# Patient Record
Sex: FEMALE | Race: WHITE | Employment: OTHER | URBAN - METROPOLITAN AREA
[De-identification: names, ages, dates, MRNs, and addresses within clinical notes are randomized per-mention and may not be internally consistent; named-entity substitution may affect disease eponyms.]

---

## 2017-01-06 ENCOUNTER — OFFICE VISIT (OUTPATIENT)
Dept: FAMILY MEDICINE CLINIC | Age: 82
End: 2017-01-06

## 2017-01-06 ENCOUNTER — TELEPHONE (OUTPATIENT)
Dept: FAMILY MEDICINE CLINIC | Age: 82
End: 2017-01-06

## 2017-01-06 VITALS
BODY MASS INDEX: 31.65 KG/M2 | WEIGHT: 190 LBS | HEART RATE: 67 BPM | DIASTOLIC BLOOD PRESSURE: 64 MMHG | OXYGEN SATURATION: 97 % | SYSTOLIC BLOOD PRESSURE: 100 MMHG | HEIGHT: 65 IN

## 2017-01-06 DIAGNOSIS — J30.89 PERENNIAL ALLERGIC RHINITIS, UNSPECIFIED ALLERGIC RHINITIS TRIGGER: ICD-10-CM

## 2017-01-06 DIAGNOSIS — G95.9 CERVICAL MYELOPATHY (HCC): ICD-10-CM

## 2017-01-06 DIAGNOSIS — D64.9 NORMOCYTIC ANEMIA: ICD-10-CM

## 2017-01-06 DIAGNOSIS — M48.02 CERVICAL STENOSIS OF SPINAL CANAL: ICD-10-CM

## 2017-01-06 DIAGNOSIS — E78.5 HYPERLIPIDEMIA, UNSPECIFIED HYPERLIPIDEMIA TYPE: Chronic | ICD-10-CM

## 2017-01-06 DIAGNOSIS — M48.062 LUMBAR STENOSIS WITH NEUROGENIC CLAUDICATION: Primary | ICD-10-CM

## 2017-01-06 DIAGNOSIS — M54.9 CHRONIC BACK PAIN GREATER THAN 3 MONTHS DURATION: ICD-10-CM

## 2017-01-06 DIAGNOSIS — I10 ESSENTIAL HYPERTENSION: Chronic | ICD-10-CM

## 2017-01-06 DIAGNOSIS — I51.7 MILD CONCENTRIC LEFT VENTRICULAR HYPERTROPHY (LVH): ICD-10-CM

## 2017-01-06 DIAGNOSIS — Z79.891 CHRONIC PRESCRIPTION OPIATE USE: ICD-10-CM

## 2017-01-06 DIAGNOSIS — J20.9 ACUTE BRONCHITIS WITH BRONCHOSPASM: ICD-10-CM

## 2017-01-06 DIAGNOSIS — R06.2 WHEEZING: ICD-10-CM

## 2017-01-06 DIAGNOSIS — G89.29 CHRONIC BACK PAIN GREATER THAN 3 MONTHS DURATION: ICD-10-CM

## 2017-01-06 PROCEDURE — 36415 COLL VENOUS BLD VENIPUNCTURE: CPT | Performed by: FAMILY MEDICINE

## 2017-01-06 PROCEDURE — 99203 OFFICE O/P NEW LOW 30 MIN: CPT | Performed by: FAMILY MEDICINE

## 2017-01-06 RX ORDER — OXYCODONE HYDROCHLORIDE AND ACETAMINOPHEN 5; 325 MG/1; MG/1
1 TABLET ORAL EVERY 4 HOURS PRN
COMMUNITY
End: 2018-02-19 | Stop reason: SDUPTHER

## 2017-01-06 RX ORDER — OXYCODONE AND ACETAMINOPHEN 10; 325 MG/1; MG/1
1 TABLET ORAL EVERY 6 HOURS PRN
Qty: 30 TABLET | Refills: 0 | Status: CANCELLED | OUTPATIENT
Start: 2017-01-06

## 2017-01-06 RX ORDER — DEXTROMETHORPHAN HYDROBROMIDE AND PROMETHAZINE HYDROCHLORIDE 15; 6.25 MG/5ML; MG/5ML
5 SYRUP ORAL 4 TIMES DAILY PRN
Qty: 180 ML | Refills: 0 | Status: SHIPPED | OUTPATIENT
Start: 2017-01-06 | End: 2017-01-13

## 2017-01-06 RX ORDER — METHYLPREDNISOLONE 4 MG/1
TABLET ORAL
Qty: 1 KIT | Refills: 0 | Status: SHIPPED | OUTPATIENT
Start: 2017-01-06 | End: 2017-10-05 | Stop reason: ALTCHOICE

## 2017-01-07 LAB
A/G RATIO: 1.6 (ref 1.1–2.2)
ALBUMIN SERPL-MCNC: 4.3 G/DL (ref 3.4–5)
ALP BLD-CCNC: 71 U/L (ref 40–129)
ALT SERPL-CCNC: 26 U/L (ref 10–40)
ANION GAP SERPL CALCULATED.3IONS-SCNC: 11 MMOL/L (ref 3–16)
AST SERPL-CCNC: 24 U/L (ref 15–37)
BASOPHILS ABSOLUTE: 0.1 K/UL (ref 0–0.2)
BASOPHILS RELATIVE PERCENT: 0.8 %
BILIRUB SERPL-MCNC: 0.3 MG/DL (ref 0–1)
BUN BLDV-MCNC: 19 MG/DL (ref 7–20)
CALCIUM SERPL-MCNC: 10.5 MG/DL (ref 8.3–10.6)
CHLORIDE BLD-SCNC: 104 MMOL/L (ref 99–110)
CHOLESTEROL, TOTAL: 148 MG/DL (ref 0–199)
CO2: 32 MMOL/L (ref 21–32)
CREAT SERPL-MCNC: 0.8 MG/DL (ref 0.6–1.2)
EOSINOPHILS ABSOLUTE: 0.2 K/UL (ref 0–0.6)
EOSINOPHILS RELATIVE PERCENT: 2.2 %
FERRITIN: 251.4 NG/ML (ref 15–150)
FOLATE: >20 NG/ML (ref 4.78–24.2)
GFR AFRICAN AMERICAN: >60
GFR NON-AFRICAN AMERICAN: >60
GLOBULIN: 2.7 G/DL
GLUCOSE BLD-MCNC: 125 MG/DL (ref 70–99)
HCT VFR BLD CALC: 35.6 % (ref 36–48)
HDLC SERPL-MCNC: 33 MG/DL (ref 40–60)
HEMOGLOBIN: 12 G/DL (ref 12–16)
IRON SATURATION: 25 % (ref 15–50)
IRON: 75 UG/DL (ref 37–145)
LDL CHOLESTEROL CALCULATED: 66 MG/DL
LYMPHOCYTES ABSOLUTE: 1.8 K/UL (ref 1–5.1)
LYMPHOCYTES RELATIVE PERCENT: 19.4 %
MCH RBC QN AUTO: 30.9 PG (ref 26–34)
MCHC RBC AUTO-ENTMCNC: 33.9 G/DL (ref 31–36)
MCV RBC AUTO: 91.3 FL (ref 80–100)
MONOCYTES ABSOLUTE: 0.4 K/UL (ref 0–1.3)
MONOCYTES RELATIVE PERCENT: 4.7 %
NEUTROPHILS ABSOLUTE: 6.6 K/UL (ref 1.7–7.7)
NEUTROPHILS RELATIVE PERCENT: 72.9 %
PDW BLD-RTO: 13.6 % (ref 12.4–15.4)
PLATELET # BLD: 223 K/UL (ref 135–450)
PMV BLD AUTO: 8.5 FL (ref 5–10.5)
POTASSIUM SERPL-SCNC: 4 MMOL/L (ref 3.5–5.1)
RBC # BLD: 3.9 M/UL (ref 4–5.2)
SODIUM BLD-SCNC: 147 MMOL/L (ref 136–145)
TOTAL IRON BINDING CAPACITY: 296 UG/DL (ref 260–445)
TOTAL PROTEIN: 7 G/DL (ref 6.4–8.2)
TRIGL SERPL-MCNC: 244 MG/DL (ref 0–150)
TSH REFLEX: 1.36 UIU/ML (ref 0.27–4.2)
VITAMIN B-12: 1059 PG/ML (ref 211–911)
VLDLC SERPL CALC-MCNC: 49 MG/DL
WBC # BLD: 9.1 K/UL (ref 4–11)

## 2017-01-08 ASSESSMENT — ENCOUNTER SYMPTOMS
WHEEZING: 1
BACK PAIN: 1
BOWEL INCONTINENCE: 0
COUGH: 1
ABDOMINAL PAIN: 0
SHORTNESS OF BREATH: 0

## 2017-01-13 ENCOUNTER — TELEPHONE (OUTPATIENT)
Dept: FAMILY MEDICINE CLINIC | Age: 82
End: 2017-01-13

## 2017-01-20 PROBLEM — M51.26 DISPLACEMENT OF LUMBAR INTERVERTEBRAL DISC WITHOUT MYELOPATHY: Chronic | Status: ACTIVE | Noted: 2017-01-20

## 2017-01-20 PROBLEM — M47.817 LUMBOSACRAL SPONDYLOSIS WITHOUT MYELOPATHY: Status: ACTIVE | Noted: 2017-01-20

## 2017-01-20 PROBLEM — M48.061 SPINAL STENOSIS, LUMBAR REGION, WITHOUT NEUROGENIC CLAUDICATION: Status: ACTIVE | Noted: 2017-01-20

## 2017-01-20 PROBLEM — M51.379 DEGENERATION OF LUMBAR OR LUMBOSACRAL INTERVERTEBRAL DISC: Chronic | Status: ACTIVE | Noted: 2017-01-20

## 2017-01-20 PROBLEM — M51.37 DEGENERATION OF LUMBAR OR LUMBOSACRAL INTERVERTEBRAL DISC: Chronic | Status: ACTIVE | Noted: 2017-01-20

## 2017-01-20 PROBLEM — M48.061 SPINAL STENOSIS, LUMBAR REGION, WITHOUT NEUROGENIC CLAUDICATION: Chronic | Status: ACTIVE | Noted: 2017-01-20

## 2017-01-20 PROBLEM — M51.37 DEGENERATION OF LUMBAR OR LUMBOSACRAL INTERVERTEBRAL DISC: Status: ACTIVE | Noted: 2017-01-20

## 2017-01-20 PROBLEM — M51.379 DEGENERATION OF LUMBAR OR LUMBOSACRAL INTERVERTEBRAL DISC: Status: ACTIVE | Noted: 2017-01-20

## 2017-01-20 PROBLEM — M47.817 LUMBOSACRAL SPONDYLOSIS WITHOUT MYELOPATHY: Chronic | Status: ACTIVE | Noted: 2017-01-20

## 2017-01-20 PROBLEM — M51.26 DISPLACEMENT OF LUMBAR INTERVERTEBRAL DISC WITHOUT MYELOPATHY: Status: ACTIVE | Noted: 2017-01-20

## 2017-02-27 DIAGNOSIS — G95.9 CERVICAL MYELOPATHY (HCC): ICD-10-CM

## 2017-02-27 DIAGNOSIS — M48.02 CERVICAL STENOSIS OF SPINAL CANAL: ICD-10-CM

## 2017-02-28 RX ORDER — SIMVASTATIN 40 MG
40 TABLET ORAL NIGHTLY
Qty: 30 TABLET | Refills: 5 | Status: SHIPPED | OUTPATIENT
Start: 2017-02-28 | End: 2017-07-19 | Stop reason: SDUPTHER

## 2017-03-17 PROBLEM — M51.26 DISC DISPLACEMENT, LUMBAR: Status: ACTIVE | Noted: 2017-03-17

## 2017-03-17 PROBLEM — Z98.1 S/P LUMBAR FUSION: Status: ACTIVE | Noted: 2017-03-17

## 2017-03-17 PROBLEM — M48.061 LUMBAR STENOSIS: Status: ACTIVE | Noted: 2017-03-17

## 2017-03-17 PROBLEM — M47.816 LUMBAR FACET ARTHROPATHY: Status: ACTIVE | Noted: 2017-03-17

## 2017-03-24 RX ORDER — FLUTICASONE PROPIONATE 50 MCG
2 SPRAY, SUSPENSION (ML) NASAL DAILY
Qty: 1 BOTTLE | Refills: 5 | Status: SHIPPED | OUTPATIENT
Start: 2017-03-24 | End: 2017-04-17 | Stop reason: SDUPTHER

## 2017-04-07 ENCOUNTER — OFFICE VISIT (OUTPATIENT)
Dept: FAMILY MEDICINE CLINIC | Age: 82
End: 2017-04-07

## 2017-04-07 VITALS
BODY MASS INDEX: 32.78 KG/M2 | HEART RATE: 71 BPM | WEIGHT: 197 LBS | DIASTOLIC BLOOD PRESSURE: 68 MMHG | OXYGEN SATURATION: 98 % | SYSTOLIC BLOOD PRESSURE: 124 MMHG

## 2017-04-07 DIAGNOSIS — N39.492 POSTURAL URINARY INCONTINENCE: ICD-10-CM

## 2017-04-07 DIAGNOSIS — N32.81 OAB (OVERACTIVE BLADDER): Primary | ICD-10-CM

## 2017-04-07 PROCEDURE — 99213 OFFICE O/P EST LOW 20 MIN: CPT | Performed by: FAMILY MEDICINE

## 2017-04-07 RX ORDER — TOLTERODINE 4 MG/1
4 CAPSULE, EXTENDED RELEASE ORAL DAILY
Qty: 90 CAPSULE | Refills: 3 | Status: SHIPPED | OUTPATIENT
Start: 2017-04-07 | End: 2018-03-26 | Stop reason: ALTCHOICE

## 2017-04-07 ASSESSMENT — PATIENT HEALTH QUESTIONNAIRE - PHQ9
2. FEELING DOWN, DEPRESSED OR HOPELESS: 0
SUM OF ALL RESPONSES TO PHQ QUESTIONS 1-9: 0
SUM OF ALL RESPONSES TO PHQ9 QUESTIONS 1 & 2: 0
1. LITTLE INTEREST OR PLEASURE IN DOING THINGS: 0

## 2017-04-07 ASSESSMENT — ENCOUNTER SYMPTOMS: BACK PAIN: 1

## 2017-04-18 RX ORDER — FLUTICASONE PROPIONATE 50 MCG
2 SPRAY, SUSPENSION (ML) NASAL DAILY
Qty: 1 BOTTLE | Refills: 5 | Status: SHIPPED | OUTPATIENT
Start: 2017-04-18 | End: 2017-04-19 | Stop reason: SDUPTHER

## 2017-04-19 RX ORDER — FLUTICASONE PROPIONATE 50 MCG
2 SPRAY, SUSPENSION (ML) NASAL DAILY
Qty: 3 BOTTLE | Refills: 3 | Status: SHIPPED | OUTPATIENT
Start: 2017-04-19 | End: 2018-04-30 | Stop reason: SDUPTHER

## 2017-07-19 DIAGNOSIS — M48.02 CERVICAL STENOSIS OF SPINAL CANAL: ICD-10-CM

## 2017-07-19 DIAGNOSIS — G95.9 CERVICAL MYELOPATHY (HCC): ICD-10-CM

## 2017-07-19 RX ORDER — SIMVASTATIN 40 MG
40 TABLET ORAL NIGHTLY
Qty: 90 TABLET | Refills: 2 | Status: SHIPPED | OUTPATIENT
Start: 2017-07-19 | End: 2018-07-30 | Stop reason: SDUPTHER

## 2017-10-05 ENCOUNTER — OFFICE VISIT (OUTPATIENT)
Dept: FAMILY MEDICINE CLINIC | Age: 82
End: 2017-10-05

## 2017-10-05 VITALS
OXYGEN SATURATION: 98 % | BODY MASS INDEX: 33.84 KG/M2 | HEART RATE: 75 BPM | DIASTOLIC BLOOD PRESSURE: 72 MMHG | WEIGHT: 191 LBS | SYSTOLIC BLOOD PRESSURE: 116 MMHG | HEIGHT: 63 IN

## 2017-10-05 DIAGNOSIS — N32.81 OAB (OVERACTIVE BLADDER): ICD-10-CM

## 2017-10-05 DIAGNOSIS — R35.0 URINARY FREQUENCY: Primary | ICD-10-CM

## 2017-10-05 DIAGNOSIS — Z23 FLU VACCINE NEED: ICD-10-CM

## 2017-10-05 PROCEDURE — G0008 ADMIN INFLUENZA VIRUS VAC: HCPCS | Performed by: FAMILY MEDICINE

## 2017-10-05 PROCEDURE — 99213 OFFICE O/P EST LOW 20 MIN: CPT | Performed by: FAMILY MEDICINE

## 2017-10-05 PROCEDURE — 90662 IIV NO PRSV INCREASED AG IM: CPT | Performed by: FAMILY MEDICINE

## 2017-10-06 ENCOUNTER — NURSE ONLY (OUTPATIENT)
Dept: FAMILY MEDICINE CLINIC | Age: 82
End: 2017-10-06

## 2017-10-06 DIAGNOSIS — R35.0 URINARY FREQUENCY: ICD-10-CM

## 2017-10-06 LAB
BILIRUBIN, POC: ABNORMAL
BLOOD URINE, POC: ABNORMAL
CLARITY, POC: ABNORMAL
COLOR, POC: YELLOW
GLUCOSE URINE, POC: ABNORMAL
KETONES, POC: ABNORMAL
LEUKOCYTE EST, POC: ABNORMAL
NITRITE, POC: ABNORMAL
PH, POC: 5.5
PROTEIN, POC: ABNORMAL
SPECIFIC GRAVITY, POC: 1.03
UROBILINOGEN, POC: 0.2

## 2017-10-06 PROCEDURE — 81002 URINALYSIS NONAUTO W/O SCOPE: CPT | Performed by: FAMILY MEDICINE

## 2017-10-06 NOTE — PROGRESS NOTES
urine studies are back,     Flu vaccine need  -     INFLUENZA, HIGH DOSE, 65 YRS +, IM, PF, PREFILL SYR, 0.5ML (FLUZONE HD)

## 2017-10-09 LAB
ORGANISM: ABNORMAL
URINE CULTURE, ROUTINE: ABNORMAL
URINE CULTURE, ROUTINE: ABNORMAL

## 2017-10-11 RX ORDER — NITROFURANTOIN 25; 75 MG/1; MG/1
100 CAPSULE ORAL 2 TIMES DAILY
Qty: 20 CAPSULE | Refills: 0 | Status: SHIPPED | OUTPATIENT
Start: 2017-10-11 | End: 2017-10-21

## 2017-12-13 ENCOUNTER — TELEPHONE (OUTPATIENT)
Dept: FAMILY MEDICINE CLINIC | Age: 82
End: 2017-12-13

## 2017-12-13 NOTE — TELEPHONE ENCOUNTER
Pt. Will be having local and maybe twilight sedation for a surgical procedure on 12-. She was instructed by the surgeons office ( Dr. Calderon Dempsey) to check with you regarding the usage of her medications before and after the surgery. Is there something she should not take or take differently. She explained the procedure as something like the MOHS surgery for a lesion on her face.

## 2018-01-23 ENCOUNTER — OFFICE VISIT (OUTPATIENT)
Dept: FAMILY MEDICINE CLINIC | Age: 83
End: 2018-01-23

## 2018-01-23 VITALS
DIASTOLIC BLOOD PRESSURE: 78 MMHG | BODY MASS INDEX: 34.2 KG/M2 | WEIGHT: 193 LBS | SYSTOLIC BLOOD PRESSURE: 128 MMHG | OXYGEN SATURATION: 98 % | HEIGHT: 63 IN | HEART RATE: 70 BPM

## 2018-01-23 DIAGNOSIS — Z00.00 ROUTINE GENERAL MEDICAL EXAMINATION AT A HEALTH CARE FACILITY: Primary | ICD-10-CM

## 2018-01-23 DIAGNOSIS — G95.9 CERVICAL MYELOPATHY (HCC): ICD-10-CM

## 2018-01-23 DIAGNOSIS — B37.2 INTERTRIGINOUS CANDIDIASIS: ICD-10-CM

## 2018-01-23 DIAGNOSIS — R60.0 BILATERAL LEG EDEMA: ICD-10-CM

## 2018-01-23 DIAGNOSIS — I10 ESSENTIAL HYPERTENSION: Chronic | ICD-10-CM

## 2018-01-23 DIAGNOSIS — E78.5 HYPERLIPIDEMIA, UNSPECIFIED HYPERLIPIDEMIA TYPE: Chronic | ICD-10-CM

## 2018-01-23 DIAGNOSIS — N32.81 OAB (OVERACTIVE BLADDER): ICD-10-CM

## 2018-01-23 LAB
A/G RATIO: 1.5 (ref 1.1–2.2)
ALBUMIN SERPL-MCNC: 4.3 G/DL (ref 3.4–5)
ALP BLD-CCNC: 70 U/L (ref 40–129)
ALT SERPL-CCNC: 22 U/L (ref 10–40)
ANION GAP SERPL CALCULATED.3IONS-SCNC: 12 MMOL/L (ref 3–16)
AST SERPL-CCNC: 21 U/L (ref 15–37)
BILIRUB SERPL-MCNC: 0.3 MG/DL (ref 0–1)
BUN BLDV-MCNC: 24 MG/DL (ref 7–20)
CALCIUM SERPL-MCNC: 10.8 MG/DL (ref 8.3–10.6)
CHLORIDE BLD-SCNC: 104 MMOL/L (ref 99–110)
CHOLESTEROL, TOTAL: 147 MG/DL (ref 0–199)
CO2: 28 MMOL/L (ref 21–32)
CREAT SERPL-MCNC: 0.7 MG/DL (ref 0.6–1.2)
GFR AFRICAN AMERICAN: >60
GFR NON-AFRICAN AMERICAN: >60
GLOBULIN: 2.8 G/DL
GLUCOSE BLD-MCNC: 116 MG/DL (ref 70–99)
HDLC SERPL-MCNC: 38 MG/DL (ref 40–60)
LDL CHOLESTEROL CALCULATED: 71 MG/DL
POTASSIUM SERPL-SCNC: 4.7 MMOL/L (ref 3.5–5.1)
SODIUM BLD-SCNC: 144 MMOL/L (ref 136–145)
TOTAL PROTEIN: 7.1 G/DL (ref 6.4–8.2)
TRIGL SERPL-MCNC: 190 MG/DL (ref 0–150)
TSH REFLEX: 1.82 UIU/ML (ref 0.27–4.2)
VLDLC SERPL CALC-MCNC: 38 MG/DL

## 2018-01-23 PROCEDURE — G0438 PPPS, INITIAL VISIT: HCPCS | Performed by: FAMILY MEDICINE

## 2018-01-23 PROCEDURE — 1123F ACP DISCUSS/DSCN MKR DOCD: CPT | Performed by: FAMILY MEDICINE

## 2018-01-23 PROCEDURE — 1036F TOBACCO NON-USER: CPT | Performed by: FAMILY MEDICINE

## 2018-01-23 PROCEDURE — G8427 DOCREV CUR MEDS BY ELIG CLIN: HCPCS | Performed by: FAMILY MEDICINE

## 2018-01-23 PROCEDURE — 4040F PNEUMOC VAC/ADMIN/RCVD: CPT | Performed by: FAMILY MEDICINE

## 2018-01-23 PROCEDURE — 1090F PRES/ABSN URINE INCON ASSESS: CPT | Performed by: FAMILY MEDICINE

## 2018-01-23 PROCEDURE — G8400 PT W/DXA NO RESULTS DOC: HCPCS | Performed by: FAMILY MEDICINE

## 2018-01-23 PROCEDURE — G8484 FLU IMMUNIZE NO ADMIN: HCPCS | Performed by: FAMILY MEDICINE

## 2018-01-23 PROCEDURE — G8417 CALC BMI ABV UP PARAM F/U: HCPCS | Performed by: FAMILY MEDICINE

## 2018-01-23 PROCEDURE — 99214 OFFICE O/P EST MOD 30 MIN: CPT | Performed by: FAMILY MEDICINE

## 2018-01-23 RX ORDER — NYSTATIN 100000 [USP'U]/G
POWDER TOPICAL
Qty: 45 G | Refills: 1 | Status: SHIPPED | OUTPATIENT
Start: 2018-01-23 | End: 2020-01-07 | Stop reason: CLARIF

## 2018-01-23 ASSESSMENT — ANXIETY QUESTIONNAIRES: GAD7 TOTAL SCORE: 0

## 2018-01-23 ASSESSMENT — PATIENT HEALTH QUESTIONNAIRE - PHQ9: SUM OF ALL RESPONSES TO PHQ QUESTIONS 1-9: 0

## 2018-01-23 ASSESSMENT — LIFESTYLE VARIABLES: HOW OFTEN DO YOU HAVE A DRINK CONTAINING ALCOHOL: 0

## 2018-01-24 ASSESSMENT — ENCOUNTER SYMPTOMS
ABDOMINAL PAIN: 0
SHORTNESS OF BREATH: 0

## 2018-01-24 NOTE — PROGRESS NOTES
Subjective:      Patient ID: Lucian Martel is a 80 y.o. female. Chronic Neck Pain:  Pain is unchanged. On average, pain is perceived as severe (6-8 pain scale). Change in quality of symptoms: no.  Associated symptoms: weakness- arms. She denies: any other symptoms. Current treatment: currently doing PT, taking pain meds from pain management. Medication side effects: none. Recent diagnostic testing: none. Treatment Adherence:   Medication compliance:  compliant most of the time  Diet compliance:  noncompliant: eating out, had [employer] last night (steak hoagie and Turkish onion soup)  Weight trend: stable  Current exercise: 3 times per week at PT  Barriers: impairment:  Chronic pain    Hypertension:  Home blood pressure monitoring: No.  She is not adherent to a low sodium diet. Patient denies chest pain, shortness of breath and peripheral edema. Antihypertensive medication side effects: no medication side effects noted. Sodium (mmol/L)       Date                     Value                 01/23/2018               144              ---------- BUN (mg/dL)       Date                     Value                 01/23/2018               24 (H)           ---------- Glucose (mg/dL)       Date                     Value                 01/23/2018               116 (H)          ----------  Potassium (mmol/L)       Date                     Value                 01/23/2018               4.7              ---------- CREATININE (mg/dL)       Date                     Value                 01/23/2018               0.7              ----------     Hyperlipidemia:  No new myalgias or GI upset on simvastatin (Zocor).      Lab Results       Component                Value               Date                       TRIG                     190                 01/23/2018                 HDL                      38                  01/23/2018                 LDLCALC                  71

## 2018-01-25 ENCOUNTER — TELEPHONE (OUTPATIENT)
Dept: FAMILY MEDICINE CLINIC | Age: 83
End: 2018-01-25

## 2018-03-05 ENCOUNTER — TELEPHONE (OUTPATIENT)
Dept: FAMILY MEDICINE CLINIC | Age: 83
End: 2018-03-05

## 2018-03-05 NOTE — TELEPHONE ENCOUNTER
Dr. Edgard Mina planned to continue the Detrol (tolterodine) but has asked her to consider pelvic floor therapy. If she is agreeable this can be ordered. This is located at the [facility].

## 2018-03-06 RX ORDER — DARIFENACIN HYDROBROMIDE 15 MG/1
15 TABLET, EXTENDED RELEASE ORAL DAILY
Qty: 30 TABLET | Refills: 3 | Status: SHIPPED | OUTPATIENT
Start: 2018-03-06 | End: 2018-04-30 | Stop reason: SDUPTHER

## 2018-03-23 ENCOUNTER — TELEPHONE (OUTPATIENT)
Dept: FAMILY MEDICINE CLINIC | Age: 83
End: 2018-03-23

## 2018-03-23 DIAGNOSIS — R05.9 COUGH: Primary | ICD-10-CM

## 2018-03-23 RX ORDER — BENZONATATE 100 MG/1
100 CAPSULE ORAL 3 TIMES DAILY PRN
Qty: 30 CAPSULE | Refills: 0 | Status: SHIPPED | OUTPATIENT
Start: 2018-03-23 | End: 2018-03-26 | Stop reason: ALTCHOICE

## 2018-03-26 ENCOUNTER — OFFICE VISIT (OUTPATIENT)
Dept: FAMILY MEDICINE CLINIC | Age: 83
End: 2018-03-26

## 2018-03-26 VITALS
HEART RATE: 62 BPM | OXYGEN SATURATION: 97 % | DIASTOLIC BLOOD PRESSURE: 52 MMHG | SYSTOLIC BLOOD PRESSURE: 120 MMHG | BODY MASS INDEX: 34.19 KG/M2 | WEIGHT: 193 LBS

## 2018-03-26 DIAGNOSIS — J20.9 ACUTE BRONCHITIS, UNSPECIFIED ORGANISM: Primary | ICD-10-CM

## 2018-03-26 DIAGNOSIS — I10 ESSENTIAL HYPERTENSION: Chronic | ICD-10-CM

## 2018-03-26 PROCEDURE — 1123F ACP DISCUSS/DSCN MKR DOCD: CPT | Performed by: FAMILY MEDICINE

## 2018-03-26 PROCEDURE — G8400 PT W/DXA NO RESULTS DOC: HCPCS | Performed by: FAMILY MEDICINE

## 2018-03-26 PROCEDURE — 1036F TOBACCO NON-USER: CPT | Performed by: FAMILY MEDICINE

## 2018-03-26 PROCEDURE — 99213 OFFICE O/P EST LOW 20 MIN: CPT | Performed by: FAMILY MEDICINE

## 2018-03-26 PROCEDURE — G8417 CALC BMI ABV UP PARAM F/U: HCPCS | Performed by: FAMILY MEDICINE

## 2018-03-26 PROCEDURE — 1090F PRES/ABSN URINE INCON ASSESS: CPT | Performed by: FAMILY MEDICINE

## 2018-03-26 PROCEDURE — G8427 DOCREV CUR MEDS BY ELIG CLIN: HCPCS | Performed by: FAMILY MEDICINE

## 2018-03-26 PROCEDURE — G8482 FLU IMMUNIZE ORDER/ADMIN: HCPCS | Performed by: FAMILY MEDICINE

## 2018-03-26 PROCEDURE — 4040F PNEUMOC VAC/ADMIN/RCVD: CPT | Performed by: FAMILY MEDICINE

## 2018-03-26 RX ORDER — ALBUTEROL SULFATE 90 UG/1
2 AEROSOL, METERED RESPIRATORY (INHALATION) EVERY 6 HOURS PRN
Qty: 1 INHALER | Refills: 3 | Status: SHIPPED | OUTPATIENT
Start: 2018-03-26 | End: 2018-12-28 | Stop reason: ALTCHOICE

## 2018-03-26 RX ORDER — AZITHROMYCIN 250 MG/1
TABLET, FILM COATED ORAL
Qty: 1 PACKET | Refills: 0 | Status: SHIPPED | OUTPATIENT
Start: 2018-03-26 | End: 2018-04-10 | Stop reason: ALTCHOICE

## 2018-03-26 RX ORDER — BENZONATATE 100 MG/1
100 CAPSULE ORAL 3 TIMES DAILY PRN
COMMUNITY
End: 2018-04-10 | Stop reason: ALTCHOICE

## 2018-03-26 RX ORDER — BLOOD PRESSURE TEST KIT
KIT MISCELLANEOUS
Qty: 1 KIT | Refills: 0 | Status: SHIPPED | OUTPATIENT
Start: 2018-03-26

## 2018-03-26 ASSESSMENT — ENCOUNTER SYMPTOMS
COUGH: 1
WHEEZING: 0

## 2018-04-10 ENCOUNTER — OFFICE VISIT (OUTPATIENT)
Dept: FAMILY MEDICINE CLINIC | Age: 83
End: 2018-04-10

## 2018-04-10 VITALS
SYSTOLIC BLOOD PRESSURE: 124 MMHG | BODY MASS INDEX: 34.55 KG/M2 | HEART RATE: 61 BPM | DIASTOLIC BLOOD PRESSURE: 58 MMHG | WEIGHT: 195 LBS | HEIGHT: 63 IN | OXYGEN SATURATION: 96 %

## 2018-04-10 DIAGNOSIS — J20.9 ACUTE BRONCHITIS WITH BRONCHOSPASM: ICD-10-CM

## 2018-04-10 DIAGNOSIS — Z23 NEED FOR PROPHYLACTIC VACCINATION AND INOCULATION AGAINST VARICELLA: ICD-10-CM

## 2018-04-10 DIAGNOSIS — I10 ESSENTIAL HYPERTENSION: Primary | Chronic | ICD-10-CM

## 2018-04-10 PROCEDURE — 99214 OFFICE O/P EST MOD 30 MIN: CPT | Performed by: FAMILY MEDICINE

## 2018-04-10 PROCEDURE — 1036F TOBACCO NON-USER: CPT | Performed by: FAMILY MEDICINE

## 2018-04-10 PROCEDURE — 1123F ACP DISCUSS/DSCN MKR DOCD: CPT | Performed by: FAMILY MEDICINE

## 2018-04-10 PROCEDURE — G8427 DOCREV CUR MEDS BY ELIG CLIN: HCPCS | Performed by: FAMILY MEDICINE

## 2018-04-10 PROCEDURE — 1090F PRES/ABSN URINE INCON ASSESS: CPT | Performed by: FAMILY MEDICINE

## 2018-04-10 PROCEDURE — G8400 PT W/DXA NO RESULTS DOC: HCPCS | Performed by: FAMILY MEDICINE

## 2018-04-10 PROCEDURE — 4040F PNEUMOC VAC/ADMIN/RCVD: CPT | Performed by: FAMILY MEDICINE

## 2018-04-10 PROCEDURE — G8417 CALC BMI ABV UP PARAM F/U: HCPCS | Performed by: FAMILY MEDICINE

## 2018-04-10 RX ORDER — METHYLPREDNISOLONE 4 MG/1
TABLET ORAL
Qty: 1 KIT | Refills: 0 | Status: SHIPPED | OUTPATIENT
Start: 2018-04-10 | End: 2018-10-08 | Stop reason: ALTCHOICE

## 2018-04-10 RX ORDER — METHYLPREDNISOLONE 4 MG/1
TABLET ORAL
Qty: 1 KIT | Refills: 0 | Status: SHIPPED | OUTPATIENT
Start: 2018-04-10 | End: 2018-04-10 | Stop reason: SDUPTHER

## 2018-04-12 ASSESSMENT — ENCOUNTER SYMPTOMS
SPUTUM PRODUCTION: 0
WHEEZING: 1
SHORTNESS OF BREATH: 0
COUGH: 1

## 2018-04-30 RX ORDER — FLUTICASONE PROPIONATE 50 MCG
2 SPRAY, SUSPENSION (ML) NASAL DAILY
Qty: 3 BOTTLE | Refills: 3 | Status: SHIPPED | OUTPATIENT
Start: 2018-04-30 | End: 2019-04-26 | Stop reason: SDUPTHER

## 2018-04-30 RX ORDER — DARIFENACIN HYDROBROMIDE 15 MG/1
15 TABLET, EXTENDED RELEASE ORAL DAILY
Qty: 90 TABLET | Refills: 3 | Status: SHIPPED | OUTPATIENT
Start: 2018-04-30 | End: 2018-10-08 | Stop reason: ALTCHOICE

## 2018-07-30 DIAGNOSIS — G95.9 CERVICAL MYELOPATHY (HCC): ICD-10-CM

## 2018-07-30 DIAGNOSIS — M48.02 CERVICAL STENOSIS OF SPINAL CANAL: ICD-10-CM

## 2018-08-01 RX ORDER — SIMVASTATIN 40 MG
40 TABLET ORAL NIGHTLY
Qty: 90 TABLET | Refills: 2 | Status: SHIPPED | OUTPATIENT
Start: 2018-08-01 | End: 2019-05-03 | Stop reason: SDUPTHER

## 2018-09-21 ENCOUNTER — APPOINTMENT (OUTPATIENT)
Dept: CT IMAGING | Age: 83
End: 2018-09-21
Payer: MEDICARE

## 2018-09-21 ENCOUNTER — TELEPHONE (OUTPATIENT)
Dept: FAMILY MEDICINE CLINIC | Age: 83
End: 2018-09-21

## 2018-09-21 ENCOUNTER — HOSPITAL ENCOUNTER (EMERGENCY)
Age: 83
Discharge: NURSING, REHAB OR LONG-TERM CARE | End: 2018-09-21
Payer: MEDICARE

## 2018-09-21 VITALS
RESPIRATION RATE: 16 BRPM | DIASTOLIC BLOOD PRESSURE: 58 MMHG | OXYGEN SATURATION: 100 % | WEIGHT: 200 LBS | TEMPERATURE: 98.2 F | BODY MASS INDEX: 39.27 KG/M2 | HEART RATE: 70 BPM | SYSTOLIC BLOOD PRESSURE: 137 MMHG | HEIGHT: 60 IN

## 2018-09-21 DIAGNOSIS — R31.9 URINARY TRACT INFECTION WITH HEMATURIA, SITE UNSPECIFIED: ICD-10-CM

## 2018-09-21 DIAGNOSIS — N39.0 URINARY TRACT INFECTION WITH HEMATURIA, SITE UNSPECIFIED: ICD-10-CM

## 2018-09-21 DIAGNOSIS — G89.29 ACUTE EXACERBATION OF CHRONIC LOW BACK PAIN: ICD-10-CM

## 2018-09-21 DIAGNOSIS — M54.50 ACUTE EXACERBATION OF CHRONIC LOW BACK PAIN: ICD-10-CM

## 2018-09-21 DIAGNOSIS — M54.6 ACUTE RIGHT-SIDED THORACIC BACK PAIN: ICD-10-CM
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
## 2018-09-21 LAB
A/G RATIO: 1.4 (ref 1.1–2.2)
ALBUMIN SERPL-MCNC: 4.2 G/DL (ref 3.4–5)
ALP BLD-CCNC: 68 U/L (ref 40–129)
ALT SERPL-CCNC: 25 U/L (ref 10–40)
AMORPHOUS: ABNORMAL /HPF
ANION GAP SERPL CALCULATED.3IONS-SCNC: 12 MMOL/L (ref 3–16)
AST SERPL-CCNC: 24 U/L (ref 15–37)
BACTERIA: ABNORMAL /HPF
BASOPHILS ABSOLUTE: 0.1 K/UL (ref 0–0.2)
BASOPHILS RELATIVE PERCENT: 0.6 %
BILIRUB SERPL-MCNC: 0.5 MG/DL (ref 0–1)
BILIRUBIN URINE: NEGATIVE
BLOOD, URINE: NEGATIVE
BUN BLDV-MCNC: 26 MG/DL (ref 7–20)
CALCIUM SERPL-MCNC: 10.3 MG/DL (ref 8.3–10.6)
CHLORIDE BLD-SCNC: 105 MMOL/L (ref 99–110)
CLARITY: ABNORMAL
CO2: 26 MMOL/L (ref 21–32)
COLOR: YELLOW
CREAT SERPL-MCNC: 0.9 MG/DL (ref 0.6–1.2)
EOSINOPHILS ABSOLUTE: 0.1 K/UL (ref 0–0.6)
EOSINOPHILS RELATIVE PERCENT: 0.7 %
EPITHELIAL CELLS, UA: ABNORMAL /HPF
GFR AFRICAN AMERICAN: >60
GFR NON-AFRICAN AMERICAN: 60
GLOBULIN: 3.1 G/DL
GLUCOSE BLD-MCNC: 127 MG/DL (ref 70–99)
GLUCOSE URINE: NEGATIVE MG/DL
HCT VFR BLD CALC: 34.3 % (ref 36–48)
HEMOGLOBIN: 12.1 G/DL (ref 12–16)
KETONES, URINE: NEGATIVE MG/DL
LEUKOCYTE ESTERASE, URINE: ABNORMAL
LYMPHOCYTES ABSOLUTE: 1.3 K/UL (ref 1–5.1)
LYMPHOCYTES RELATIVE PERCENT: 12.8 %
MCH RBC QN AUTO: 31.9 PG (ref 26–34)
MCHC RBC AUTO-ENTMCNC: 35.3 G/DL (ref 31–36)
MCV RBC AUTO: 90.2 FL (ref 80–100)
MICROSCOPIC EXAMINATION: YES
MONOCYTES ABSOLUTE: 0.4 K/UL (ref 0–1.3)
MONOCYTES RELATIVE PERCENT: 4.4 %
NEUTROPHILS ABSOLUTE: 8.2 K/UL (ref 1.7–7.7)
NEUTROPHILS RELATIVE PERCENT: 81.5 %
NITRITE, URINE: NEGATIVE
PDW BLD-RTO: 13.8 % (ref 12.4–15.4)
PH UA: 5.5
PLATELET # BLD: 197 K/UL (ref 135–450)
PMV BLD AUTO: 8.3 FL (ref 5–10.5)
POTASSIUM SERPL-SCNC: 4.6 MMOL/L (ref 3.5–5.1)
PROTEIN UA: NEGATIVE MG/DL
RBC # BLD: 3.8 M/UL (ref 4–5.2)
RBC UA: ABNORMAL /HPF (ref 0–2)
SODIUM BLD-SCNC: 143 MMOL/L (ref 136–145)
SPECIFIC GRAVITY UA: 1.02
TOTAL PROTEIN: 7.3 G/DL (ref 6.4–8.2)
URINE TYPE: ABNORMAL
UROBILINOGEN, URINE: 0.2 E.U./DL
WBC # BLD: 10 K/UL (ref 4–11)
WBC UA: ABNORMAL /HPF (ref 0–5)

## 2018-09-21 PROCEDURE — G8988 SELF CARE GOAL STATUS: HCPCS

## 2018-09-21 PROCEDURE — G8987 SELF CARE CURRENT STATUS: HCPCS

## 2018-09-21 PROCEDURE — 2580000003 HC RX 258: Performed by: PHYSICIAN ASSISTANT

## 2018-09-21 PROCEDURE — 99284 EMERGENCY DEPT VISIT MOD MDM: CPT

## 2018-09-21 PROCEDURE — 96361 HYDRATE IV INFUSION ADD-ON: CPT

## 2018-09-21 PROCEDURE — 85025 COMPLETE CBC W/AUTO DIFF WBC: CPT

## 2018-09-21 PROCEDURE — 87186 SC STD MICRODIL/AGAR DIL: CPT

## 2018-09-21 PROCEDURE — 97161 PT EVAL LOW COMPLEX 20 MIN: CPT

## 2018-09-21 PROCEDURE — 97166 OT EVAL MOD COMPLEX 45 MIN: CPT

## 2018-09-21 PROCEDURE — 87077 CULTURE AEROBIC IDENTIFY: CPT

## 2018-09-21 PROCEDURE — 81001 URINALYSIS AUTO W/SCOPE: CPT

## 2018-09-21 PROCEDURE — 96372 THER/PROPH/DIAG INJ SC/IM: CPT

## 2018-09-21 PROCEDURE — 87086 URINE CULTURE/COLONY COUNT: CPT

## 2018-09-21 PROCEDURE — G8978 MOBILITY CURRENT STATUS: HCPCS

## 2018-09-21 PROCEDURE — 71250 CT THORAX DX C-: CPT

## 2018-09-21 PROCEDURE — 80053 COMPREHEN METABOLIC PANEL: CPT

## 2018-09-21 PROCEDURE — 6360000002 HC RX W HCPCS: Performed by: PHYSICIAN ASSISTANT

## 2018-09-21 PROCEDURE — 6370000000 HC RX 637 (ALT 250 FOR IP): Performed by: PHYSICIAN ASSISTANT

## 2018-09-21 PROCEDURE — 72131 CT LUMBAR SPINE W/O DYE: CPT

## 2018-09-21 PROCEDURE — G8979 MOBILITY GOAL STATUS: HCPCS

## 2018-09-21 PROCEDURE — 96374 THER/PROPH/DIAG INJ IV PUSH: CPT

## 2018-09-21 RX ORDER — KETOROLAC TROMETHAMINE 30 MG/ML
30 INJECTION, SOLUTION INTRAMUSCULAR; INTRAVENOUS ONCE
Status: COMPLETED | OUTPATIENT
Start: 2018-09-21 | End: 2018-09-21

## 2018-09-21 RX ORDER — OXYCODONE HYDROCHLORIDE AND ACETAMINOPHEN 5; 325 MG/1; MG/1
2 TABLET ORAL ONCE
Status: COMPLETED | OUTPATIENT
Start: 2018-09-21 | End: 2018-09-21

## 2018-09-21 RX ORDER — OXYCODONE HYDROCHLORIDE AND ACETAMINOPHEN 5; 325 MG/1; MG/1
1 TABLET ORAL EVERY 4 HOURS PRN
Qty: 20 TABLET | Refills: 0 | Status: SHIPPED | OUTPATIENT
Start: 2018-09-21 | End: 2018-11-08 | Stop reason: SDUPTHER

## 2018-09-21 RX ORDER — MORPHINE SULFATE 2 MG/ML
4 INJECTION, SOLUTION INTRAMUSCULAR; INTRAVENOUS ONCE
Status: COMPLETED | OUTPATIENT
Start: 2018-09-21 | End: 2018-09-21

## 2018-09-21 RX ORDER — METHOCARBAMOL 500 MG/1
500 TABLET, FILM COATED ORAL 3 TIMES DAILY
Qty: 15 TABLET | Refills: 0 | Status: SHIPPED | OUTPATIENT
Start: 2018-09-21 | End: 2018-10-08 | Stop reason: SDUPTHER

## 2018-09-21 RX ORDER — ONDANSETRON 4 MG/1
4 TABLET, ORALLY DISINTEGRATING ORAL ONCE
Status: COMPLETED | OUTPATIENT
Start: 2018-09-21 | End: 2018-09-21

## 2018-09-21 RX ORDER — METHOCARBAMOL 750 MG/1
750 TABLET, FILM COATED ORAL ONCE
Status: COMPLETED | OUTPATIENT
Start: 2018-09-21 | End: 2018-09-21

## 2018-09-21 RX ORDER — SODIUM CHLORIDE 9 MG/ML
1000 INJECTION, SOLUTION INTRAVENOUS CONTINUOUS
Status: DISCONTINUED | OUTPATIENT
Start: 2018-09-21 | End: 2018-09-21 | Stop reason: HOSPADM

## 2018-09-21 RX ORDER — CEPHALEXIN 250 MG/1
500 CAPSULE ORAL ONCE
Status: COMPLETED | OUTPATIENT
Start: 2018-09-21 | End: 2018-09-21

## 2018-09-21 RX ORDER — DIAZEPAM 5 MG/1
5 TABLET ORAL ONCE
Status: COMPLETED | OUTPATIENT
Start: 2018-09-21 | End: 2018-09-21

## 2018-09-21 RX ORDER — CEPHALEXIN 500 MG/1
500 CAPSULE ORAL 2 TIMES DAILY
Qty: 20 CAPSULE | Refills: 0 | Status: SHIPPED | OUTPATIENT
Start: 2018-09-21 | End: 2018-10-01

## 2018-09-21 RX ADMIN — OXYCODONE HYDROCHLORIDE AND ACETAMINOPHEN 2 TABLET: 5; 325 TABLET ORAL at 08:59

## 2018-09-21 RX ADMIN — KETOROLAC TROMETHAMINE 30 MG: 30 INJECTION, SOLUTION INTRAMUSCULAR at 12:00

## 2018-09-21 RX ADMIN — METHOCARBAMOL 750 MG: 750 TABLET ORAL at 14:59

## 2018-09-21 RX ADMIN — ONDANSETRON 4 MG: 4 TABLET, ORALLY DISINTEGRATING ORAL at 08:54

## 2018-09-21 RX ADMIN — CEPHALEXIN 500 MG: 250 CAPSULE ORAL at 12:00

## 2018-09-21 RX ADMIN — MORPHINE SULFATE 4 MG: 2 INJECTION, SOLUTION INTRAMUSCULAR; INTRAVENOUS at 12:00

## 2018-09-21 RX ADMIN — HYDROMORPHONE HYDROCHLORIDE 1 MG: 1 INJECTION, SOLUTION INTRAMUSCULAR; INTRAVENOUS; SUBCUTANEOUS at 14:59

## 2018-09-21 RX ADMIN — SODIUM CHLORIDE 1000 ML: 9 INJECTION, SOLUTION INTRAVENOUS at 14:59

## 2018-09-21 RX ADMIN — DIAZEPAM 5 MG: 5 TABLET ORAL at 08:59

## 2018-09-21 ASSESSMENT — PAIN SCALES - GENERAL
PAINLEVEL_OUTOF10: 10
PAINLEVEL_OUTOF10: 8
PAINLEVEL_OUTOF10: 8
PAINLEVEL_OUTOF10: 5
PAINLEVEL_OUTOF10: 8
PAINLEVEL_OUTOF10: 6
PAINLEVEL_OUTOF10: 8
PAINLEVEL_OUTOF10: 5
PAINLEVEL_OUTOF10: 10
PAINLEVEL_OUTOF10: 5
PAINLEVEL_OUTOF10: 8

## 2018-09-21 ASSESSMENT — PAIN DESCRIPTION - ORIENTATION
ORIENTATION: RIGHT

## 2018-09-21 ASSESSMENT — PAIN DESCRIPTION - LOCATION
LOCATION: BACK

## 2018-09-21 NOTE — CARE COORDINATION
CASE MANAGEMENT DISCHARGE SUMMARY      Discharge to: [facility] to [address] completed: no cert [facility] Exemption Notification (HENS) completed: PASSR completed    New Durable Medical Equipment ordered/agency: n/a    Transportation:    Family/car:    Medical Transport/ time: [facility] stretcher. Eta 1600   Ambulance form completed: Yes    Notified:    Family: daughter    Facility/Agency: [facility] faxed   RN: Parviz Sommers   Phone number for report to facility: [phone]    Note: Discharging nurse to complete [facility], reconcile AVS, and place final copy with patient's discharge packet. RN to ensure that written prescriptions for Level II medications are sent with patient to the facility as per protocol. [facility]S/[facility] faxed to [facility]. VM left for Carolee Weber in admissions to notify orders were faxed and transport eta is 1600    3:22 PM  CM verified that Carolee Weber at facility received all paperwork.     Jess Smith RN DCP

## 2018-09-21 NOTE — ED NOTES
Pt states she is unable to get up to obtain urine specimen. Agreeable to straight cath.       Nasrin Fulton RN  09/21/18 5710

## 2018-09-21 NOTE — CARE COORDINATION
CM spoke with Manish Rick in admissions at [facility]. She said the CAN accept patient is she qualified under the 6-click program. SHERIE spoke to Xuan Muller with PT team, who states pt should qualify. Face sheet faxed for [facility] to review pt information. Pending PT entering their note. Primary nurse, Lida Piper, updated. CM following.      Laure Patel RN DCP

## 2018-09-21 NOTE — PROGRESS NOTES
Physical Therapy    Facility/Department: [address] [place] [address]  ED  Initial Assessment    NAME: Jaiden Ray  : 1934  MRN: [id number]    Date of Service: 2018    Discharge Recommendations:  Subacute/Skilled Nursing Facility   PT Equipment Recommendations  Equipment Needed: No    Patient Diagnosis(es): There were no encounter diagnoses. has a past medical history of Allergic rhinitis; Arthritis; Bladder dysfunction; Blood transfusion; Cancer ([facility] [address] Cervical stenosis of spine; Chronic constipation; Constipation, other cause; GERD (gastroesophageal reflux disease); Hyperlipidemia; Hypertension; Incontinence of urine; Lumbar stenosis with neurogenic claudication; SCC (squamous cell carcinoma); Sinus congestion; Skin abnormality; Sleep apnea; and Wears dentures. has a past surgical history that includes Cholecystectomy; skin biopsy; Colonoscopy; Endoscopy, colon, diagnostic; Hysterectomy; Carpal tunnel release; lumbar laminectomy; Tonsillectomy; Appendectomy; Foot surgery; Cervical discectomy (2/15/12); back surgery; back surgery ([id number]); joint replacement; other surgical history (2012); laminectomy (12); Spinal fusion (2/15/12); Spinal fusion (12); other surgical history (); and other surgical history (N/A, 2014). Restrictions  Restrictions/Precautions  Restrictions/Precautions: Fall Risk, General Precautions     Vision/Hearing  Vision: Impaired  Vision Exceptions: Wears glasses for reading  Hearing: Within functional limits       Subjective  General  Chart Reviewed: Yes  Patient assessed for rehabilitation services?: Yes  Response To Previous Treatment: Not applicable  Family / Caregiver Present: Yes (Two daughters)  Referring Practitioner: LILY Donnelly  Referral Date : 18  Diagnosis: Acute exacerbation of chronic LBP s/p fall  Follows Commands: Within Functional Limits  General Comment  Comments: PT eval and treatment cleared by RN.  Pt presents resting on stretcher, provided verbal consent to PT eval and treatment  Pain Screening  Patient Currently in Pain: Yes  Pain Assessment  Pain Level: 8  Pain Location: Back  Pain Orientation: Right     Orientation  Orientation  Overall Orientation Status: Within Normal Limits    Social/Functional History  Social/Functional History  Lives With: Daughter  Type of Home: House  Home Layout: One level  Home Access: Level entry, Ramped entrance  Bathroom Shower/Tub: Tub/Shower unit  Bathroom Toilet: Handicap height  Bathroom Equipment: Tub transfer bench, Grab bars in shower, Hand-held shower, Grab bars around toilet  Home Equipment: 4 wheeled walker, Wheelchair-electric, Wheelchair-manual, Lift chair, Reacher (bed cane )  ADL Assistance: Independent  Homemaking Assistance: Needs assistance (Daughter performs housework )  Ambulation Assistance: Independent (short distances with 0CB)  Transfer Assistance: Independent  Active : No  Mode of Transportation: Family  Leisure & Hobbies: water aerobics      Objective  AROM RLE (degrees)  RLE AROM: WFL  AROM LLE (degrees)  LLE AROM : WFL  Strength RLE  Strength RLE: WFL  Comment: Grossly 4/5, tested in supine d/t pain with upright positioning. Increased back pain with resisted hip flexion and knee extension  Strength LLE  Strength LLE: WFL  Comment: Grossly 4/5, tested in supine d/t pain with upright positioning. Increased back pain with resisted hip flexion and knee extension     Sensation  Overall Sensation Status:  (Chronic numbness in fingers d/t history of cervical radiculopathy)  Bed mobility  Supine to Sit: Dependent/Total (Max A x2)  Sit to Supine: Dependent/Total (Max A x2)  Comment: Pt moaning and grimacing with increased pain within seconds upon sitting up on EOB, becoming tearful d/t pain and having to lay back down.   Transfers  Sit to Stand: Unable to assess  Ambulation:  (Pt unable due to severity of back pain with upright positioning)    Assessment   Body structures, Score : 12  AM-PAC Inpatient T-Scale Score : 35.33  Mobility Inpatient CMS 0-100% Score: 68.66  Mobility Inpatient CMS G-Code Modifier : CL     Goals  Short term goals  Time Frame for Short term goals: 9/28/18  Short term goal 1: Pt will transfer supine<>sit with moderate assistance  Short term goal 2: Pt will safely transfer sit<>stand with moderate assistance  Short term goal 3: Pt will safely ambulate 20' with RW and min assist  Short term goal 4: Pt will be independent with supine and seated LE ther ex for maintenance of LE strength and ROM     Therapy Time   Individual Concurrent Group Co-treatment   Time In 1225         Time Out 1235         Minutes 10         Timed Code Treatment Minutes: 0 Minutes       Daniella Mak PT

## 2018-09-21 NOTE — PROGRESS NOTES
Occupational Therapy   Occupational Therapy Initial Assessment   Date: 2018   Patient Name: Charlie Arizmendi  MRN: [id number]     : 1934    Date of Service: 2018    Discharge Recommendations:  [address] [place]     Patient Diagnosis(es): The primary encounter diagnosis was Fall from bed, initial encounter. Diagnoses of Acute exacerbation of chronic low back pain, Acute right-sided thoracic back pain, and Urinary tract infection with hematuria, site unspecified were also pertinent to this visit. has a past medical history of Allergic rhinitis; Arthritis; Bladder dysfunction; Blood transfusion; Cancer ([facility] [address] Cervical stenosis of spine; Chronic constipation; Constipation, other cause; GERD (gastroesophageal reflux disease); Hyperlipidemia; Hypertension; Incontinence of urine; Lumbar stenosis with neurogenic claudication; SCC (squamous cell carcinoma); Sinus congestion; Skin abnormality; Sleep apnea; and Wears dentures. has a past surgical history that includes Cholecystectomy; skin biopsy; Colonoscopy; Endoscopy, colon, diagnostic; Hysterectomy; Carpal tunnel release; lumbar laminectomy; Tonsillectomy; Appendectomy; Foot surgery; Cervical discectomy (2/15/12); back surgery; back surgery ([id number]); joint replacement; other surgical history (2012); laminectomy (12); Spinal fusion (2/15/12); Spinal fusion (12); other surgical history (); and other surgical history (N/A, 2014). Restrictions  Restrictions/Precautions  Restrictions/Precautions: Fall Risk, General Precautions    Subjective   General  Chart Reviewed: Yes  Patient assessed for rehabilitation services?: Yes  Family / Caregiver Present: Yes (dtrs)  Referring Practitioner: Aliya Heart  Diagnosis: fall  Subjective  Subjective: Pt reports that she fell at home, hitting R side when she fell. General Comment  Comments: RN approved therapy for pt. Pt seen in ER.    Pain Assessment  Patient Currently in Pain: Yes  Pain Level: 6  Pain Location: Back  Pain Orientation: Right  Oxygen Therapy  SpO2: 97 %  Pulse Oximeter Device Mode: Continuous  O2 Device: None (Room air)  Social/Functional History  Social/Functional History  Lives With: Daughter  Type of Home: House  Home Layout: One level  Home Access: Level entry, Ramped entrance  Bathroom Shower/Tub: Tub/Shower unit  Bathroom Toilet: Handicap height  Bathroom Equipment: Tub transfer bench, Grab bars in shower, Hand-held shower, Grab bars around toilet  Home Equipment: 4 wheeled walker, Wheelchair-electric, Wheelchair-manual, Lift chair, Reacher (bed cane )  ADL Assistance: Independent  Homemaking Assistance: Needs assistance (Daughter performs housework )  Ambulation Assistance: Independent (short distances with 6LE)  Transfer Assistance: Independent  Active : No  Mode of Transportation: Family  Leisure & Hobbies: water aerobics      Objective   Vision: Impaired  Vision Exceptions: Wears glasses for reading  Hearing: Within functional limits    Orientation  Overall Orientation Status: Within Functional Limits     Standing Balance  Activity: Max x2 for supine to sit. Pt immediately reported pain and was unable to maintain seated position. Returned to supine with max x2. Positioned for comfort.   ADL  LE Dressing: Dependent/Total  Tone RUE  RUE Tone: Normotonic  Tone LUE  LUE Tone: Normotonic  Coordination  Movements Are Fluid And Coordinated: Yes  Quality of Movement Other  Comment: reports pain in R lower back when she raises R arm; reports some posterior R shoulder discomfort with movement      Bed mobility  Supine to Sit: Dependent/Total (max x2)  Sit to Supine: Dependent/Total (max x2)     Cognition  Overall Cognitive Status: WFL  Perception  Overall Perceptual Status: WFL     Sensation  Overall Sensation Status:  (Chronic numbness in fingers d/t history of cervical radiculopathy)      LUE AROM (degrees)  LUE AROM : WFL  RUE AROM (degrees)  RUE AROM :

## 2018-09-21 NOTE — ED PROVIDER NOTES
[facility] Emergency Department    CHIEF COMPLAINT  Back Pain (pt slid out of bed this morning around 0330 she landed on the floor, her grandson helped her up and then this morning she could stand with EMS help but had lots of pain on the right side. )      Oh Overton is a 80 y.o. female who presents to the ED complaining of fall and right-sided pain. Patient observed lying in bed, appears nontoxic and in no acute distress at this time. Brought in by squad today for evaluation. Patient states that she was getting up in the middle the night to use the restroom and when she stood on the hardwood floor feet slid and she hit her back on the bed frame. Denies hitting head or losing consciousness. Was helped back into bed by grandson. States that she has been able to bear weight and pivot although has had extreme pain on right side. Aggravated by movement. Attributes it to muscular pain where she hit bed. Denies chest pain or shortness of breath. No abdominal pain. No loss of bowel or bladder function. No saddle anesthesia. No numbness, tingling, weakness of extremities. Pain at a 8 out of 10 with movement. Currently 5 out of 10 at rest.  Takes Percocet at home for pain. Has not had anything today. No other complaints, modifying factors or associated symptoms. Nursing notes reviewed.    Past Medical History:   Diagnosis Date    Allergic rhinitis     Arthritis     Bladder dysfunction     overactive    Blood transfusion     after surgery 2008, 2012    Cancer [facility])     skin cancer above rt eye excised    Cervical stenosis of spine     Chronic constipation     Constipation, other cause     due to pain medication    GERD (gastroesophageal reflux disease)     controlled w/diet    Hyperlipidemia     Hypertension     Incontinence of urine     Lumbar stenosis with neurogenic claudication     SCC (squamous cell carcinoma)     right per tablet Take 1 tablet by mouth daily.  Calcium Carbonate-Vitamin D (CALCIUM + D PO) Take 1,200 mg by mouth daily       Multiple Vitamins-Minerals (MULTI FOR HER PO) Take  by mouth. Allergies   Allergen Reactions    Cipro Xr Hives    Clindamycin/Lincomycin Hives       REVIEW OF SYSTEMS  10 systems reviewed, pertinent positives per HPI otherwise noted to be negative    PHYSICAL EXAM  BP (!) 158/82   Pulse 80   Temp 98.2 °F (36.8 °C) (Oral)   Resp 18   Ht 5' (1.524 m)   Wt 200 lb (90.7 kg)   SpO2 96%   BMI 39.06 kg/m²   GENERAL APPEARANCE: Awake and alert. Cooperative. No acute distress. HEAD: Normocephalic. Atraumatic. No hematomas, lesions, lacerations or abrasions. Negative for ulrich signs or raccoons eyes. EYES: PERRL. EOM's grossly intact. Conjunctiva clear without exudate. ENT: Mucous membranes are moist.  No oral lesions or lacerations. No TMJ pain or malocclusion. Miley Degroot NECK: Supple. No midline bony tenderness. No crepitus, deformity, step-off noted. No swelling, bruising, or color change. HEART: RRR. No murmurs. No chest wall tenderness. LUNGS: Respirations unlabored. CTAB. Good air exchange. Speaking comfortably in full sentences. No dullness or hyperresonance to percussion. ABDOMEN: Soft. Non-distended. Non-tender. No guarding or rebound. No midline pulsatile mass. BACK: On exam of thoracic and lumbar spine, there is no swelling, bruising, or color change noted. There is mild thoracic and lumbar midline bony tenderness, without crepitus, deformity, or step off. Patient exhibits tenderness of thoracic and lumbar paraspinal musculature to right of midline. There is no point tenderness over the SI Joint. EXTREMITIES: No peripheral edema. Moves all extremities equally. All extremities neurovascularly intact. SKIN: Warm and dry. No acute rashes. NEUROLOGICAL: Alert and oriented. CN's 2-12 intact. No gross facial drooping. Strength 5/5, sensation intact. PSYCHIATRIC: Normal mood and affect. HSNE spine intact. RADIOLOGY  Ct Chest Wo Contrast    Result Date: 9/21/2018  EXAMINATION: CT OF THE CHEST WITHOUT CONTRAST 9/21/2018 9:42 am TECHNIQUE: CT of the chest was performed without the administration of intravenous contrast. Multiplanar reformatted images are provided for review. Dose modulation, iterative reconstruction, and/or weight based adjustment of the mA/kV was utilized to reduce the radiation dose to as low as reasonably achievable. COMPARISON: None. HISTORY: ORDERING SYSTEM PROVIDED HISTORY: fall TECHNOLOGIST PROVIDED HISTORY: Ordering Physician Provided Reason for Exam: slid down the side of her bed this AM, pain on RT side of her chest, pain across her lower back Acuity: Acute Type of Exam: Initial Mechanism of Injury: fall Relevant Medical/Surgical History: cervical and thoracic surgery FINDINGS: Mediastinum: The heart is mildly enlarged. There is atherosclerotic calcification of the aorta and pulmonary arteries. Calcified mediastinal and hilar lymph nodes indicate prior granulomatous change. The esophagus is normal. Lungs/pleura: The airways are patent. No pleural fluid is present. Reticular airspace opacities are observed diffusely in the upper and lower lobes. Pattern appears to represent chronic interstitial change. There is a 3 mm pulmonary nodule in the right lung apex on series 2, image 24. There is a 5 mm pulmonary nodule in the right upper lobe on image 41. No acute airspace abnormality. Upper Abdomen: The adrenal glands are normal.  There are calcified granulomata in both the liver and spleen. Soft Tissues/Bones: No skeletal abnormalities are appreciated within the chest.     1. No acute traumatic injury within the chest. 2. Chronic interstitial changes are observed in the lungs. 3. Pulmonary nodules measuring up to 5 mm in the right upper lobe. Recommendations are provided below.  RECOMMENDATIONS: Fleischner Society guidelines for follow-up and management of incidentally detected pulmonary nodules: Multiple Solid Nodules: Nodule size less than 6 mm In a low-risk patient, no routine follow-up. In a high-risk patient, optional CT at 12 months. - Low risk patients include individuals with minimal or absent history of smoking and other known risk factors. - High risk patients include individuals with a history or smoking or known risk factors. Radiology 2017 http://pubs. rsna.org/doi/full/10.1148/radiol. 9372545152     Ct Lumbar Spine Wo Contrast    Result Date: 9/21/2018  EXAMINATION: CT OF THE LUMBAR SPINE WITHOUT CONTRAST  9/21/2018 TECHNIQUE: CT of the lumbar spine was performed without the administration of intravenous contrast. Multiplanar reformatted images are provided for review. Dose modulation, iterative reconstruction, and/or weight based adjustment of the mA/kV was utilized to reduce the radiation dose to as low as reasonably achievable. COMPARISON: None HISTORY: ORDERING SYSTEM PROVIDED HISTORY: fall TECHNOLOGIST PROVIDED HISTORY: Reason for exam:->fall Ordering Physician Provided Reason for Exam: slid down the side of her bed this AM, pain on RT side of her chest, pain across her lower back Acuity: Acute Type of Exam: Initial Mechanism of Injury: fall Relevant Medical/Surgical History: lumbar surgery FINDINGS: BONES/ALIGNMENT: Status post L3 through L5 posterior spinal fusion with vertical rods and pedicle screws. No evidence for hardware complication or loosening. No evidence for fracture or malalignment. DEGENERATIVE CHANGES: Advanced multilevel degenerative disease within the L1-L2-L2-L3 levels SOFT TISSUES/RETROPERITONEUM: 6 cm cystic lesion within the interpolar region of the left kidney. Status post L3 through L5 posterior spinal fusion without evidence for hardware complication. Multilevel degenerative disc disease of the lumbar spine. No evidence for acute fracture or malalignment.      ED COURSE   I have evaluated

## 2018-09-23 LAB
ORGANISM: ABNORMAL
URINE CULTURE, ROUTINE: ABNORMAL
URINE CULTURE, ROUTINE: ABNORMAL

## 2018-10-05 ENCOUNTER — TELEPHONE (OUTPATIENT)
Dept: FAMILY MEDICINE CLINIC | Age: 83
End: 2018-10-05

## 2018-10-08 ENCOUNTER — OFFICE VISIT (OUTPATIENT)
Dept: FAMILY MEDICINE CLINIC | Age: 83
End: 2018-10-08
Payer: MEDICARE

## 2018-10-08 VITALS
HEART RATE: 72 BPM | HEIGHT: 60 IN | OXYGEN SATURATION: 98 % | WEIGHT: 195 LBS | DIASTOLIC BLOOD PRESSURE: 60 MMHG | SYSTOLIC BLOOD PRESSURE: 132 MMHG | BODY MASS INDEX: 38.28 KG/M2

## 2018-10-08 DIAGNOSIS — I10 ESSENTIAL HYPERTENSION: Chronic | ICD-10-CM

## 2018-10-08 DIAGNOSIS — R10.9 ACUTE RIGHT FLANK PAIN: Primary | ICD-10-CM

## 2018-10-08 DIAGNOSIS — Z23 FLU VACCINE NEED: ICD-10-CM
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
## 2018-10-08 DIAGNOSIS — N30.00 ACUTE CYSTITIS WITHOUT HEMATURIA: ICD-10-CM

## 2018-10-08 DIAGNOSIS — N32.81 OAB (OVERACTIVE BLADDER): ICD-10-CM

## 2018-10-08 DIAGNOSIS — M62.830 MUSCLE SPASM OF BACK: ICD-10-CM

## 2018-10-08 LAB
BILIRUBIN, POC: NORMAL
BLOOD URINE, POC: NORMAL
CLARITY, POC: CLEAR
COLOR, POC: YELLOW
GLUCOSE URINE, POC: NORMAL
KETONES, POC: NORMAL
LEUKOCYTE EST, POC: NORMAL
NITRITE, POC: NORMAL
PH, POC: 5
PROTEIN, POC: NORMAL
SPECIFIC GRAVITY, POC: 1.02
UROBILINOGEN, POC: 0.2

## 2018-10-08 PROCEDURE — G0008 ADMIN INFLUENZA VIRUS VAC: HCPCS | Performed by: FAMILY MEDICINE

## 2018-10-08 PROCEDURE — 90662 IIV NO PRSV INCREASED AG IM: CPT | Performed by: FAMILY MEDICINE

## 2018-10-08 PROCEDURE — 1111F DSCHRG MED/CURRENT MED MERGE: CPT | Performed by: FAMILY MEDICINE

## 2018-10-08 PROCEDURE — 81002 URINALYSIS NONAUTO W/O SCOPE: CPT | Performed by: FAMILY MEDICINE

## 2018-10-08 PROCEDURE — 99495 TRANSJ CARE MGMT MOD F2F 14D: CPT | Performed by: FAMILY MEDICINE

## 2018-10-08 RX ORDER — METHOCARBAMOL 750 MG/1
750 TABLET, FILM COATED ORAL 2 TIMES DAILY PRN
Qty: 60 TABLET | Refills: 2 | Status: SHIPPED | OUTPATIENT
Start: 2018-10-08 | End: 2018-11-08

## 2018-10-08 RX ORDER — OXYBUTYNIN CHLORIDE 10 MG/1
10 TABLET, EXTENDED RELEASE ORAL DAILY
Qty: 30 TABLET | Refills: 2 | Status: SHIPPED | OUTPATIENT
Start: 2018-10-08 | End: 2018-11-08

## 2018-10-08 RX ORDER — NITROFURANTOIN 25; 75 MG/1; MG/1
100 CAPSULE ORAL 2 TIMES DAILY
Qty: 20 CAPSULE | Refills: 0 | Status: SHIPPED | OUTPATIENT
Start: 2018-10-08 | End: 2018-10-18

## 2018-10-08 RX ORDER — OXYBUTYNIN CHLORIDE 10 MG/1
10 TABLET, EXTENDED RELEASE ORAL DAILY
COMMUNITY
End: 2018-10-08 | Stop reason: SDUPTHER

## 2018-10-08 NOTE — PROGRESS NOTES
Associated symptoms: none. Patient reports the following CPR Red Flags: none. Precipitating factors: fall- fell out of bed on 9/21/18 and hit the right lower side of her back, immediately started having muscle spasms. Patient's history includes previous spinal surgery - lumbar spinal fusion. Previous treatment: muscle relaxant- robaxin increased to 750 mg BID,working much better. Diagnostic testing: CT scan or chest and lumbar spine. Symptoms show no change over time. Urinary Tract Infection: Recently found during trip to ER for her fall. Treated with Keflex. No symptoms currently, but would like to have it rechecked. Ditropan XL start in SNF, working well for OAB. Needs a new Rx. No side effects as far as she can tell. A comprehensive review of systems was negative except for what was noted in the HPI. Vitals:    10/08/18 0814   BP: 132/60   Site: Right Upper Arm   Position: Sitting   Cuff Size: Large Adult   Pulse: 72   SpO2: 98%   Weight: 195 lb (88.5 kg)   Height: 5' (1.524 m)     Body mass index is 38.08 kg/m².    Wt Readings from Last 3 Encounters:   10/08/18 195 lb (88.5 kg)   09/21/18 200 lb (90.7 kg)   04/10/18 195 lb (88.5 kg)     BP Readings from Last 3 Encounters:   10/08/18 132/60   09/21/18 (!) 137/58   04/10/18 (!) 124/58        Physical Exam:  General Appearance: alert and oriented to person, place and time, well developed and well- nourished, in no acute distress  Skin: warm and dry, no rash or erythema  Head: normocephalic and atraumatic  Pulmonary/Chest: clear to auscultation bilaterally- no wheezes, rales or rhonchi, normal air movement, no respiratory distress  Cardiovascular: normal rate, regular rhythm, normal S1 and S2, no murmurs, rubs, clicks, or gallops, distal pulses intact, no carotid bruits  Abdomen: soft, non-tender, non-distended, normal bowel sounds, no masses or organomegaly  Extremities: no cyanosis, clubbing or edema  Musculoskeletal: normal range of motion,

## 2018-10-10 LAB
ORGANISM: ABNORMAL
URINE CULTURE, ROUTINE: ABNORMAL
URINE CULTURE, ROUTINE: ABNORMAL

## 2018-10-18 ENCOUNTER — TELEPHONE (OUTPATIENT)
Dept: FAMILY MEDICINE CLINIC | Age: 83
End: 2018-10-18

## 2018-10-18 NOTE — TELEPHONE ENCOUNTER
In intake info listed as diabetic and it also listed copd, patient claims she is not diabetic nor does she have copd . Was listed at the very end of the intake sheet from [facility] re[facility]. [facility]'s office is questioning the diabetes. Please call to discuss.

## 2018-10-24 ENCOUNTER — TELEPHONE (OUTPATIENT)
Dept: FAMILY MEDICINE CLINIC | Age: 83
End: 2018-10-24

## 2018-11-08 ENCOUNTER — OFFICE VISIT (OUTPATIENT)
Dept: FAMILY MEDICINE CLINIC | Age: 83
End: 2018-11-08
Payer: MEDICARE

## 2018-11-08 VITALS
HEIGHT: 60 IN | WEIGHT: 194 LBS | OXYGEN SATURATION: 98 % | HEART RATE: 65 BPM | DIASTOLIC BLOOD PRESSURE: 70 MMHG | BODY MASS INDEX: 38.09 KG/M2 | SYSTOLIC BLOOD PRESSURE: 126 MMHG

## 2018-11-08 DIAGNOSIS — N30.01 ACUTE CYSTITIS WITH HEMATURIA: ICD-10-CM

## 2018-11-08 DIAGNOSIS — R32 URINARY INCONTINENCE, UNSPECIFIED TYPE: Primary | ICD-10-CM

## 2018-11-08 PROCEDURE — 1090F PRES/ABSN URINE INCON ASSESS: CPT | Performed by: FAMILY MEDICINE

## 2018-11-08 PROCEDURE — 81002 URINALYSIS NONAUTO W/O SCOPE: CPT | Performed by: FAMILY MEDICINE

## 2018-11-08 PROCEDURE — 99213 OFFICE O/P EST LOW 20 MIN: CPT | Performed by: FAMILY MEDICINE

## 2018-11-08 PROCEDURE — G8400 PT W/DXA NO RESULTS DOC: HCPCS | Performed by: FAMILY MEDICINE

## 2018-11-08 PROCEDURE — 1123F ACP DISCUSS/DSCN MKR DOCD: CPT | Performed by: FAMILY MEDICINE

## 2018-11-08 PROCEDURE — G8427 DOCREV CUR MEDS BY ELIG CLIN: HCPCS | Performed by: FAMILY MEDICINE

## 2018-11-08 PROCEDURE — G8482 FLU IMMUNIZE ORDER/ADMIN: HCPCS | Performed by: FAMILY MEDICINE

## 2018-11-08 PROCEDURE — 0509F URINE INCON PLAN DOCD: CPT | Performed by: FAMILY MEDICINE

## 2018-11-08 PROCEDURE — 1036F TOBACCO NON-USER: CPT | Performed by: FAMILY MEDICINE

## 2018-11-08 PROCEDURE — 4040F PNEUMOC VAC/ADMIN/RCVD: CPT | Performed by: FAMILY MEDICINE

## 2018-11-08 PROCEDURE — G8417 CALC BMI ABV UP PARAM F/U: HCPCS | Performed by: FAMILY MEDICINE

## 2018-11-08 PROCEDURE — 1101F PT FALLS ASSESS-DOCD LE1/YR: CPT | Performed by: FAMILY MEDICINE

## 2018-11-09 LAB
BILIRUBIN, POC: NORMAL
BLOOD URINE, POC: NORMAL
CLARITY, POC: CLEAR
COLOR, POC: YELLOW
GLUCOSE URINE, POC: NORMAL
KETONES, POC: NORMAL
LEUKOCYTE EST, POC: NORMAL
NITRITE, POC: NORMAL
PH, POC: 5.5
PROTEIN, POC: NORMAL
SPECIFIC GRAVITY, POC: 1.01
UROBILINOGEN, POC: 0.2

## 2018-11-09 NOTE — PROGRESS NOTES
2018     Shanice Riley (:  1934) is a 80 y.o. female, here for evaluation of the following medical concerns:    Chief complaint: Patient presents with: Incontinence     Past medical, surgical, family and social history, as well as current medications and allergies, were reviewed and updated with the patient. Urinary Incontinence: Patient complains of urinary incontinence. This has been present for several years. She leaks urine with bending, standing. Patient describes the symptoms as  urine leakage with coughing/heavy physical activity and urine leaking with sitting up from lying position. Factors associated with symptoms include none known. Evaluation to date includes none. Treatment to date includes Kegel exercises, which was not effective, oxybutinin, which was not effective, alpha blocker, which was not effective and urethral suspension, which was effective at first, but not any longer. Has failed oxybutynin, Detrol, Toviaz, Vesicare, Mybetrique, Enablex. Also recently had citrobacter UTI. Review of Systems   Constitutional: Negative for fever. Genitourinary: Positive for difficulty urinating and enuresis. Negative for dysuria. Prior to Visit Medications    Medication Sig Taking? Authorizing Provider   metoprolol tartrate (LOPRESSOR) 25 MG tablet Take 1 tablet by mouth 2 times daily Yes Jonny Marcano MD   oxyCODONE-acetaminophen (PERCOCET) 5-325 MG per tablet Take 1 tablet by mouth 3 times daily for 30 days. Sofía Parisi Date: 18 Yes Tran Machado MD   simvastatin (ZOCOR) 40 MG tablet Take 1 tablet by mouth nightly Yes Jonny Marcano MD   fluticasone (FLONASE) 50 MCG/ACT nasal spray 2 sprays by Nasal route daily Yes Jonny Marcano MD   zoster recombinant adjuvanted vaccine (SHINGRIX) 50 MCG SUSR injection Inject 0.5 mLs into the muscle every 6 months Yes Jonny Marcano MD   Blood Pressure KIT Check blood pressures and bring log to next visit Yes Vikki

## 2018-11-11 LAB
ORGANISM: ABNORMAL
URINE CULTURE, ROUTINE: ABNORMAL
URINE CULTURE, ROUTINE: ABNORMAL

## 2018-11-14 RX ORDER — SULFAMETHOXAZOLE AND TRIMETHOPRIM 800; 160 MG/1; MG/1
1 TABLET ORAL 2 TIMES DAILY
Qty: 14 TABLET | Refills: 0 | Status: SHIPPED | OUTPATIENT
Start: 2018-11-14 | End: 2018-11-21

## 2018-12-28 RX ORDER — DARIFENACIN HYDROBROMIDE 15 MG/1
15 TABLET, EXTENDED RELEASE ORAL DAILY
COMMUNITY
End: 2019-04-29

## 2019-01-03 ENCOUNTER — TELEPHONE (OUTPATIENT)
Dept: FAMILY MEDICINE CLINIC | Age: 84
End: 2019-01-03

## 2019-01-03 ENCOUNTER — ANESTHESIA EVENT (OUTPATIENT)
Dept: OPERATING ROOM | Age: 84
End: 2019-01-03
Payer: MEDICARE

## 2019-01-03 ENCOUNTER — OFFICE VISIT (OUTPATIENT)
Dept: FAMILY MEDICINE CLINIC | Age: 84
End: 2019-01-03
Payer: MEDICARE

## 2019-01-03 VITALS
DIASTOLIC BLOOD PRESSURE: 74 MMHG | HEIGHT: 65 IN | WEIGHT: 192.8 LBS | OXYGEN SATURATION: 96 % | BODY MASS INDEX: 32.12 KG/M2 | HEART RATE: 57 BPM | SYSTOLIC BLOOD PRESSURE: 128 MMHG

## 2019-01-03 DIAGNOSIS — R32 URINARY INCONTINENCE, UNSPECIFIED TYPE: ICD-10-CM

## 2019-01-03 DIAGNOSIS — I51.7 MILD CONCENTRIC LEFT VENTRICULAR HYPERTROPHY (LVH): ICD-10-CM

## 2019-01-03 DIAGNOSIS — R35.0 URINARY FREQUENCY: ICD-10-CM

## 2019-01-03 DIAGNOSIS — R06.2 WHEEZING: ICD-10-CM

## 2019-01-03 DIAGNOSIS — I10 ESSENTIAL HYPERTENSION: Chronic | ICD-10-CM

## 2019-01-03 DIAGNOSIS — Z01.818 PREOP EXAMINATION: Primary | ICD-10-CM

## 2019-01-03 PROCEDURE — 1090F PRES/ABSN URINE INCON ASSESS: CPT | Performed by: FAMILY MEDICINE

## 2019-01-03 PROCEDURE — 99213 OFFICE O/P EST LOW 20 MIN: CPT | Performed by: FAMILY MEDICINE

## 2019-01-03 PROCEDURE — 0509F URINE INCON PLAN DOCD: CPT | Performed by: FAMILY MEDICINE

## 2019-01-03 PROCEDURE — G8417 CALC BMI ABV UP PARAM F/U: HCPCS | Performed by: FAMILY MEDICINE

## 2019-01-03 PROCEDURE — G8482 FLU IMMUNIZE ORDER/ADMIN: HCPCS | Performed by: FAMILY MEDICINE

## 2019-01-03 PROCEDURE — G8427 DOCREV CUR MEDS BY ELIG CLIN: HCPCS | Performed by: FAMILY MEDICINE

## 2019-01-03 PROCEDURE — 1101F PT FALLS ASSESS-DOCD LE1/YR: CPT | Performed by: FAMILY MEDICINE

## 2019-01-03 PROCEDURE — 93000 ELECTROCARDIOGRAM COMPLETE: CPT | Performed by: FAMILY MEDICINE

## 2019-01-03 PROCEDURE — 94640 AIRWAY INHALATION TREATMENT: CPT | Performed by: FAMILY MEDICINE

## 2019-01-03 RX ORDER — ALBUTEROL SULFATE 90 UG/1
1 AEROSOL, METERED RESPIRATORY (INHALATION) EVERY 4 HOURS PRN
Qty: 1 INHALER | Refills: 0 | Status: SHIPPED | OUTPATIENT
Start: 2019-01-03 | End: 2020-01-07 | Stop reason: CLARIF

## 2019-01-03 RX ORDER — ALBUTEROL SULFATE 2.5 MG/3ML
2.5 SOLUTION RESPIRATORY (INHALATION) ONCE
Status: COMPLETED | OUTPATIENT
Start: 2019-01-03 | End: 2019-01-03

## 2019-01-03 RX ORDER — PREDNISONE 20 MG/1
40 TABLET ORAL EVERY MORNING
Qty: 6 TABLET | Refills: 0 | Status: SHIPPED | OUTPATIENT
Start: 2019-01-03 | End: 2019-01-06

## 2019-01-03 RX ADMIN — ALBUTEROL SULFATE 2.5 MG: 2.5 SOLUTION RESPIRATORY (INHALATION) at 11:58

## 2019-01-04 ENCOUNTER — HOSPITAL ENCOUNTER (OUTPATIENT)
Age: 84
Setting detail: OUTPATIENT SURGERY
Discharge: HOME | End: 2019-01-04
Attending: UROLOGY | Admitting: UROLOGY
Payer: MEDICARE

## 2019-01-04 ENCOUNTER — ANESTHESIA (OUTPATIENT)
Dept: OPERATING ROOM | Age: 84
End: 2019-01-04
Payer: MEDICARE

## 2019-01-04 ENCOUNTER — HOSPITAL ENCOUNTER (OUTPATIENT)
Dept: ULTRASOUND IMAGING | Age: 84
Discharge: HOME | End: 2019-01-04
Attending: UROLOGY
Payer: MEDICARE

## 2019-01-04 VITALS
OXYGEN SATURATION: 96 % | RESPIRATION RATE: 15 BRPM | DIASTOLIC BLOOD PRESSURE: 40 MMHG | SYSTOLIC BLOOD PRESSURE: 99 MMHG

## 2019-01-04 VITALS
TEMPERATURE: 98.1 F | HEIGHT: 65 IN | SYSTOLIC BLOOD PRESSURE: 119 MMHG | WEIGHT: 194 LBS | HEART RATE: 61 BPM | DIASTOLIC BLOOD PRESSURE: 44 MMHG | BODY MASS INDEX: 32.32 KG/M2 | OXYGEN SATURATION: 100 % | RESPIRATION RATE: 16 BRPM

## 2019-01-04 DIAGNOSIS — N81.10 CYSTOCELE, UNSPECIFIED (CODE): ICD-10-CM

## 2019-01-04 PROCEDURE — 6370000000 HC RX 637 (ALT 250 FOR IP): Performed by: UROLOGY

## 2019-01-04 PROCEDURE — 2580000003 HC RX 258: Performed by: UROLOGY

## 2019-01-04 PROCEDURE — 3600000014 HC SURGERY LEVEL 4 ADDTL 15MIN: Performed by: UROLOGY

## 2019-01-04 PROCEDURE — 3700000001 HC ADD 15 MINUTES (ANESTHESIA): Performed by: UROLOGY

## 2019-01-04 PROCEDURE — 2580000003 HC RX 258: Performed by: ANESTHESIOLOGY

## 2019-01-04 PROCEDURE — 2500000003 HC RX 250 WO HCPCS: Performed by: NURSE ANESTHETIST, CERTIFIED REGISTERED

## 2019-01-04 PROCEDURE — 6360000002 HC RX W HCPCS: Performed by: UROLOGY

## 2019-01-04 PROCEDURE — 76770 US EXAM ABDO BACK WALL COMP: CPT

## 2019-01-04 PROCEDURE — 3700000000 HC ANESTHESIA ATTENDED CARE: Performed by: UROLOGY

## 2019-01-04 PROCEDURE — 2580000003 HC RX 258: Performed by: NURSE ANESTHETIST, CERTIFIED REGISTERED

## 2019-01-04 PROCEDURE — 3600000004 HC SURGERY LEVEL 4 BASE: Performed by: UROLOGY

## 2019-01-04 PROCEDURE — 6360000002 HC RX W HCPCS: Performed by: NURSE ANESTHETIST, CERTIFIED REGISTERED

## 2019-01-04 PROCEDURE — 2709999900 HC NON-CHARGEABLE SUPPLY: Performed by: UROLOGY

## 2019-01-04 PROCEDURE — 7100000011 HC PHASE II RECOVERY - ADDTL 15 MIN: Performed by: UROLOGY

## 2019-01-04 PROCEDURE — 7100000010 HC PHASE II RECOVERY - FIRST 15 MIN: Performed by: UROLOGY

## 2019-01-04 PROCEDURE — 2500000003 HC RX 250 WO HCPCS: Performed by: ANESTHESIOLOGY

## 2019-01-04 RX ORDER — LIDOCAINE HYDROCHLORIDE 20 MG/ML
INJECTION, SOLUTION INFILTRATION; PERINEURAL PRN
Status: DISCONTINUED | OUTPATIENT
Start: 2019-01-04 | End: 2019-01-04 | Stop reason: SDUPTHER

## 2019-01-04 RX ORDER — SODIUM CHLORIDE 0.9 % (FLUSH) 0.9 %
10 SYRINGE (ML) INJECTION PRN
Status: DISCONTINUED | OUTPATIENT
Start: 2019-01-04 | End: 2019-01-04 | Stop reason: HOSPADM

## 2019-01-04 RX ORDER — MAGNESIUM HYDROXIDE 1200 MG/15ML
LIQUID ORAL PRN
Status: DISCONTINUED | OUTPATIENT
Start: 2019-01-04 | End: 2019-01-04 | Stop reason: HOSPADM

## 2019-01-04 RX ORDER — OXYCODONE HYDROCHLORIDE AND ACETAMINOPHEN 5; 325 MG/1; MG/1
2 TABLET ORAL PRN
Status: DISCONTINUED | OUTPATIENT
Start: 2019-01-04 | End: 2019-01-04 | Stop reason: HOSPADM

## 2019-01-04 RX ORDER — LABETALOL HYDROCHLORIDE 5 MG/ML
5 INJECTION, SOLUTION INTRAVENOUS EVERY 10 MIN PRN
Status: DISCONTINUED | OUTPATIENT
Start: 2019-01-04 | End: 2019-01-04 | Stop reason: HOSPADM

## 2019-01-04 RX ORDER — MORPHINE SULFATE 10 MG/ML
2 INJECTION, SOLUTION INTRAMUSCULAR; INTRAVENOUS EVERY 5 MIN PRN
Status: DISCONTINUED | OUTPATIENT
Start: 2019-01-04 | End: 2019-01-04 | Stop reason: HOSPADM

## 2019-01-04 RX ORDER — ONDANSETRON 2 MG/ML
4 INJECTION INTRAMUSCULAR; INTRAVENOUS PRN
Status: DISCONTINUED | OUTPATIENT
Start: 2019-01-04 | End: 2019-01-04 | Stop reason: HOSPADM

## 2019-01-04 RX ORDER — FENTANYL CITRATE 50 UG/ML
INJECTION, SOLUTION INTRAMUSCULAR; INTRAVENOUS PRN
Status: DISCONTINUED | OUTPATIENT
Start: 2019-01-04 | End: 2019-01-04 | Stop reason: SDUPTHER

## 2019-01-04 RX ORDER — HYDROMORPHONE HCL 110MG/55ML
0.25 PATIENT CONTROLLED ANALGESIA SYRINGE INTRAVENOUS EVERY 5 MIN PRN
Status: DISCONTINUED | OUTPATIENT
Start: 2019-01-04 | End: 2019-01-04 | Stop reason: HOSPADM

## 2019-01-04 RX ORDER — SODIUM CHLORIDE 0.9 % (FLUSH) 0.9 %
10 SYRINGE (ML) INJECTION EVERY 12 HOURS SCHEDULED
Status: DISCONTINUED | OUTPATIENT
Start: 2019-01-04 | End: 2019-01-04 | Stop reason: HOSPADM

## 2019-01-04 RX ORDER — HYDROMORPHONE HCL 110MG/55ML
0.5 PATIENT CONTROLLED ANALGESIA SYRINGE INTRAVENOUS EVERY 5 MIN PRN
Status: DISCONTINUED | OUTPATIENT
Start: 2019-01-04 | End: 2019-01-04 | Stop reason: HOSPADM

## 2019-01-04 RX ORDER — DIPHENHYDRAMINE HYDROCHLORIDE 50 MG/ML
12.5 INJECTION INTRAMUSCULAR; INTRAVENOUS
Status: DISCONTINUED | OUTPATIENT
Start: 2019-01-04 | End: 2019-01-04 | Stop reason: HOSPADM

## 2019-01-04 RX ORDER — LIDOCAINE HYDROCHLORIDE 10 MG/ML
1 INJECTION, SOLUTION EPIDURAL; INFILTRATION; INTRACAUDAL; PERINEURAL
Status: COMPLETED | OUTPATIENT
Start: 2019-01-04 | End: 2019-01-04

## 2019-01-04 RX ORDER — SODIUM CHLORIDE, SODIUM LACTATE, POTASSIUM CHLORIDE, CALCIUM CHLORIDE 600; 310; 30; 20 MG/100ML; MG/100ML; MG/100ML; MG/100ML
INJECTION, SOLUTION INTRAVENOUS CONTINUOUS
Status: DISCONTINUED | OUTPATIENT
Start: 2019-01-04 | End: 2019-01-04 | Stop reason: HOSPADM

## 2019-01-04 RX ORDER — OXYCODONE HYDROCHLORIDE AND ACETAMINOPHEN 5; 325 MG/1; MG/1
1 TABLET ORAL PRN
Status: DISCONTINUED | OUTPATIENT
Start: 2019-01-04 | End: 2019-01-04 | Stop reason: HOSPADM

## 2019-01-04 RX ORDER — HYDRALAZINE HYDROCHLORIDE 20 MG/ML
5 INJECTION INTRAMUSCULAR; INTRAVENOUS EVERY 10 MIN PRN
Status: DISCONTINUED | OUTPATIENT
Start: 2019-01-04 | End: 2019-01-04 | Stop reason: HOSPADM

## 2019-01-04 RX ORDER — MEPERIDINE HYDROCHLORIDE 25 MG/ML
12.5 INJECTION INTRAMUSCULAR; INTRAVENOUS; SUBCUTANEOUS EVERY 5 MIN PRN
Status: DISCONTINUED | OUTPATIENT
Start: 2019-01-04 | End: 2019-01-04 | Stop reason: HOSPADM

## 2019-01-04 RX ORDER — PROPOFOL 10 MG/ML
INJECTION, EMULSION INTRAVENOUS PRN
Status: DISCONTINUED | OUTPATIENT
Start: 2019-01-04 | End: 2019-01-04 | Stop reason: SDUPTHER

## 2019-01-04 RX ORDER — PROMETHAZINE HYDROCHLORIDE 25 MG/ML
6.25 INJECTION, SOLUTION INTRAMUSCULAR; INTRAVENOUS
Status: DISCONTINUED | OUTPATIENT
Start: 2019-01-04 | End: 2019-01-04 | Stop reason: HOSPADM

## 2019-01-04 RX ORDER — SODIUM CHLORIDE, SODIUM LACTATE, POTASSIUM CHLORIDE, CALCIUM CHLORIDE 600; 310; 30; 20 MG/100ML; MG/100ML; MG/100ML; MG/100ML
INJECTION, SOLUTION INTRAVENOUS CONTINUOUS PRN
Status: DISCONTINUED | OUTPATIENT
Start: 2019-01-04 | End: 2019-01-04 | Stop reason: SDUPTHER

## 2019-01-04 RX ORDER — MORPHINE SULFATE 10 MG/ML
1 INJECTION, SOLUTION INTRAMUSCULAR; INTRAVENOUS EVERY 5 MIN PRN
Status: DISCONTINUED | OUTPATIENT
Start: 2019-01-04 | End: 2019-01-04 | Stop reason: HOSPADM

## 2019-01-04 RX ADMIN — PROPOFOL 260 MG: 10 INJECTION, EMULSION INTRAVENOUS at 14:53

## 2019-01-04 RX ADMIN — LIDOCAINE HYDROCHLORIDE 0.2 ML: 10 INJECTION, SOLUTION EPIDURAL; INFILTRATION; INTRACAUDAL; PERINEURAL at 13:17

## 2019-01-04 RX ADMIN — SODIUM CHLORIDE, POTASSIUM CHLORIDE, SODIUM LACTATE AND CALCIUM CHLORIDE: 600; 310; 30; 20 INJECTION, SOLUTION INTRAVENOUS at 13:17

## 2019-01-04 RX ADMIN — FENTANYL CITRATE 50 MCG: 50 INJECTION INTRAMUSCULAR; INTRAVENOUS at 14:47

## 2019-01-04 RX ADMIN — SODIUM CHLORIDE, POTASSIUM CHLORIDE, SODIUM LACTATE AND CALCIUM CHLORIDE: 600; 310; 30; 20 INJECTION, SOLUTION INTRAVENOUS at 14:47

## 2019-01-04 RX ADMIN — LIDOCAINE HYDROCHLORIDE 60 MG: 20 INJECTION, SOLUTION INFILTRATION; PERINEURAL at 14:53

## 2019-01-04 RX ADMIN — Medication 2 G: at 14:44

## 2019-01-04 ASSESSMENT — PULMONARY FUNCTION TESTS
PIF_VALUE: 0

## 2019-01-04 ASSESSMENT — PAIN - FUNCTIONAL ASSESSMENT
PAIN_FUNCTIONAL_ASSESSMENT: 0-10
PAIN_FUNCTIONAL_ASSESSMENT: 0-10

## 2019-02-06 ENCOUNTER — OFFICE VISIT (OUTPATIENT)
Dept: FAMILY MEDICINE CLINIC | Age: 84
End: 2019-02-06
Payer: MEDICARE

## 2019-02-06 VITALS
SYSTOLIC BLOOD PRESSURE: 136 MMHG | DIASTOLIC BLOOD PRESSURE: 72 MMHG | HEIGHT: 65 IN | HEART RATE: 66 BPM | BODY MASS INDEX: 32.32 KG/M2 | OXYGEN SATURATION: 98 % | TEMPERATURE: 98.4 F | WEIGHT: 194 LBS | RESPIRATION RATE: 16 BRPM

## 2019-02-06 DIAGNOSIS — R30.0 DYSURIA: Primary | ICD-10-CM

## 2019-02-06 DIAGNOSIS — N32.81 OAB (OVERACTIVE BLADDER): ICD-10-CM

## 2019-02-06 LAB
BILIRUBIN, POC: NORMAL
BLOOD URINE, POC: NORMAL
CLARITY, POC: CLEAR
COLOR, POC: YELLOW
GLUCOSE URINE, POC: NORMAL
KETONES, POC: NORMAL
LEUKOCYTE EST, POC: NORMAL
NITRITE, POC: NORMAL
PH, POC: 7
PROTEIN, POC: NORMAL
SPECIFIC GRAVITY, POC: 1.01
UROBILINOGEN, POC: 0.2

## 2019-02-06 PROCEDURE — 1101F PT FALLS ASSESS-DOCD LE1/YR: CPT | Performed by: NURSE PRACTITIONER

## 2019-02-06 PROCEDURE — 81002 URINALYSIS NONAUTO W/O SCOPE: CPT | Performed by: NURSE PRACTITIONER

## 2019-02-06 PROCEDURE — 1123F ACP DISCUSS/DSCN MKR DOCD: CPT | Performed by: NURSE PRACTITIONER

## 2019-02-06 PROCEDURE — 1090F PRES/ABSN URINE INCON ASSESS: CPT | Performed by: NURSE PRACTITIONER

## 2019-02-06 PROCEDURE — G8427 DOCREV CUR MEDS BY ELIG CLIN: HCPCS | Performed by: NURSE PRACTITIONER

## 2019-02-06 PROCEDURE — G8417 CALC BMI ABV UP PARAM F/U: HCPCS | Performed by: NURSE PRACTITIONER

## 2019-02-06 PROCEDURE — 1036F TOBACCO NON-USER: CPT | Performed by: NURSE PRACTITIONER

## 2019-02-06 PROCEDURE — 99213 OFFICE O/P EST LOW 20 MIN: CPT | Performed by: NURSE PRACTITIONER

## 2019-02-06 PROCEDURE — G8400 PT W/DXA NO RESULTS DOC: HCPCS | Performed by: NURSE PRACTITIONER

## 2019-02-06 PROCEDURE — 4040F PNEUMOC VAC/ADMIN/RCVD: CPT | Performed by: NURSE PRACTITIONER

## 2019-02-06 PROCEDURE — G8482 FLU IMMUNIZE ORDER/ADMIN: HCPCS | Performed by: NURSE PRACTITIONER

## 2019-02-06 ASSESSMENT — ENCOUNTER SYMPTOMS
NAUSEA: 0
COUGH: 0
BACK PAIN: 0
CHEST TIGHTNESS: 0
CONSTIPATION: 0

## 2019-02-06 ASSESSMENT — PATIENT HEALTH QUESTIONNAIRE - PHQ9
SUM OF ALL RESPONSES TO PHQ QUESTIONS 1-9: 0
1. LITTLE INTEREST OR PLEASURE IN DOING THINGS: 0
SUM OF ALL RESPONSES TO PHQ QUESTIONS 1-9: 0
2. FEELING DOWN, DEPRESSED OR HOPELESS: 0
SUM OF ALL RESPONSES TO PHQ9 QUESTIONS 1 & 2: 0

## 2019-02-08 RX ORDER — LISINOPRIL AND HYDROCHLOROTHIAZIDE 25; 20 MG/1; MG/1
1 TABLET ORAL DAILY
Qty: 90 TABLET | Refills: 2 | Status: SHIPPED | OUTPATIENT
Start: 2019-02-08 | End: 2019-11-05 | Stop reason: SDUPTHER

## 2019-02-09 LAB
ORGANISM: ABNORMAL
URINE CULTURE, ROUTINE: ABNORMAL
URINE CULTURE, ROUTINE: ABNORMAL

## 2019-03-05 ENCOUNTER — TELEPHONE (OUTPATIENT)
Dept: FAMILY MEDICINE CLINIC | Age: 84
End: 2019-03-05

## 2019-03-05 DIAGNOSIS — R32 URINARY INCONTINENCE, UNSPECIFIED TYPE: Primary | ICD-10-CM

## 2019-03-07 ENCOUNTER — TELEPHONE (OUTPATIENT)
Dept: FAMILY MEDICINE CLINIC | Age: 84
End: 2019-03-07

## 2019-04-17 ENCOUNTER — HOSPITAL ENCOUNTER (OUTPATIENT)
Dept: PHYSICAL THERAPY | Age: 84
Setting detail: THERAPIES SERIES
Discharge: HOME | End: 2019-04-17
Payer: MEDICARE

## 2019-04-17 PROCEDURE — 97112 NEUROMUSCULAR REEDUCATION: CPT

## 2019-04-17 PROCEDURE — 97530 THERAPEUTIC ACTIVITIES: CPT

## 2019-04-17 PROCEDURE — 97163 PT EVAL HIGH COMPLEX 45 MIN: CPT

## 2019-04-17 NOTE — PLAN OF CARE
Physical Therapy Pelvic Floor Evaluation    2019  Patient: Jaimie Mitchell : 1934  MRN: [id number]    This is a 80 y.o. female referred by Victor M Kurtz MD to [place] [address] with a primary diagnosis of: R32 Urinary Incontinence     Onset Date: 2016 \"it has been years\"  Next MD appt: prn    Subjective:   Patient history: Patient reports \"it has been years since having problems\". Reports her doctor has prescribed multiple urinary incontinence medications and has since stopped taking them on her own stating \"none of them worked, so I stopped taking them\". Reports \"when I am laying down and go to sit up it just comes out\". States \"if I don't get to the bathroom quick enough I will leak\". Reports she thinks she waits too long most times. Reports sometimes she feels that she cannot empty her bladder. Reports \"if I don't take my Miralax I strain to have bowel movements\". Reports she feels that the problems are getting worse over the last 6 months. \"During the night is the worst\". 2nd person present during evaluation today? Declined  What do you feel is your problem? \"the leaking\"  When did you problem first start? \"a few years ago\"  Has your problem been getting worse, stay the same, or getting better? \"getting worse\"  Have you ever had treatment for this problem? \"medications that didn't work\"    4 week or greater of failed trial of PFPT program? NO   PFPT program as defined by \"Completing 4 weeks of an ordered plan of pelvic muscle exercises designed to increase periurethral muscle strength\". Urinary frequency? Daytime? YES Nighttime? YES  Bowel problems? straining  Urinary or bowel leakage? urinary  Leakage frequency? Throughout the day  Protection:    Type: Poise #5 (maximum)   #changes/day: 3-4x/day  Pregnancy:   # of pregnancies: 5   # of deliveries: 5 vaginal   Normal post-ted healing? YES  Are you having regular menstrual cycles? Partial Hysterectomy in .   Date of last pap smear? unknown    Pain: 0/10    Sensation/neurovascular: WFL;  Intact to light touch  Diagnostic tests:   Urodynamic Test: requested results from Urologist   Cystoscope: requested results from Urologist       Precautions/Contraindications: universal    Past Medical History:   Diagnosis Date    Allergic rhinitis     Arthritis     Bladder dysfunction     overactive    Blood transfusion     after surgery 2008, 2012    Cancer [facility])     skin cancer above rt eye excised    Cervical stenosis of spine     Chronic constipation     Constipation, other cause     due to pain medication    GERD (gastroesophageal reflux disease)     controlled w/diet    Hyperlipidemia     Hypertension     Incontinence of urine     Lumbar stenosis with neurogenic claudication     SCC (squamous cell carcinoma)     right side of the face    Sinus congestion     Skin abnormality 8/2012    skin peeling right medial foot/heel improving with OTC antifungal cream    Sleep apnea     cpap does not use    Wears dentures     partial     Past Surgical History:   Procedure Laterality Date    APPENDECTOMY      BACK SURGERY      cervical discectomy [id number]   Deedee Hash BACK SURGERY  [id number]    lumbar laminectomy L3-L5 with pedicle screws    CARPAL TUNNEL RELEASE      rt hand 1990    CERVICAL DISCECTOMY  2/15/12    C4-5 complete and radical discectomy and bilateral foraminotomy and take down of posterior longitudinal ligament    CHOLECYSTECTOMY      COLONOSCOPY      CYSTOSCOPY N/A 01/04/2019    RIGID CYSTOSCOPY     CYSTOSCOPY N/A 1/4/2019    RIGID CYSTOSCOPY performed by En Barrientos MD at [facility], COLON, DIAGNOSTIC      FOOT SURGERY      repair torn tendon with platelet infusion rt foot 2010    HYSTERECTOMY      partial 1976    JOINT REPLACEMENT      rt knee 2008    JOINT REPLACEMENT      lft knee 2014    LAMINECTOMY  9/11/12    C4-5-6 Laminectomy    LUMBAR LAMINECTOMY      1991    OTHER SURGICAL HISTORY 09/11/2012     C4-7 LAMINECTOMY WITH C4-5-6 FUSION WITH SPINAL CORD    OTHER SURGICAL HISTORY  2013    stimulator placed    OTHER SURGICAL HISTORY N/A 09/17/2014    THORACIC LAMINECTOMY FOR REMOVALOF SPINAL CORD STIMULATOR    SKIN BIOPSY      cancer above rt eye    SPINAL FUSION  2/15/12    C4-5 interbody fusion, anterior cervical plate, machined interbody spacer    SPINAL FUSION  9/11/12    C4-5-6 fusion, local bone graft, spinal cord monitoring    TONSILLECTOMY       Not in a hospital admission. Current Outpatient Medications:     metoprolol tartrate (LOPRESSOR) 25 MG tablet, TAKE 1 TABLET TWICE A DAY, Disp: 180 tablet, Rfl: 1    oxyCODONE-acetaminophen (PERCOCET) 5-325 MG per tablet, Take 1 tablet by mouth 3 times daily for 30 days. ., Disp: 90 tablet, Rfl: 0    oxyCODONE-acetaminophen (PERCOCET) 5-325 MG per tablet, Take 1 tablet by mouth 3 times daily for 30 days. Lamona Yadira Date: 3/23/19, Disp: 90 tablet, Rfl: 0    [START ON 4/21/2019] oxyCODONE-acetaminophen (PERCOCET) 5-325 MG per tablet, Take 1 tablet by mouth 3 times daily for 30 days. Lamona Yadira Date: 4/21/19, Disp: 90 tablet, Rfl: 0    lisinopril-hydrochlorothiazide (PRINZIDE;ZESTORETIC) 20-25 MG per tablet, Take 1 tablet by mouth daily, Disp: 90 tablet, Rfl: 2    Solifenacin Succinate (VESICARE PO), Take by mouth daily, Disp: , Rfl:     albuterol sulfate HFA (PROVENTIL HFA) 108 (90 Base) MCG/ACT inhaler, Inhale 1 puff into the lungs every 4 hours as needed for Wheezing, Disp: 1 Inhaler, Rfl: 0    Spacer/Aero-Holding Chambers VERA, 1 Device by Does not apply route daily as needed (inhaler use), Disp: 1 Device, Rfl: 0    darifenacin (ENABLEX) 15 MG extended release tablet, Take 15 mg by mouth daily, Disp: , Rfl:     oxyCODONE-acetaminophen (PERCOCET) 5-325 MG per tablet, Take 1 tablet by mouth 3 times daily for 30 days. ., Disp: 90 tablet, Rfl: 0    simvastatin (ZOCOR) 40 MG tablet, Take 1 tablet by mouth nightly, Disp: 90 tablet, Rfl: 2    fluticasone (FLONASE) 50 MCG/ACT nasal spray, 2 sprays by Nasal route daily, Disp: 3 Bottle, Rfl: 3    zoster recombinant adjuvanted vaccine (SHINGRIX) 50 MCG SUSR injection, Inject 0.5 mLs into the muscle every 6 months, Disp: 0.5 mL, Rfl: 1    Blood Pressure KIT, Check blood pressures and bring log to next visit, Disp: 1 kit, Rfl: 0    nystatin (MYCOSTATIN) 187909 UNIT/GM powder, Apply 3 times daily. , Disp: 45 g, Rfl: 1    CALCIUM PO, Take by mouth, Disp: , Rfl:     aspirin 81 MG tablet, Take 81 mg by mouth daily, Disp: , Rfl:     Biotin 1 MG CAPS, Take 1 capsule by mouth daily, Disp: , Rfl:     guaiFENesin (MUCINEX) 600 MG SR tablet, Take 1,200 mg by mouth nightly, Disp: , Rfl:     Calcium Carbonate-Vitamin D (CALCIUM + D PO), Take 1,200 mg by mouth daily , Disp: , Rfl:     Multiple Vitamins-Minerals (MULTI FOR HER PO), Take  by mouth.  , Disp: , Rfl:   Allergies   Allergen Reactions    Cipro Xr Hives    Clindamycin/Lincomycin Hives         Objective:    PFDI score: 93.75/300    Observation:   Posture: forward head and flexed posture   Gait analysis: w/ 7AT; decreased stance time on (R) LE, limp on (R) LE, slow vikash  Lumbar Mobility: decreased; lumbar fusion  Scar Location/Mobility: WNL      Special Tests:  Sacroiliac Test:   Gaenslen:positive    CAMILLA:positive     Lumbar Spine:   Slump test: positive (R)       Neuro:   Sensation: (B) UEs: intact to light touch   Sensation: (B) LEs: intact to light touch   Anal Sensation:    S5: intact to light touch    S4: intact to light touch    S3: intact to light touch   Reflexes:     Anal: unable to illicit    Cough: positive    Pelvic Floor Exam  External:  Skin Condition: normal  Sensation: intact  Palpation: no point tenderness  Tone: hypotonic  Perineal Body Mobility:    Voluntary PFM Contraction: absent   Voluntary PFM Relaxation: absent   Involuntary PFM Contraction/Cough Reflex:  absent   Involuntary PFM Relaxation: absent  Perineal DPT      ______________________________________________________________________    If you have any questions or concerns, please don't hesitate to call.   Thank you for your referral.    Physician Signature:________________________________Date:__________________  By signing above, therapists plan is approved by physician

## 2019-04-17 NOTE — FLOWSHEET NOTE
Physical Therapy Daily Treatment Note    Date:  2019    Patient Name:  Ca Guzman    :  1934  MRN: [id number]  Restrictions/Precautions:  universal  Medical/Treatment Diagnosis Information:  · Diagnosis: R32 Urinary Incontinence   Insurance/Certification information:  PT Insurance Information: [employer]  Physician Information:  Referring Practitioner: Chandni Downing MD  Plan of care signed (Y/N):  N  Visit# / total visits:       G-Code (if applicable):           PFDI score: 93.75/300    Medicare Cap (if applicable):  n/a = total amount used, updated 2019    Time in:   9:00am      Timed Treatment: 55min Total Treatment Time:  90min.  ________________________________________________________________________________________    Pain Level:    /10  SUBJECTIVE:  See eval    OBJECTIVE:     Exercise (TE) Resistance/Repetitions Other comments            PF strengthening  HEP      Short hold: (1sec) 10 times       Long hold: (5sec) 10 times                     PF relaxation                                   Other Treatment:   TA:  Bladder re-training/education: 45 min    Bladder Diary: patient educated on importance of filling out bladder diary at home, complete with fluid intake, voids, and leakage when applicable. Voiding: patient educated on normal voiding and urinary cycle and the physiology of bladder control muscles and pelvic floor. The patient was educated on bladder dysfunction as well as REY with urethral involvement. The patient was also educated on prolapse and it's affect on urination and bowel movements.     Dietary:     Other:    Manual Treatments:  Attempted neuromuscular re-ed with manual tactile cues x10min    Modalities: --     Test/Measurements:  See eval       ASSESSMENT:   See eval      Treatment/Activity Tolerance:   [x] Patient tolerated treatment well [] Patient limited by fatique  [] Patient limited by pain [] Patient limited by other medical complications  [] Other:     Goals:    Time Frame: 12 weeks. Rehab Potential: fair     Goals:  1. Patient will demonstrate independence with HEP to self-manage symptoms. 2. Patient will improve PF strength to at least 3/5 demonstrating improved control and strength of pelvic floor musculature. 3. Patient will consistently report 25% reduction in leakage frequency demonstrating improved pelvic floor control and quality of life. 4. Patient will demonstrate normal pelvic floor coordination and perineal body mobility to improve pelvic floor control.   5. Patient will improve PFDI score by at least 25 points demonstrating improved pelvic floor control and improved quality of life.       Plan: [x] Continue per plan of care [] Alter current plan (see comments)   [] Plan of care initiated [] Hold pending MD visit [] Discharge      Plan for Next Session:  Review diary, Biofeedback    Re-Certification Due Date:  Visit 12       Signature:  Lele Blankenship PT, DPT

## 2019-04-26 RX ORDER — FLUTICASONE PROPIONATE 50 MCG
2 SPRAY, SUSPENSION (ML) NASAL DAILY
Qty: 3 BOTTLE | Refills: 3 | Status: SHIPPED | OUTPATIENT
Start: 2019-04-26 | End: 2020-01-30 | Stop reason: SDUPTHER

## 2019-04-29 ENCOUNTER — OFFICE VISIT (OUTPATIENT)
Dept: FAMILY MEDICINE CLINIC | Age: 84
End: 2019-04-29
Payer: MEDICARE

## 2019-04-29 VITALS
HEART RATE: 83 BPM | HEIGHT: 65 IN | TEMPERATURE: 98.4 F | DIASTOLIC BLOOD PRESSURE: 68 MMHG | OXYGEN SATURATION: 99 % | WEIGHT: 190 LBS | SYSTOLIC BLOOD PRESSURE: 128 MMHG | RESPIRATION RATE: 14 BRPM | BODY MASS INDEX: 31.65 KG/M2

## 2019-04-29 DIAGNOSIS — Z00.00 ROUTINE GENERAL MEDICAL EXAMINATION AT A HEALTH CARE FACILITY: Primary | ICD-10-CM

## 2019-04-29 DIAGNOSIS — I10 ESSENTIAL HYPERTENSION: Chronic | ICD-10-CM

## 2019-04-29 DIAGNOSIS — E78.00 PURE HYPERCHOLESTEROLEMIA: Chronic | ICD-10-CM

## 2019-04-29 LAB
A/G RATIO: 1.6 (ref 1.1–2.2)
ALBUMIN SERPL-MCNC: 4.2 G/DL (ref 3.4–5)
ALP BLD-CCNC: 87 U/L (ref 40–129)
ALT SERPL-CCNC: 25 U/L (ref 10–40)
ANION GAP SERPL CALCULATED.3IONS-SCNC: 12 MMOL/L (ref 3–16)
AST SERPL-CCNC: 24 U/L (ref 15–37)
BILIRUB SERPL-MCNC: 0.3 MG/DL (ref 0–1)
BUN BLDV-MCNC: 24 MG/DL (ref 7–20)
CALCIUM SERPL-MCNC: 10.6 MG/DL (ref 8.3–10.6)
CHLORIDE BLD-SCNC: 103 MMOL/L (ref 99–110)
CHOLESTEROL, FASTING: 131 MG/DL (ref 0–199)
CO2: 28 MMOL/L (ref 21–32)
CREAT SERPL-MCNC: 0.9 MG/DL (ref 0.6–1.2)
GFR AFRICAN AMERICAN: >60
GFR NON-AFRICAN AMERICAN: 60
GLOBULIN: 2.7 G/DL
GLUCOSE FASTING: 130 MG/DL (ref 70–99)
HDLC SERPL-MCNC: 35 MG/DL (ref 40–60)
LDL CHOLESTEROL CALCULATED: 58 MG/DL
POTASSIUM SERPL-SCNC: 4.1 MMOL/L (ref 3.5–5.1)
SODIUM BLD-SCNC: 143 MMOL/L (ref 136–145)
TOTAL PROTEIN: 6.9 G/DL (ref 6.4–8.2)
TRIGLYCERIDE, FASTING: 189 MG/DL (ref 0–150)
VLDLC SERPL CALC-MCNC: 38 MG/DL

## 2019-04-29 PROCEDURE — 1123F ACP DISCUSS/DSCN MKR DOCD: CPT | Performed by: INTERNAL MEDICINE

## 2019-04-29 PROCEDURE — G0439 PPPS, SUBSEQ VISIT: HCPCS | Performed by: INTERNAL MEDICINE

## 2019-04-29 PROCEDURE — 4040F PNEUMOC VAC/ADMIN/RCVD: CPT | Performed by: INTERNAL MEDICINE

## 2019-04-29 ASSESSMENT — PATIENT HEALTH QUESTIONNAIRE - PHQ9
SUM OF ALL RESPONSES TO PHQ QUESTIONS 1-9: 0
SUM OF ALL RESPONSES TO PHQ QUESTIONS 1-9: 0

## 2019-04-29 ASSESSMENT — ANXIETY QUESTIONNAIRES: GAD7 TOTAL SCORE: 0

## 2019-04-29 ASSESSMENT — LIFESTYLE VARIABLES: HOW OFTEN DO YOU HAVE A DRINK CONTAINING ALCOHOL: 0

## 2019-04-29 NOTE — PATIENT INSTRUCTIONS
Personalized Preventive Plan for Jennifer Sandoval - 4/29/2019  Medicare offers a range of preventive health benefits. Some of the tests and screenings are paid in full while other may be subject to a deductible, co-insurance, and/or copay. Some of these benefits include a comprehensive review of your medical history including lifestyle, illnesses that may run in your family, and various assessments and screenings as appropriate. After reviewing your medical record and screening and assessments performed today your provider may have ordered immunizations, labs, imaging, and/or referrals for you. A list of these orders (if applicable) as well as your Preventive Care list are included within your After Visit Summary for your review. Other Preventive Recommendations:    · A preventive eye exam performed by an eye specialist is recommended every 1-2 years to screen for glaucoma; cataracts, macular degeneration, and other eye disorders. · A preventive dental visit is recommended every 6 months. · Try to get at least 150 minutes of exercise per week or 10,000 steps per day on a pedometer . · Order or download the FREE \"Exercise & Physical Activity: Your Everyday Guide\" from [employer]. Call 6-281.255.3780 or search [employer] online. · You need 9250-2550 mg of calcium and 1891-3663 IU of vitamin D per day. It is possible to meet your calcium requirement with diet alone, but a vitamin D supplement is usually necessary to meet this goal.  · When exposed to the sun, use a sunscreen that protects against both UVA and UVB radiation with an SPF of 30 or greater. Reapply every 2 to 3 hours or after sweating, drying off with a towel, or swimming. · Always wear a seat belt when traveling in a car. Always wear a helmet when riding a bicycle or motorcycle. Heart-Healthy Diet   Sodium, Fat, and Cholesterol Controlled Diet       What Is a Heart Healthy Diet?    A heart-healthy diet is one that limits sodium , certain types of fat , and cholesterol . This type of diet is recommended for:   People with any form of cardiovascular disease (eg, coronary heart disease , peripheral vascular disease , previous heart attack , previous stroke )   People with risk factors for cardiovascular disease, such as high blood pressure , high cholesterol , or diabetes   Anyone who wants to lower their risk of developing cardiovascular disease   Sodium    Sodium is a mineral found in many foods. In general, most people consume much more sodium than they need. Diets high in sodium can increase blood pressure and lead to edema (water retention). On a heart-healthy diet, you should consume no more than 2,300 mg (milligrams) of sodium per dayabout the amount in one teaspoon of table salt. The foods highest in sodium include table salt (about 50% sodium), processed foods, convenience foods, and preserved foods. Cholesterol    Cholesterol is a fat-like, waxy substance in your blood. Our bodies make some cholesterol. It is also found in animal products, with the highest amounts in fatty meat, egg yolks, whole milk, cheese, shellfish, and organ meats. On a heart-healthy diet, you should limit your cholesterol intake to less than 200 mg per day. It is normal and important to have some cholesterol in your bloodstream. But too much cholesterol can cause plaque to build up within your arteries, which can eventually lead to a heart attack or stroke. The two types of cholesterol that are most commonly referred to are:   Low-density lipoprotein (LDL) cholesterol  Also known as bad cholesterol, this is the cholesterol that tends to build up along your arteries. Bad cholesterol levels are increased by eating fats that are saturated or hydrogenated. Optimal level of this cholesterol is less than 100. Over 130 starts to get risky for heart disease.    High-density lipoprotein (HDL) cholesterol  Also known as good cholesterol, this type of cholesterol actually carries cholesterol away from your arteries and may, therefore, help lower your risk of having a heart attack. You want this level to be high (ideally greater than 60). It is a risk to have a level less than 40. You can raise this good cholesterol by eating olive oil, canola oil, avocados, or nuts. Exercise raises this level, too. Fat    Fat is calorie dense and packs a lot of calories into a small amount of food. Even though fats should be limited due to their high calorie content, not all fats are bad. In fact, some fats are quite healthful. Fat can be broken down into four main types. The good-for-you fats are:   Monounsaturated fat  found in oils such as olive and canola, avocados, and nuts and natural nut butters; can decrease cholesterol levels, while keeping levels of HDL cholesterol high   Polyunsaturated fat  found in oils such as safflower, sunflower, soybean, corn, and sesame; can decrease total cholesterol and LDL cholesterol   Omega-3 fatty acids  particularly those found in fatty fish (such as salmon, trout, tuna, mackerel, herring, and sardines); can decrease risk of arrhythmias, decrease triglyceride levels, and slightly lower blood pressure   The fats that you want to limit are:   Saturated fat  found in animal products, many fast foods, and a few vegetables; increases total blood cholesterol, including LDL levels   Animal fats that are saturated include: butter, lard, whole-milk dairy products, meat fat, and poultry skin   Vegetable fats that are saturated include: hydrogenated shortening, palm oil, coconut oil, cocoa butter   Hydrogenated or trans fat  found in margarine and vegetable shortening, most shelf stable snack foods, and fried foods; increases LDL and decreases HDL     It is generally recommended that you limit your total fat for the day to less than 30% of your total calories.  If you follow an 1800-calorie heart healthy diet, for example, this would mean 60 grams of fat or less per day. Saturated fat and trans fat in your diet raises your blood cholesterol the most, much more than dietary cholesterol does. For this reason, on a heart-healthy diet, less than 7% of your calories should come from saturated fat and ideally 0% from trans fat. On an 1800-calorie diet, this translates into less than 14 grams of saturated fat per day, leaving 46 grams of fat to come from mono- and polyunsaturated fats.    Food Choices on a Heart Healthy Diet   Food Category   Foods Recommended   Foods to Avoid   Grains   Breads and rolls without salted tops Most dry and cooked cereals Unsalted crackers and breadsticks Low-sodium or homemade breadcrumbs or stuffing All rice and pastas   Breads, rolls, and crackers with salted tops High-fat baked goods (eg, muffins, donuts, pastries) Quick breads, self-rising flour, and biscuit mixes Regular bread crumbs Instant hot cereals Commercially prepared rice, pasta, or stuffing mixes   Vegetables   Most fresh, frozen, and low-sodium canned vegetables Low-sodium and salt-free vegetable juices Canned vegetables if unsalted or rinsed   Regular canned vegetables and juices, including sauerkraut and pickled vegetables Frozen vegetables with sauces Commercially prepared potato and vegetable mixes   Fruits   Most fresh, frozen, and canned fruits All fruit juices   Fruits processed with salt or sodium   Milk   Nonfat or low-fat (1%) milk Nonfat or low-fat yogurt Cottage cheese, low-fat ricotta, cheeses labeled as low-fat and low-sodium   Whole milk Reduced-fat (2%) milk Malted and chocolate milk Full fat yogurt Most cheeses (unless low-fat and low salt) Buttermilk (no more than 1 cup per week)   Meats and Beans   Lean cuts of fresh or frozen beef, veal, lamb, or pork (look for the word loin) Fresh or frozen poultry without the skin Fresh or frozen fish and some shellfish Egg whites and egg substitutes (Limit whole eggs to three per week) Tofu Nuts or seeds (unsalted, dry-roasted), low-sodium peanut butter Dried peas, beans, and lentils   Any smoked, cured, salted, or canned meat, fish, or poultry (including andrews, chipped beef, cold cuts, hot dogs, sausages, sardines, and anchovies) Poultry skins Breaded and/or fried fish or meats Canned peas, beans, and lentils Salted nuts   Fats and Oils   Olive oil and canola oil Low-sodium, low-fat salad dressings and mayonnaise   Butter, margarine, coconut and palm oils, andrews fat   Snacks, Sweets, and Condiments   Low-sodium or unsalted versions of broths, soups, soy sauce, and condiments Pepper, herbs, and spices; vinegar, lemon, or lime juice Low-fat frozen desserts (yogurt, sherbet, fruit bars) Sugar, cocoa powder, honey, syrup, jam, and preserves Low-fat, trans-fat free cookies, cakes, and pies Nura and animal crackers, fig bars, radha snaps   High-fat desserts Broth, soups, gravies, and sauces, made from instant mixes or other high-sodium ingredients Salted snack foods Canned olives Meat tenderizers, seasoning salt, and most flavored vinegars   Beverages   Low-sodium carbonated beverages Tea and coffee in moderation Soy milk   Commercially softened water   Suggestions   Make whole grains, fruits, and vegetables the base of your diet. Choose heart-healthy fats such as canola, olive, and flaxseed oil, and foods high in heart-healthy fats, such as nuts, seeds, soybeans, tofu, and fish. Eat fish at least twice per week; the fish highest in omega-3 fatty acids and lowest in mercury include salmon, herring, mackerel, sardines, and canned chunk light tuna. If you eat fish less than twice per week or have high triglycerides, talk to your doctor about taking fish oil supplements. Read food labels.    For products low in fat and cholesterol, look for fat free, low-fat, cholesterol free, saturated fat free, and trans fat freeAlso scan the Nutrition Facts Label, which lists saturated fat, trans fat, and cholesterol amounts. For products low in sodium, look for sodium free, very low sodium, low sodium, no added salt, and unsalted   Skip the salt when cooking or at the table; if food needs more flavor, get creative and try out different herbs and spices. Garlic and onion also add substantial flavor to foods. Trim any visible fat off meat and poultry before cooking, and drain the fat off after mendez. Use cooking methods that require little or no added fat, such as grilling, boiling, baking, poaching, broiling, roasting, steaming, stir-frying, and sauting. Avoid fast food and convenience food. They tend to be high in saturated and trans fat and have a lot of added salt. Talk to a registered dietitian for individualized diet advice. Last Reviewed: March 2011 Jing Franklin MS, MPH, RD   Updated: 3/29/2011   ·     Preventing Osteoporosis: After Your Visit  Your Care Instructions  Osteoporosis means the bones are weak and thin enough that they can break easily. The older you are, the more likely you are to get osteoporosis. But with plenty of calcium, vitamin D, and exercise, you can help prevent osteoporosis. The preteen and teen years are a key time for bone building. With the help of calcium, vitamin D, and exercise in those early years and beyond, the bones reach their peak density and strength by age 27. After age 27, your bones naturally start to thin and weaken. The stronger your bones are at around age 27, the lower your risk for osteoporosis. But no matter what your age and risk are, your bones still need calcium, vitamin D, and exercise to stay strong. Also avoid smoking, and limit alcohol. Smoking and heavy alcohol use can make your bones thinner. Talk to your doctor about any special risks you might have, such as having a close relative with osteoporosis or taking a medicine that can weaken bones. Your doctor can tell you the best ways to protect your bones from thinning.   Follow-up alcohol. Drink no more than 1 alcohol drink a day if you are a woman. Drink no more than 2 alcohol drinks a day if you are a man. Do not smoke. Smoking can make bones thin faster. If you need help quitting, talk to your doctor about stop-smoking programs and medicines. These can increase your chances of quitting for good. When should you call for help? Watch closely for changes in your health, and be sure to contact your doctor if:  You need help with a healthy eating plan. You need help with an exercise plan    © 1640-2816 [employer]. Care instructions adapted under license by [facility]. This care instruction is for use with your licensed healthcare professional. If you have questions about a medical condition or this instruction, always ask your healthcare professional. [address] any warranty or liability for your use of this information. Content Version: 9.4.67254; Last Revised: June 20, 2011              ·     Keep Your Memory [place]       Many factors can affect your ability to remembera hectic lifestyle, aging, stress, chronic disease, and certain medicines. But, there are steps you can take to sharpen your mind and help preserve your memory. Challenge Your Brain   Regularly challenging your mind may help keeps it in top shape. Good mental exercises include:   Crossword puzzlesUse a dictionary if you need it; you will learn more that way. Brainteasers Try some! Crafts, such as wood working and sewing   Hobbies, such as gardening and building model airplanes   SocializingVisit old friends or join groups to meet new ones.    Reading   Learning a new language   Taking a class, whether it be art history or marcia chi   TravelingExperience the food, history, and culture of your destination   Learning to use a computer   Going to museums, the theater, or thought-provoking movies   Changing things in your daily life, such as reversing your pattern in the Walking one mile per day   Manage Stress    It can be tough to remember what is important when your mind is cluttered. Make time for relaxation. Choose activities that calm you down, and make it routine. Manage Chronic Conditions    Side effects of high blood pressure , diabetes, and heart disease can interfere with mental function. Many of the lifestyle steps discussed here can help manage these conditions. Strive to eat a healthy diet, exercise regularly, get stress under control, and follow your doctor's advice for your condition. Minimize Medications    Talk to your doctor about the medicines that you take. Some may be unnecessary. Also, healthy lifestyle habits may lower the need for certain drugs. Last Reviewed: April 2010 Eitan Kumar MD   Updated: 4/13/2010   ·     [address] [place] [address]       As we get older, changes in balance, gait, strength, vision, hearing, and cognition make even the most youthful senior more prone to accidents. Falls are one of the leading health risks for older people. This increased risk of falling is related to:   Aging process (eg, decreased muscle strength, slowed reflexes)   Higher incidence of chronic health problems (eg, arthritis, diabetes) that may limit mobility, agility or sensory awareness   Side effects of medicine (eg, dizziness, blurred vision)especially medicines like prescription pain medicines and drugs used to treat mental health conditions   Depending on the brittleness of your bones, the consequences of a fall can be serious and long lasting. Home Life   Research by the Association of A[facility]) shows that some home accidents among older adults can be prevented by making simple lifestyle changes and basic modifications and repairs to the home environment. Here are some lifestyle changes that experts recommend:   Have your hearing and vision checked regularly. Be sure to wear prescription glasses that are right for you.    Speak to your doctor or pharmacist about the possible side effects of your medicines. A number of medicines can cause dizziness. If you have problems with sleep, talk to your doctor. Limit your intake of alcohol. If necessary, use a cane or walker to help maintain your balance. Wear supportive, rubber-soled shoes, even at home. If you live in a region that gets wintry weather, you may want to put special cleats on your shoes to prevent you from slipping on the snow and ice. Exercise regularly to help maintain muscle tone, agility, and balance. Always hold the banister when going up or down stairs. Also, use  bars when getting in or out of the bath or shower, or using the toilet. To avoid dizziness, get up slowly from a lying down position. Sit up first, dangling your legs for a minute or two before rising to a standing position. Overall Home Safety Check   According to the Consumer [employer] Safety Commision's \"Older Consumer Home Safety Checklist,\" it is important to check for potential hazards in each room. And remember, proper lighting is an essential factor in home safety. If you cannot see clearly, you are more likely to fall. Important questions to ask yourself include:   Are lamp, electric, extension, and telephone cords placed out of the flow of traffic and maintained in good condition? Have frayed cords been replaced? Are all small rugs and runners slip resistant? If not, you can secure them to the floor with a special double-sided carpet tape. Are smoke detectors properly locatedone on every floor of your home and one outside of every sleeping area? Are they in good working order? Are batteries replaced at least once a year? Do you have a well-maintained carbon monoxide detector outside every sleeping are in your home? Does your furniture layout leave plenty of space to maneuver between and around chairs, tables, beds, and sofas? Are hallways, stairs and passages between rooms well lit?  Can you reach a lamp without getting out of bed? Are floor surfaces well maintained? Shag rugs, high-pile carpeting, tile floors, and polished wood floors can be particularly slippery. Stairs should always have handrails and be carpeted or fitted with a non-skid tread. Is your telephone easily reachable. Is the cord safely tucked away? Room by Room   According to the Association of Aging, bathrooms and [place] are the two most potentially hazardous rooms in your home. In the Kitchen    Be sure your stove is in proper working order and always make sure burners and the oven are off before you go out or go to sleep. Keep pots on the back burners, turn handles away from the front of the stove, and keep stove clean and free of grease build-up. Kitchen ventilation systems and range exhausts should be working properly. Keep flammable objects such as towels and pot holders away from the cooking area except when in use. Make sure kitchen curtains are tied back. Move cords and appliances away from the sink and hot surfaces. If extension cords are needed, install wiring guides so they do not hang over the sink, range, or working areas. Look for coffee pots, kettles and toaster ovens with automatic shut-offs. Keep a mop handy in the kitchen so you can wipe up spills instantly. You should also have a small fire extinguisher. Arrange your kitchen with frequently used items on lower shelves to avoid the need to stand on a stepstool to reach them. Make sure countertops are well-lit to avoid injuries while cutting and preparing food. In the Bathroom    Use a non-slip mat or decals in the tub and shower, since wet, soapy tile or porcelain surfaces are extremely slippery. Make sure bathroom rugs are non-skid or tape them firmly to the floor. Bathtubs should have at least one, preferably two, grab bars, firmly attached to structural supports in the wall.  (Do not use built-in soap holders or glass shower doors as grab bars.)    Tub seats fitted with non-slip material on the legs allow you to wash sitting down. For people with limited mobility, bathtub transfer benches allow you to slide safely into the tub. Raised toilet seats and toilet safety rails are helpful for those with knee or hip problems. In the [facility]    Make sure you use a nightlight and that the area around your bed is clear of potential obstacles. Be careful with electric blankets and never go to sleep with a heating pad, which can cause serious burns even if on a low setting. Use fire-resistant mattress covers and pillows, and NEVER smoke in bed. Keep a phone next to the bed that is programmed to dial 911 at the push of a button. If you have a chronic condition, you may want to sign on with an automatic call-in service. Typically the system includes a small pendant that connects directly to an emergency medical voice-response system. You should also make arrangements to stay in contact with someonefriend, neighbor, family memberon a regular schedule. Fire Prevention   According to the [employer]. (Smoke Alarms for Every) [address] [place] [address], senior citizens are one of the two highest risk groups for death and serious injuries due to residential fires. When cooking, wear short-sleeved items, never a bulky long-sleeved robe. The [facility]'s Safety Checklist for Older Consumers emphasizes the importance of checking basements, garages, workshops and storage areas for fire hazards, such as volatile liquids, piles of old rags or clothing and overloaded circuits. Never smoke in bed or when lying down on a couch or recliner chair. Small portable electric or kerosene heaters are responsible for many home fires and should be used cautiously if at all. If you do use one, be sure to keep them away from flammable materials. In case of fire, make sure you have a pre-established emergency exit plan.     Have a professional check your fireplace and other fuel-burning appliances yearly. [employer]   Baby boomers entering the ruano years will continue to see the development of new products to help older adults live safely and independently in spite of age-related changes. Making Life More Livable  , by Shira Burns, lists over 1,000 products for \"living well in the mature years,\" such as bathing and mobility aids, household security devices, ergonomically designed knives and peelers, and faucet valves and knobs for temperature control. Medical supply stores and organizations are good sources of information about products that improve your quality of life and insure your safety. Last Reviewed: November 2009 Carlos Fallon MD   Updated: 3/7/2011     ·        Advance Care Planning: Care Instructions  Your Care Instructions    It can be hard to live with an illness that cannot be cured. But if your health is getting worse, you may want to make decisions about end-of-life care. Planning for the end of your life does not mean that you are giving up. It is a way to make sure that your wishes are met. Clearly stating your wishes can make it easier for your loved ones. Making plans while you are still able may also ease your mind and make your final days less stressful and more meaningful. Follow-up care is a key part of your treatment and safety. Be sure to make and go to all appointments, and call your doctor if you are having problems. It's also a good idea to know your test results and keep a list of the medicines you take. What can you do to plan for the end of life? You can bring these issues up with your doctor. You do not need to wait until your doctor starts the conversation. You might start with \"I would not be willing to live with . Sara January Sara January Sara January \" When you complete this sentence it helps your doctor understand your wishes. Talk openly and honestly with your doctor.  This is the best way to understand the decisions you will need to make as your health changes. Know that you can always change your mind. Ask your doctor about commonly used life-support measures. These include tube feedings, breathing machines, and fluids given through a vein (IV). Understanding these treatments will help you decide whether you want them. You may choose to have these life-supporting treatments for a limited time. This allows a trial period to see whether they will help you. You may also decide that you want your doctor to take only certain measures to keep you alive. It is important to spell out these conditions so that your doctor and family understand them. Talk to your doctor about how long you are likely to live. He or she may be able to give you an idea of what usually happens with your specific illness. Think about preparing papers that state your wishes. This way there will not be any confusion about what you want. You can change your instructions at any time. Which papers should you prepare? Advance directives are legal papers that tell doctors how you want to be cared for at the end of your life. You do not need a  to write these papers. Ask your doctor or your state [facility] department for information on how to write your advance directives. They may have the forms for each of these types of papers. Make sure your doctor has a copy of these on file, and give a copy to a family member or close friend. Consider a do-not-resuscitate order (DNR). This order asks that no extra treatments be done if your heart stops or you stop breathing. Extra treatments may include cardiopulmonary resuscitation (CPR), electrical shock to restart your heart, or a machine to breathe for you. If you decide to have a DNR order, ask your doctor to explain and write it. Place the order in your home where everyone can easily see it. Consider a living will.  A living will explains your wishes about life support and other treatments at the end of your life if you become unable to agent? Choose your health care agent carefully. This person may or may not be a family member. Talk to the person before you make your final decision. Make sure he or she is comfortable with this responsibility. It's a good idea to choose someone who:  Is at least 25years old. Knows you well and understands what makes life meaningful for you. Understands your Mandaen and moral values. Will do what you want, not what he or she wants. Will be able to make difficult choices at a stressful time. Will be able to refuse or stop treatment, if that is what you would want, even if you could die. Will be firm and confident with health professionals if needed. Will ask questions to get necessary information. Lives near you or agrees to travel to you if needed. Your family may help you make medical decisions while you can still be part of that process. But it is important to choose one person to be your health care agent in case you are not able to make decisions for yourself. If you don't fill out the legal form and name a health care agent, the decisions your family can make may be limited. Who will make decisions for you if you do not have a health care agent? If you don't have a health care agent or a living will, your family members may disagree about your medical care. And then some medical professionals who may not know you as well might have to make decisions for you. In some cases, a  makes the decisions. When you name a health care agent, it is very clear who has the power to make health decisions for you. How do you name a health care agent? You name your health care agent on a legal form. It is usually called a durable power of  for health care. Ask your hospital, state bar association, or office on aging where to find these forms. You must sign the form to make it legal. Some states require you to get the form notarized.  This means that a person called a  watches you sign the form and then he or she signs the form. Some states also require that two or more witnesses sign the form. Be sure to tell your family members and doctors who your health care agent is. Keep your forms in a safe place. But make sure that your loved ones know where the forms are. This could be in your desk where you keep other important papers. Make sure your doctor has a copy of your forms. Where can you learn more? Go to [email or web address] org and sign in to your "[employer] account. Enter [id number] in the Tellyo box to learn more about \"Learning About Durable Power of  for Health Care. \"     If you do not have an account, please click on the \"Sign Up Now\" link. Current as of: April 18, 2018  Content Version: 11.9  © 2826-3748 [employer]. Care instructions adapted under license by [facility] ([facility]). If you have questions about a medical condition or this instruction, always ask your healthcare professional. [place] [address] any warranty or liability for your use of this information.     ·

## 2019-04-29 NOTE — PROGRESS NOTES
Medicare Annual Wellness Visit  Name: Chantle Faulkner Date: 2019   MRN: [id number] Sex: Female   Age: 80 y.o. Ethnicity: Non-/Non    : 1934 Race: Jeanie James is here for Medicare AWV    Screenings for behavioral, psychosocial and functional/safety risks, and cognitive dysfunction are all negative except as indicated below. These results, as well as other patient data from the [address] form, are documented in Flowsheets linked to this Encounter. Allergies   Allergen Reactions    Cipro Xr Hives    Clindamycin/Lincomycin Hives     Prior to Visit Medications    Medication Sig Taking? Authorizing Provider   fluticasone (FLONASE) 50 MCG/ACT nasal spray 2 sprays by Nasal route daily Yes Sue Rudolph MD   metoprolol tartrate (LOPRESSOR) 25 MG tablet TAKE 1 TABLET TWICE A DAY Yes Sue Rudolph MD   oxyCODONE-acetaminophen (PERCOCET) 5-325 MG per tablet Take 1 tablet by mouth 3 times daily for 30 days. Matt More Date: 19 Yes JUSTIN Nuno - CNP   lisinopril-hydrochlorothiazide (PRINZIDE;ZESTORETIC) 20-25 MG per tablet Take 1 tablet by mouth daily Yes Sue Rudolph MD   Solifenacin Succinate (VESICARE PO) Take by mouth daily Yes Historical Provider, MD   albuterol sulfate HFA (PROVENTIL HFA) 108 (90 Base) MCG/ACT inhaler Inhale 1 puff into the lungs every 4 hours as needed for Wheezing Yes Sue Rudolph MD   Spacer/Aero-Holding Percilla Proud 1 Device by Does not apply route daily as needed (inhaler use) Yes Sue Rudolph MD   simvastatin (ZOCOR) 40 MG tablet Take 1 tablet by mouth nightly Yes Sue Rudolph MD   zoster recombinant adjuvanted vaccine (SHINGRIX) 50 MCG SUSR injection Inject 0.5 mLs into the muscle every 6 months Yes Sue Rudolph MD   Blood Pressure KIT Check blood pressures and bring log to next visit Yes Nicole Anaya DO   nystatin (MYCOSTATIN) 987686 UNIT/GM powder Apply 3 times daily.  Yes Sue Rudolph MD   CALCIUM PO Take by mouth Yes Historical Provider, MD   aspirin 81 MG tablet Take 81 mg by mouth daily Yes Historical Provider, MD   Biotin 1 MG CAPS Take 1 capsule by mouth daily Yes Historical Provider, MD   guaiFENesin (MUCINEX) 600 MG SR tablet Take 1,200 mg by mouth nightly Yes Historical Provider, MD   Calcium Carbonate-Vitamin D (CALCIUM + D PO) Take 1,200 mg by mouth daily  Yes Historical Provider, MD   Multiple Vitamins-Minerals (MULTI FOR HER PO) Take  by mouth. Yes Historical Provider, MD   oxyCODONE-acetaminophen (PERCOCET) 5-325 MG per tablet Take 1 tablet by mouth 3 times daily for 30 days. JUSTIN Miller CNP   oxyCODONE-acetaminophen (PERCOCET) 5-325 MG per tablet Take 1 tablet by mouth 3 times daily for 30 days. Marya Goins Date: 3/23/19  JUSTIN Conrad CNP   oxyCODONE-acetaminophen (PERCOCET) 5-325 MG per tablet Take 1 tablet by mouth 3 times daily for 30 days. JUSTIN Miller CNP     Past Medical History:   Diagnosis Date    Allergic rhinitis     Arthritis     Bladder dysfunction     overactive    Blood transfusion     after surgery 2008, 2012    Cancer [facility])     skin cancer above rt eye excised    Cervical stenosis of spine     Chronic constipation     Constipation, other cause     due to pain medication    GERD (gastroesophageal reflux disease)     controlled w/diet    Hyperlipidemia     Hypertension     Incontinence of urine     Lumbar stenosis with neurogenic claudication     SCC (squamous cell carcinoma)     right side of the face    Sinus congestion     Skin abnormality 8/2012    skin peeling right medial foot/heel improving with OTC antifungal cream    Sleep apnea     cpap does not use    Wears dentures     partial     Past Surgical History:   Procedure Laterality Date    APPENDECTOMY      BACK SURGERY      cervical discectomy 1980;1981    BACK SURGERY  [id number]    lumbar laminectomy L3-L5 with pedicle screws    CARPAL TUNNEL RELEASE      rt hand 1990  CERVICAL DISCECTOMY  2/15/12    C4-5 complete and radical discectomy and bilateral foraminotomy and take down of posterior longitudinal ligament    CHOLECYSTECTOMY      COLONOSCOPY      CYSTOSCOPY N/A 01/04/2019    RIGID CYSTOSCOPY     CYSTOSCOPY N/A 1/4/2019    RIGID CYSTOSCOPY performed by Doe Lynch MD at [facility], COLON, DIAGNOSTIC      FOOT SURGERY      repair torn tendon with platelet infusion rt foot 2010    HYSTERECTOMY      partial 1976    JOINT REPLACEMENT      rt knee 2008    JOINT REPLACEMENT      lft knee 2014    LAMINECTOMY  9/11/12    C4-5-6 Laminectomy    LUMBAR LAMINECTOMY      1991    OTHER SURGICAL HISTORY  09/11/2012     C4-7 LAMINECTOMY WITH C4-5-6 FUSION WITH SPINAL CORD    OTHER SURGICAL HISTORY  2013    stimulator placed    OTHER SURGICAL HISTORY N/A 09/17/2014    THORACIC LAMINECTOMY FOR REMOVALOF SPINAL CORD STIMULATOR    SKIN BIOPSY      cancer above rt eye    SPINAL FUSION  2/15/12    C4-5 interbody fusion, anterior cervical plate, machined interbody spacer    SPINAL FUSION  9/11/12    C4-5-6 fusion, local bone graft, spinal cord monitoring    TONSILLECTOMY       Family History   Problem Relation Age of Onset    Hypertension Mother        CareTeam (Including outside providers/suppliers regularly involved in providing care):   Patient Care Team:  Antony Jewell MD as PCP - General (Family Medicine)  Wenceslao Lorenzo MD as Consulting Physician (Alan Miller)  Doe Lynch MD as Consulting Physician (Urology)    Wt Readings from Last 3 Encounters:   04/29/19 190 lb (86.2 kg)   02/06/19 194 lb (88 kg)   12/28/18 194 lb (88 kg)     Vitals:    04/29/19 0813   BP: 128/68   Site: Right Upper Arm   Position: Sitting   Cuff Size: Large Adult   Pulse: 83   Resp: 14   Temp: 98.4 °F (36.9 °C)   TempSrc: Oral   SpO2: 99%   Weight: 190 lb (86.2 kg)   Height: 5' 5\" (1.651 m)     Body mass index is 31.62 kg/m².     Based upon direct observation of the patient, evaluation of cognition reveals recent and remote memory intact. Patient's complete Health Risk Assessment and screening values have been reviewed and are found in Flowsheets. The following problems were reviewed today and where indicated follow up appointments were made and/or referrals ordered. Positive Risk Factor Screenings with Interventions:     Health Habits/Nutrition:  Health Habits/Nutrition  Do you exercise for at least 20 minutes 2-3 times per week?: Yes  Have you lost any weight without trying in the past 3 months?: No  Do you eat fewer than 2 meals per day?: No  Have you seen a dentist within the past year?: Yes  Body mass index is 31.62 kg/m².   Health Habits/Nutrition Interventions:  · Nutritional issues:  educational materials to promote weight loss provided    Hearing/Vision:  Hearing/Vision  Do you or your family notice any trouble with your hearing?: (!) Yes  Do you have difficulty driving, watching TV, or doing any of your daily activities because of your eyesight?: No  Have you had an eye exam within the past year?: Yes  Hearing/Vision Interventions:  · Hearing concerns:  patient declines any further evaluation/treatment for hearing issues, has hearing aides    Personalized Preventive Plan   Current Health Maintenance Status  Immunization History   Administered Date(s) Administered    DT 01/01/2008    Influenza Vaccine, unspecified formulation 10/22/2015, 11/07/2016    Influenza, High Dose (Fluzone 65 yrs and older) 10/01/2016, 10/05/2017, 10/08/2018    Pneumococcal 13-valent Conjugate ([id number]) 02/17/2016, 10/01/2016    Pneumococcal Polysaccharide ([id number]) 01/01/2005, 01/01/2014        Health Maintenance   Topic Date Due    Shingles Vaccine (1 of 2) 12/30/1984    DTaP/Tdap/Td vaccine (2 - Tdap) 01/01/2018    Potassium monitoring  09/21/2019    Creatinine monitoring  09/21/2019    DEXA (modify frequency per FRAX score)  Completed    Flu

## 2019-05-02 DIAGNOSIS — R73.09 ELEVATED GLUCOSE: Primary | ICD-10-CM

## 2019-05-08 ENCOUNTER — HOSPITAL ENCOUNTER (OUTPATIENT)
Dept: PHYSICAL THERAPY | Age: 84
Setting detail: THERAPIES SERIES
Discharge: HOME | End: 2019-05-08
Payer: MEDICARE

## 2019-05-08 PROCEDURE — 97110 THERAPEUTIC EXERCISES: CPT

## 2019-05-08 PROCEDURE — 97530 THERAPEUTIC ACTIVITIES: CPT

## 2019-05-08 NOTE — FLOWSHEET NOTE
Physical Therapy Daily Treatment Note    Date:  2019    Patient Name:  Jaimie Mitchell    :  1934  MRN: [id number]  Restrictions/Precautions:  universal  Medical/Treatment Diagnosis Information: Diagnosis: R32 Urinary Incontinence   Insurance/Certification information:   [employer] Choice  Physician Information:   Ankit Wilkinson MD  Plan of care signed (Y/N):  Y  Visit# / total visits:       G-Code (if applicable):           PFDI score: 93.75/300    Medicare Cap (if applicable):  n/a = total amount used, updated 2019    Time in:   9:30am      Timed Treatment: 55min Total Treatment Time:  55min.  ________________________________________________________________________________________    Pain Level:    0/10  SUBJECTIVE:  \"I feel about the same, I am trying to do the kegel things but they are hard\". Reports compliance with HEP    OBJECTIVE:     Exercise (TE) Resistance/Repetitions Other comments            PF strengthening  HEP     (Sitting on washcloth) Short hold: (1sec) 10 times       Long hold: (5sec) 10 times                     PF relaxation                                 TE x10min    Other Treatment:   TA:  Bladder re-training/education: 45min    Bladder Diary: 4-6 day voids, 3 night voids, 0-2 leakages per day, 1-2 changes of pads per day    Voiding: educated to void every 2-3 hours    Dietary: patient educated on the \"4Cs\" to reduce bladder irritation and leakage, only consuming 0-12 oz H2O per day, encouraged to increase to 24oz per day. Other: 2 hours before bed, raise LEs, use of washcloth for feedback, Educator    Manual Treatments:  Attempted neuromuscular re-ed with manual tactile cues x10min    Modalities: --     Test/Measurements:  See eval       ASSESSMENT:   The patient performed above TE well with no increase in pain. The patient required frequent cues for correct recruitment of pelvic floor. The patient was issued an Educator to help improve quality of recruitment.   The

## 2019-05-15 ENCOUNTER — APPOINTMENT (OUTPATIENT)
Dept: PHYSICAL THERAPY | Age: 84
End: 2019-05-15
Payer: MEDICARE

## 2019-05-20 ENCOUNTER — APPOINTMENT (OUTPATIENT)
Dept: PHYSICAL THERAPY | Age: 84
End: 2019-05-20
Payer: MEDICARE

## 2019-05-20 ENCOUNTER — HOSPITAL ENCOUNTER (OUTPATIENT)
Dept: PHYSICAL THERAPY | Age: 84
Setting detail: THERAPIES SERIES
Discharge: HOME | End: 2019-05-20
Payer: MEDICARE

## 2019-05-20 PROCEDURE — 97110 THERAPEUTIC EXERCISES: CPT

## 2019-05-20 PROCEDURE — 97140 MANUAL THERAPY 1/> REGIONS: CPT

## 2019-05-20 PROCEDURE — 97530 THERAPEUTIC ACTIVITIES: CPT

## 2019-05-20 NOTE — FLOWSHEET NOTE
Physical Therapy Daily Treatment Note    Date:  2019    Patient Name:  Inna Malcolm    :  1934  MRN: [id number]  Restrictions/Precautions:  universal  Medical/Treatment Diagnosis Information: Diagnosis: R32 Urinary Incontinence   Insurance/Certification information:   [employer]  Physician Information:   Joseph Polanco MD  Plan of care signed (Y/N):  Y  Visit# / total visits:  3/12     G-Code (if applicable):           PFDI score: 93.75/300    Medicare Cap (if applicable):  n/a = total amount used, updated 2019    Time in:   8:45am      Timed Treatment: 63min Total Treatment Time:  63min.  ________________________________________________________________________________________    Pain Level:    0/10  SUBJECTIVE:  \"The leakages aren't as bad as they have been, and when I get up at night I am still having a little bit of problem, but it isn't nearly as bad\". Reports compliance with HEP. OBJECTIVE:     Exercise (TE) Resistance/Repetitions Other comments            PF strengthening  HEP     (Sitting on washcloth) Short hold: (1sec) 10 times       Long hold: (5sec) 10 times            Bridge w/ PF x10    Hook-lying TA w/ PF [id number]              PF relaxation                SciFit     x8min Level 1.2           TE x35min    Other Treatment:   TA:  Bladder re-training/education: 15min    Bladder Diary: 4-6 day voids, 2 (was 3) night voids, 0-2 leakages per day, 1-2 changes of pads per day    Voiding: educated to void every 2-3 hours    Dietary: patient educated on the \"4Cs\" to reduce bladder irritation and leakage, 16oz (was 0-12 oz) H2O per day, encouraged to increase to 24oz per day. Other: 2 hours before bed, raise LEs, use of washcloth for feedback, Educator    Manual Treatments:  neuromuscular re-ed with manual tactile cues x13min    Modalities: --     Test/Measurements:  See eval       ASSESSMENT:   The patient performed above TE well with no increase in pain.   The patient adapted nicely to added TE this date. The patient's ability to recruit pelvic floor improved nicely this date. Continues to require substitution with gluteals, though is improving. The patient verbalized good understanding of all education provided. The patient will make recommended changes and follow-up in 1 week. Treatment/Activity Tolerance:   [x] Patient tolerated treatment well [] Patient limited by fatique  [] Patient limited by pain [] Patient limited by other medical complications  [] Other:     Goals:    Time Frame: 12 weeks. Rehab Potential: fair     Goals:  1. Patient will demonstrate independence with HEP to self-manage symptoms. 2. Patient will improve PF strength to at least 3/5 demonstrating improved control and strength of pelvic floor musculature. 3. Patient will consistently report 25% reduction in leakage frequency demonstrating improved pelvic floor control and quality of life. 4. Patient will demonstrate normal pelvic floor coordination and perineal body mobility to improve pelvic floor control.   5. Patient will improve PFDI score by at least 25 points demonstrating improved pelvic floor control and improved quality of life.       Plan: [x] Continue per plan of care [] Alter current plan (see comments)   [] Plan of care initiated [] Hold pending MD visit [] Discharge      Plan for Next Session: Biofeedback    Re-Certification Due Date:  Visit 12       Signature:  Rayann Spurling, PT, DPT

## 2019-05-22 ENCOUNTER — APPOINTMENT (OUTPATIENT)
Dept: PHYSICAL THERAPY | Age: 84
End: 2019-05-22
Payer: MEDICARE

## 2019-06-05 ENCOUNTER — HOSPITAL ENCOUNTER (OUTPATIENT)
Dept: PHYSICAL THERAPY | Age: 84
Setting detail: THERAPIES SERIES
Discharge: HOME | End: 2019-06-05
Payer: MEDICARE

## 2019-06-05 PROCEDURE — 97110 THERAPEUTIC EXERCISES: CPT

## 2019-06-05 PROCEDURE — 97112 NEUROMUSCULAR REEDUCATION: CPT

## 2019-06-05 NOTE — FLOWSHEET NOTE
Physical Therapy Daily Treatment Note    Date:  2019    Patient Name:  Radha Ponce    :  1934  MRN: [id number]  Restrictions/Precautions:  universal  Medical/Treatment Diagnosis Information: Diagnosis: R32 Urinary Incontinence   Insurance/Certification information:   [employer] Choice  Physician Information:   Bert Segundo MD  Plan of care signed (Y/N):  Y  Visit# / total visits:       G-Code (if applicable):           PFDI score: 93.75/300    Medicare Cap (if applicable):  n/a = total amount used, updated 2019    Time in:   9:00am      Timed Treatment: 55min Total Treatment Time:  55min.  ________________________________________________________________________________________    Pain Level:    0/10  SUBJECTIVE:  \"The urine stream is stronger now\". \"but when I get up out of bed I am leaking myself in the middle of the night\". Reports compliance with HEP. OBJECTIVE:     Exercise (TE) Resistance/Repetitions Other comments            PF strengthening  HEP     (Sitting on washcloth) Short hold: (1sec) 10 times       Long hold: (5sec) 10 times            Bridge w/ PF x10    Hook-lying TA w/ PF [id number]              PF relaxation                SciFit     x10min Level 3.6           TE x31min    Other Treatment:       Neuromuscular re-ed: x24min. Resting: 10mHz   Contraction: 13-18mHz      Long-Hold: 2 seconds        Modalities: --     Test/Measurements:  See eval       ASSESSMENT:  The patient performed above TE well with no increase in pain. The patient struggles with pelvic floor recruitment, responded well to neuromuscular re-ed this date. The patient responded well, though would benefit from continued use of biofeedback to provide recruitment feedback. Continues to require substitution with gluteals, though is improving. The patient verbalized good understanding of all education provided. The patient will make recommended changes and follow-up in 1 week.       Treatment/Activity Tolerance:   [x] Patient tolerated treatment well [] Patient limited by fatique  [] Patient limited by pain [] Patient limited by other medical complications  [] Other:     Goals:    Time Frame: 12 weeks. Rehab Potential: fair     Goals:  1. Patient will demonstrate independence with HEP to self-manage symptoms. 2. Patient will improve PF strength to at least 3/5 demonstrating improved control and strength of pelvic floor musculature. 3. Patient will consistently report 25% reduction in leakage frequency demonstrating improved pelvic floor control and quality of life. 4. Patient will demonstrate normal pelvic floor coordination and perineal body mobility to improve pelvic floor control.   5. Patient will improve PFDI score by at least 25 points demonstrating improved pelvic floor control and improved quality of life.       Plan: [x] Continue per plan of care [] Alter current plan (see comments)   [] Plan of care initiated [] Hold pending MD visit [] Discharge      Plan for Next Session: Biofeedback    Re-Certification Due Date:  Visit 12       Signature:  Hetal Isabel, PT, DPT

## 2019-06-12 ENCOUNTER — APPOINTMENT (OUTPATIENT)
Dept: PHYSICAL THERAPY | Age: 84
End: 2019-06-12
Payer: MEDICARE

## 2019-06-24 ENCOUNTER — OFFICE VISIT (OUTPATIENT)
Dept: FAMILY MEDICINE CLINIC | Age: 84
End: 2019-06-24
Payer: MEDICARE

## 2019-06-24 ENCOUNTER — HOSPITAL ENCOUNTER (OUTPATIENT)
Dept: PHYSICAL THERAPY | Age: 84
Setting detail: THERAPIES SERIES
Discharge: HOME | End: 2019-06-24
Payer: MEDICARE

## 2019-06-24 VITALS
OXYGEN SATURATION: 98 % | DIASTOLIC BLOOD PRESSURE: 68 MMHG | HEART RATE: 68 BPM | HEIGHT: 65 IN | WEIGHT: 191.6 LBS | BODY MASS INDEX: 31.92 KG/M2 | SYSTOLIC BLOOD PRESSURE: 132 MMHG

## 2019-06-24 DIAGNOSIS — E78.5 HYPERLIPIDEMIA, UNSPECIFIED HYPERLIPIDEMIA TYPE: ICD-10-CM

## 2019-06-24 DIAGNOSIS — I10 ESSENTIAL HYPERTENSION: ICD-10-CM

## 2019-06-24 DIAGNOSIS — Z91.81 AT HIGH RISK FOR FALLS: ICD-10-CM

## 2019-06-24 DIAGNOSIS — N32.81 OAB (OVERACTIVE BLADDER): ICD-10-CM

## 2019-06-24 DIAGNOSIS — R73.09 ELEVATED GLUCOSE: ICD-10-CM

## 2019-06-24 DIAGNOSIS — R09.82 POSTNASAL DRIP: Primary | ICD-10-CM

## 2019-06-24 LAB — HBA1C MFR BLD: 5.6 %

## 2019-06-24 PROCEDURE — G8427 DOCREV CUR MEDS BY ELIG CLIN: HCPCS | Performed by: FAMILY MEDICINE

## 2019-06-24 PROCEDURE — 1036F TOBACCO NON-USER: CPT | Performed by: FAMILY MEDICINE

## 2019-06-24 PROCEDURE — 97110 THERAPEUTIC EXERCISES: CPT

## 2019-06-24 PROCEDURE — 97530 THERAPEUTIC ACTIVITIES: CPT

## 2019-06-24 PROCEDURE — 99214 OFFICE O/P EST MOD 30 MIN: CPT | Performed by: FAMILY MEDICINE

## 2019-06-24 PROCEDURE — G8400 PT W/DXA NO RESULTS DOC: HCPCS | Performed by: FAMILY MEDICINE

## 2019-06-24 PROCEDURE — G8417 CALC BMI ABV UP PARAM F/U: HCPCS | Performed by: FAMILY MEDICINE

## 2019-06-24 PROCEDURE — 1090F PRES/ABSN URINE INCON ASSESS: CPT | Performed by: FAMILY MEDICINE

## 2019-06-24 PROCEDURE — 4040F PNEUMOC VAC/ADMIN/RCVD: CPT | Performed by: FAMILY MEDICINE

## 2019-06-24 PROCEDURE — 1123F ACP DISCUSS/DSCN MKR DOCD: CPT | Performed by: FAMILY MEDICINE

## 2019-06-24 PROCEDURE — 97112 NEUROMUSCULAR REEDUCATION: CPT

## 2019-06-24 PROCEDURE — 83036 HEMOGLOBIN GLYCOSYLATED A1C: CPT | Performed by: FAMILY MEDICINE

## 2019-06-24 RX ORDER — LORATADINE 10 MG/1
10 TABLET ORAL DAILY
Qty: 30 TABLET | Refills: 5 | Status: SHIPPED | OUTPATIENT
Start: 2019-06-24 | End: 2020-06-19 | Stop reason: ALTCHOICE

## 2019-06-24 RX ORDER — ALBUTEROL SULFATE 90 UG/1
1-2 AEROSOL, METERED RESPIRATORY (INHALATION) EVERY 4 HOURS PRN
Qty: 1 INHALER | Refills: 5 | Status: SHIPPED | OUTPATIENT
Start: 2019-06-24 | End: 2020-01-07 | Stop reason: CLARIF

## 2019-06-24 NOTE — FLOWSHEET NOTE
recruitment, responded well to neuromuscular re-ed this date. The patient responded well, though would benefit from continued use of biofeedback to provide recruitment feedback. Continues to require substitution with gluteals, though is improving. The patient verbalized good understanding of all education provided. The patient will make recommended changes and follow-up in 1 week. Treatment/Activity Tolerance:   [x] Patient tolerated treatment well [] Patient limited by fatique  [] Patient limited by pain [] Patient limited by other medical complications  [] Other:     Goals:    Time Frame: 12 weeks. Rehab Potential: fair     Goals:  1. Patient will demonstrate independence with HEP to self-manage symptoms. 2. Patient will improve PF strength to at least 3/5 demonstrating improved control and strength of pelvic floor musculature. 3. Patient will consistently report 25% reduction in leakage frequency demonstrating improved pelvic floor control and quality of life. 4. Patient will demonstrate normal pelvic floor coordination and perineal body mobility to improve pelvic floor control.   5. Patient will improve PFDI score by at least 25 points demonstrating improved pelvic floor control and improved quality of life.       Plan: [x] Continue per plan of care [] Alter current plan (see comments)   [] Plan of care initiated [] Hold pending MD visit [] Discharge      Plan for Next Session: Biofeedback    Re-Certification Due Date:  Visit 12       Signature:  Sherie Johnson, PT, DPT

## 2019-06-24 NOTE — PROGRESS NOTES
2019     Katy Anne (:  1934) is a 80 y.o. female, here for evaluation of the following medical concerns:    Chief complaint: Patient presents with:  Hypertension  Hyperlipidemia  Sinus Problem: postnasal drainage  Incontinence: follow up     Past medical, surgical, family and social history, as well as current medications and allergies, were reviewed and updated with the patient. Patient has been going [facility] therapy. States she hasn't noticed much difference yet. She isn't sure if she is doing the exercises right. Sinus Problem   This is a new problem. The current episode started more than 1 month ago (x 1 month or so). The problem is unchanged. There has been no fever. She is experiencing no pain. Associated symptoms include congestion and coughing. Pertinent negatives include no chills, ear pain, headaches, shortness of breath, sinus pressure, sore throat or swollen glands. Treatment Adherence:   Medication compliance:  compliant all of the time  Diet compliance:  compliant most of the time  Weight trend: stable  Current exercise: no regular exercise    Hypertension:  Home blood pressure monitoring: No. Patient denies chest pain, shortness of breath, headache, peripheral edema and palpitations. Antihypertensive medication side effects: no medication side effects noted. Use of agents associated with hypertension: none. Sodium (mmol/L)   Date Value   2019 143    BUN (mg/dL)   Date Value   2019 24 (H)    Glucose (mg/dL)   Date Value   2018 127 (H)      Potassium (mmol/L)   Date Value   2019 4.1    CREATININE (mg/dL)   Date Value   2019 0.9         Hyperlipidemia:  No new myalgias or GI upset on simvastatin (Zocor).      Lab Results   Component Value Date    CHOL 147 2018    TRIG 190 (H) 2018    HDL 35 (L) 2019    LDLCALC 58 2019     Lab Results Component Value Date    ALT 25 04/29/2019    AST 24 04/29/2019          Review of Systems   Constitutional: Negative for chills. HENT: Positive for congestion, postnasal drip and rhinorrhea. Negative for ear pain, sinus pressure and sore throat. Respiratory: Positive for cough and chest tightness. Negative for shortness of breath and wheezing. Cardiovascular: Negative for chest pain. Gastrointestinal: Negative for abdominal pain. Neurological: Negative for headaches. Prior to Visit Medications    Medication Sig Taking? Authorizing Provider   albuterol sulfate HFA (VENTOLIN HFA) 108 (90 Base) MCG/ACT inhaler Inhale 1-2 puffs into the lungs every 4 hours as needed for Wheezing or Shortness of Breath Yes Antony Jewell MD   loratadine (CLARITIN) 10 MG tablet Take 1 tablet by mouth daily Yes Antony Jewell MD   oxyCODONE-acetaminophen (PERCOCET) 5-325 MG per tablet Take 1 tablet by mouth 3 times daily for 30 days.  Yes JUSTIN Saldivar - CNP   simvastatin (ZOCOR) 40 MG tablet TAKE 1 TABLET NIGHTLY Yes Antony Jewell MD   fluticasone (FLONASE) 50 MCG/ACT nasal spray 2 sprays by Nasal route daily Yes Antony Jewell MD   metoprolol tartrate (LOPRESSOR) 25 MG tablet TAKE 1 TABLET TWICE A DAY Yes Antony Jewell MD   lisinopril-hydrochlorothiazide (PRINZIDE;ZESTORETIC) 20-25 MG per tablet Take 1 tablet by mouth daily Yes Antony Jewell MD   Solifenacin Succinate (VESICARE PO) Take by mouth daily Yes Historical Provider, MD   albuterol sulfate HFA (PROVENTIL HFA) 108 (90 Base) MCG/ACT inhaler Inhale 1 puff into the lungs every 4 hours as needed for Wheezing Yes Antony Jewell MD   Spacer/Aero-Holding Chambers VERA 1 Device by Does not apply route daily as needed (inhaler use) Yes Antony Jewell MD   zoster recombinant adjuvanted vaccine (SHINGRIX) 50 MCG SUSR injection Inject 0.5 mLs into the muscle every 6 months Yes Antony Jewell MD   Blood Pressure KIT Check blood pressures and bring log to next visit Yes Vikki Greene, DO   nystatin (MYCOSTATIN) 099754 UNIT/GM powder Apply 3 times daily. Yes Radha Woodruff MD   CALCIUM PO Take by mouth Yes Historical Provider, MD   aspirin 81 MG tablet Take 81 mg by mouth daily Yes Historical Provider, MD   Biotin 1 MG CAPS Take 1 capsule by mouth daily Yes Historical Provider, MD   guaiFENesin (MUCINEX) 600 MG SR tablet Take 1,200 mg by mouth nightly Yes Historical Provider, MD   Calcium Carbonate-Vitamin D (CALCIUM + D PO) Take 1,200 mg by mouth daily  Yes Historical Provider, MD   Multiple Vitamins-Minerals (MULTI FOR HER PO) Take  by mouth. Yes Historical Provider, MD        Social History     Tobacco Use    Smoking status: Never Smoker    Smokeless tobacco: Never Used   Substance Use Topics    Alcohol use: No        Vitals:    06/24/19 0918   BP: 132/68   Site: Left Upper Arm   Position: Sitting   Cuff Size: Large Adult   Pulse: 68   SpO2: 98%   Weight: 191 lb 9.6 oz (86.9 kg)   Height: 5' 5\" (1.651 m)     Estimated body mass index is 31.88 kg/m² as calculated from the following:    Height as of this encounter: 5' 5\" (1.651 m). Weight as of this encounter: 191 lb 9.6 oz (86.9 kg). Physical Exam   Constitutional: She appears well-developed and well-nourished. She is active. No distress. HENT:   Head: Normocephalic and atraumatic. Right Ear: Hearing, tympanic membrane, external ear and ear canal normal. No drainage or tenderness. Left Ear: Hearing, tympanic membrane, external ear and ear canal normal. No drainage or tenderness. Nose: Nose normal. No mucosal edema or rhinorrhea. Right sinus exhibits no maxillary sinus tenderness and no frontal sinus tenderness. Left sinus exhibits no maxillary sinus tenderness and no frontal sinus tenderness. Mouth/Throat: Uvula is midline and oropharynx is clear and moist. No oropharyngeal exudate or posterior oropharyngeal erythema. Eyes: Pupils are equal, round, and reactive to light.  Conjunctivae and EOM are normal.

## 2019-06-25 ASSESSMENT — ENCOUNTER SYMPTOMS: SINUS COMPLAINT: 1

## 2019-06-27 ASSESSMENT — ENCOUNTER SYMPTOMS
COUGH: 1
RHINORRHEA: 1
SHORTNESS OF BREATH: 0
SINUS PRESSURE: 0
CHEST TIGHTNESS: 1
SORE THROAT: 0
SWOLLEN GLANDS: 0
WHEEZING: 0
ABDOMINAL PAIN: 0

## 2019-07-08 ENCOUNTER — HOSPITAL ENCOUNTER (OUTPATIENT)
Dept: PHYSICAL THERAPY | Age: 84
Setting detail: THERAPIES SERIES
Discharge: HOME | End: 2019-07-08
Payer: MEDICARE

## 2019-07-08 PROCEDURE — 97112 NEUROMUSCULAR REEDUCATION: CPT

## 2019-07-08 PROCEDURE — 97110 THERAPEUTIC EXERCISES: CPT

## 2019-07-15 ENCOUNTER — HOSPITAL ENCOUNTER (OUTPATIENT)
Dept: PHYSICAL THERAPY | Age: 84
Setting detail: THERAPIES SERIES
Discharge: HOME | End: 2019-07-15
Payer: MEDICARE

## 2019-07-15 PROCEDURE — 97530 THERAPEUTIC ACTIVITIES: CPT

## 2019-07-15 PROCEDURE — 97110 THERAPEUTIC EXERCISES: CPT

## 2019-07-24 ENCOUNTER — HOSPITAL ENCOUNTER (OUTPATIENT)
Dept: PHYSICAL THERAPY | Age: 84
Setting detail: THERAPIES SERIES
Discharge: HOME | End: 2019-07-24
Payer: MEDICARE

## 2019-07-24 ENCOUNTER — TELEPHONE (OUTPATIENT)
Dept: FAMILY MEDICINE CLINIC | Age: 84
End: 2019-07-24

## 2019-07-24 PROCEDURE — 97112 NEUROMUSCULAR REEDUCATION: CPT

## 2019-07-24 PROCEDURE — 97530 THERAPEUTIC ACTIVITIES: CPT

## 2019-07-24 PROCEDURE — 97110 THERAPEUTIC EXERCISES: CPT

## 2019-07-24 RX ORDER — MONTELUKAST SODIUM 10 MG/1
10 TABLET ORAL DAILY
Qty: 30 TABLET | Refills: 3 | Status: SHIPPED | OUTPATIENT
Start: 2019-07-24 | End: 2019-08-19 | Stop reason: SDUPTHER

## 2019-07-24 NOTE — FLOWSHEET NOTE
6. 4mHz)      Long-Hold: ~6mHz x10sec. (was ~10mHz x10 seconds, was [id number]) (was 2 seconds)        Modalities: --      Test/Measurements:  See eval       ASSESSMENT:  The patient performed above TE well with no increase in pain. The patient continues to struggle with decreased ability to contract pelvic floor. The patient did do well with biofeedback, though continues to struggle with recruitment. Encouraged patient to increase compliance with HEP. The patient verbalized good understanding of all education provided. The patient will make recommended changes and follow-up in 1 week. Patient may benefit from follow-up with MD for medication treatment of symptoms. Treatment/Activity Tolerance:   [x] Patient tolerated treatment well [] Patient limited by fatique  [] Patient limited by pain [] Patient limited by other medical complications  [] Other:     Goals:    Time Frame: 12 weeks. Rehab Potential: fair     Goals:  1. Patient will demonstrate independence with HEP to self-manage symptoms. 2. Patient will improve PF strength to at least 3/5 demonstrating improved control and strength of pelvic floor musculature. 3. Patient will consistently report 25% reduction in leakage frequency demonstrating improved pelvic floor control and quality of life. 4. Patient will demonstrate normal pelvic floor coordination and perineal body mobility to improve pelvic floor control. 5. Patient will improve PFDI score by at least 25 points demonstrating improved pelvic floor control and improved quality of life.       Plan: [x] Continue per plan of care [] Alter current plan (see comments)   [] Plan of care initiated [] Hold pending MD visit [] Discharge      Plan for Next Session: Biofeedback, progress PF and TA strengthening.     Re-Certification Due Date:  Visit 8     Signature:  Trini Aguilar, PT, DPT

## 2019-07-29 ENCOUNTER — HOSPITAL ENCOUNTER (OUTPATIENT)
Dept: PHYSICAL THERAPY | Age: 84
Setting detail: THERAPIES SERIES
Discharge: HOME | End: 2019-07-29
Payer: MEDICARE

## 2019-07-29 PROCEDURE — 97110 THERAPEUTIC EXERCISES: CPT

## 2019-07-29 PROCEDURE — 97140 MANUAL THERAPY 1/> REGIONS: CPT

## 2019-07-29 PROCEDURE — 97530 THERAPEUTIC ACTIVITIES: CPT

## 2019-07-29 NOTE — FLOWSHEET NOTE
Physical Therapy Daily Treatment Note and Discharge Summary    Date:  2019    Patient Name:  Gregor Mccann    :  1934  MRN: [id number]  Restrictions/Precautions:  universal  Medical/Treatment Diagnosis Information: Diagnosis: R32 Urinary Incontinence   Insurance/Certification information:   [employer]  Physician Information:   Tiffanie Allen MD  Plan of care signed (Y/N):  Y  Visit# / total visits:  9  (19 to 19)    G-Code (if applicable):           PFDI score: 91.7/300 (was 93.75/300)    Medicare Cap (if applicable):  n/a = total amount used, updated 2019    Time in:   10:00am      Timed Treatment: 55min Total Treatment Time:  55min.  ________________________________________________________________________________________    Pain Level:    0/10  SUBJECTIVE:  Reports \"I think today is my last day\". \"I am having some decrease in my leaks, but still having some\". States she feels that she has made some improvements but not much since starting therapy. Reports compliance with HEP. OBJECTIVE:     Exercise (TE) Resistance/Repetitions Other comments            PF strengthening  HEP     (Sitting on washcloth) Short hold: (1sec) 10 times       Long hold: (5sec) 10 times            Bridge w/ PF x10    Hook-lying TA w/ PF     PF squat   x10    PF relaxation                SciFit     x10min Level 3.6           TE x20min    Other Treatment:    TA:  Bladder re-training/education: 25min    Bladder Diary: 4-6 day voids, 2 (was 3) night voids, 0-2 leakages per day, 1-2 changes of pads per day    Voiding: educated to void every 2-3 hours    Bladder re-trainin-6 quick contractions during times of transition or strong urge. Dietary: patient educated on the \"4Cs\" to reduce bladder irritation and leakage, 32oz (was 0-12 oz) H2O per day.      Other: 2 hours before bed, raise LEs, use of washcloth for feedback, Educator, pelvic floor contraction during transitioning and correct ways to transfer in/out of bed/car. Manual Treatments:  neuromuscular re-ed with manual tactile cues x10min    Modalities: --      Test/Measurements:    PERFECT: (no improvement since evaluation)              Power: 2/5              Endurance: 10sec. Repetitions: 4              Fast Twitch: 3              Elevation: not present              Co-contraction: present              Timing: not present     Pelvic Organ Prolapse: patient unable to voluntarily bear down, according to urology note; Grade II rectocele           ASSESSMENT:  The patient performed above TE well with no increase in pain. After 9 PT visits the patient has not made much progress. The patient continues with difficulty recruiting pelvic floor, decreased pelvic floor strength and endurance. The patient is also experiencing leakage throughout the day. The patient was advised to continue to monitor for trigger foods and to continue to pelvic floor exercises. The patient may benefit from medication and/or other non-conservative treatment options. Advised to follow-up with MD.           Treatment/Activity Tolerance:   [x] Patient tolerated treatment well [] Patient limited by fatique  [] Patient limited by pain [] Patient limited by other medical complications  [] Other:     Goals:    Time Frame: 12 weeks. Rehab Potential: fair     Goals:  1. Patient will demonstrate independence with HEP to self-manage symptoms. (MET)  2. Patient will improve PF strength to at least 3/5 demonstrating improved control and strength of pelvic floor musculature. (NOT MET)  3. Patient will consistently report 25% reduction in leakage frequency demonstrating improved pelvic floor control and quality of life.  (MET)  4. Patient will demonstrate normal pelvic floor coordination and perineal body mobility to improve pelvic floor control. (NOT MET)  5.  Patient will improve PFDI score by at least 25 points demonstrating improved pelvic floor control and improved quality of life.  (NOT MET)       Plan: [] Continue per plan of care [] Alter current plan (see comments)   [] Plan of care initiated [] Hold pending MD visit [x] Discharge       Signature:  Sylwia Lacy, PT, DPT

## 2019-08-19 RX ORDER — MONTELUKAST SODIUM 10 MG/1
10 TABLET ORAL DAILY
Qty: 90 TABLET | Refills: 0 | Status: SHIPPED | OUTPATIENT
Start: 2019-08-19 | End: 2019-11-06 | Stop reason: SDUPTHER

## 2019-11-08 RX ORDER — MONTELUKAST SODIUM 10 MG/1
TABLET ORAL
Qty: 90 TABLET | Refills: 1 | Status: SHIPPED | OUTPATIENT
Start: 2019-11-08 | End: 2020-06-19 | Stop reason: ALTCHOICE

## 2019-11-19 ENCOUNTER — HOSPITAL ENCOUNTER (EMERGENCY)
Age: 84
Discharge: HOME | End: 2019-11-19
Payer: MEDICARE

## 2019-11-19 VITALS
RESPIRATION RATE: 16 BRPM | BODY MASS INDEX: 31.62 KG/M2 | TEMPERATURE: 98.1 F | HEART RATE: 81 BPM | DIASTOLIC BLOOD PRESSURE: 67 MMHG | WEIGHT: 190 LBS | SYSTOLIC BLOOD PRESSURE: 140 MMHG | OXYGEN SATURATION: 98 %

## 2019-11-19 DIAGNOSIS — K59.1 FUNCTIONAL DIARRHEA: Primary | ICD-10-CM

## 2019-11-19 DIAGNOSIS — R19.5 OCCULT BLOOD POSITIVE STOOL: ICD-10-CM

## 2019-11-19 LAB
A/G RATIO: 1.4 (ref 1.1–2.2)
ALBUMIN SERPL-MCNC: 4.6 G/DL (ref 3.4–5)
ALP BLD-CCNC: 73 U/L (ref 40–129)
ALT SERPL-CCNC: 28 U/L (ref 10–40)
ANION GAP SERPL CALCULATED.3IONS-SCNC: 14 MMOL/L (ref 3–16)
AST SERPL-CCNC: 53 U/L (ref 15–37)
BASOPHILS ABSOLUTE: 0.1 K/UL (ref 0–0.2)
BASOPHILS RELATIVE PERCENT: 0.5 %
BILIRUB SERPL-MCNC: 0.5 MG/DL (ref 0–1)
BUN BLDV-MCNC: 30 MG/DL (ref 7–20)
C DIFF TOXIN/ANTIGEN: NORMAL
CALCIUM SERPL-MCNC: 10.5 MG/DL (ref 8.3–10.6)
CHLORIDE BLD-SCNC: 98 MMOL/L (ref 99–110)
CO2: 23 MMOL/L (ref 21–32)
CREAT SERPL-MCNC: 0.8 MG/DL (ref 0.6–1.2)
CRYPTOSPORIDIUM ANTIGEN STOOL: NORMAL
E HISTOLYTICA ANTIGEN STOOL: NORMAL
EOSINOPHILS ABSOLUTE: 0.3 K/UL (ref 0–0.6)
EOSINOPHILS RELATIVE PERCENT: 2.5 %
GFR AFRICAN AMERICAN: >60
GFR NON-AFRICAN AMERICAN: >60
GIARDIA ANTIGEN STOOL: NORMAL
GLOBULIN: 3.3 G/DL
GLUCOSE BLD-MCNC: 151 MG/DL (ref 70–99)
HCT VFR BLD CALC: 36.4 % (ref 36–48)
HEMOGLOBIN: 12.4 G/DL (ref 12–16)
LYMPHOCYTES ABSOLUTE: 1.7 K/UL (ref 1–5.1)
LYMPHOCYTES RELATIVE PERCENT: 13.7 %
MCH RBC QN AUTO: 30.4 PG (ref 26–34)
MCHC RBC AUTO-ENTMCNC: 34 G/DL (ref 31–36)
MCV RBC AUTO: 89.6 FL (ref 80–100)
MONOCYTES ABSOLUTE: 0.8 K/UL (ref 0–1.3)
MONOCYTES RELATIVE PERCENT: 6.1 %
NEUTROPHILS ABSOLUTE: 9.8 K/UL (ref 1.7–7.7)
NEUTROPHILS RELATIVE PERCENT: 77.2 %
OCCULT BLOOD SCREENING: ABNORMAL
PDW BLD-RTO: 14.2 % (ref 12.4–15.4)
PLATELET # BLD: 262 K/UL (ref 135–450)
PMV BLD AUTO: 8.3 FL (ref 5–10.5)
POTASSIUM SERPL-SCNC: 5 MMOL/L (ref 3.5–5.1)
RBC # BLD: 4.06 M/UL (ref 4–5.2)
SODIUM BLD-SCNC: 135 MMOL/L (ref 136–145)
TOTAL PROTEIN: 7.9 G/DL (ref 6.4–8.2)
WBC # BLD: 12.7 K/UL (ref 4–11)

## 2019-11-19 PROCEDURE — 96361 HYDRATE IV INFUSION ADD-ON: CPT

## 2019-11-19 PROCEDURE — 80053 COMPREHEN METABOLIC PANEL: CPT

## 2019-11-19 PROCEDURE — 87449 NOS EACH ORGANISM AG IA: CPT

## 2019-11-19 PROCEDURE — 87324 CLOSTRIDIUM AG IA: CPT

## 2019-11-19 PROCEDURE — 6370000000 HC RX 637 (ALT 250 FOR IP): Performed by: NURSE PRACTITIONER

## 2019-11-19 PROCEDURE — 2580000003 HC RX 258: Performed by: NURSE PRACTITIONER

## 2019-11-19 PROCEDURE — 87329 GIARDIA AG IA: CPT

## 2019-11-19 PROCEDURE — 85025 COMPLETE CBC W/AUTO DIFF WBC: CPT

## 2019-11-19 PROCEDURE — 87328 CRYPTOSPORIDIUM AG IA: CPT

## 2019-11-19 PROCEDURE — 82705 FATS/LIPIDS FECES QUAL: CPT

## 2019-11-19 PROCEDURE — G0328 FECAL BLOOD SCRN IMMUNOASSAY: HCPCS

## 2019-11-19 PROCEDURE — 96360 HYDRATION IV INFUSION INIT: CPT

## 2019-11-19 PROCEDURE — 87336 ENTAMOEB HIST DISPR AG IA: CPT

## 2019-11-19 PROCEDURE — 99284 EMERGENCY DEPT VISIT MOD MDM: CPT

## 2019-11-19 RX ORDER — LOPERAMIDE HYDROCHLORIDE 2 MG/1
2 CAPSULE ORAL ONCE
Status: COMPLETED | OUTPATIENT
Start: 2019-11-19 | End: 2019-11-19

## 2019-11-19 RX ORDER — LOPERAMIDE HYDROCHLORIDE 2 MG/1
2 CAPSULE ORAL 4 TIMES DAILY PRN
Qty: 6 CAPSULE | Refills: 0 | Status: SHIPPED | OUTPATIENT
Start: 2019-11-19 | End: 2019-11-29

## 2019-11-19 RX ORDER — 0.9 % SODIUM CHLORIDE 0.9 %
1000 INTRAVENOUS SOLUTION INTRAVENOUS ONCE
Status: COMPLETED | OUTPATIENT
Start: 2019-11-19 | End: 2019-11-19

## 2019-11-19 RX ADMIN — LOPERAMIDE HYDROCHLORIDE 2 MG: 2 CAPSULE ORAL at 03:24

## 2019-11-19 RX ADMIN — SODIUM CHLORIDE 1000 ML: 9 INJECTION, SOLUTION INTRAVENOUS at 01:14

## 2019-11-19 ASSESSMENT — ENCOUNTER SYMPTOMS
DIARRHEA: 1
NAUSEA: 0
COUGH: 0
VOMITING: 0
BACK PAIN: 0
ABDOMINAL PAIN: 0
COLOR CHANGE: 0
SHORTNESS OF BREATH: 0

## 2019-11-20 ENCOUNTER — OFFICE VISIT (OUTPATIENT)
Dept: FAMILY MEDICINE CLINIC | Age: 84
End: 2019-11-20
Payer: MEDICARE

## 2019-11-20 VITALS
BODY MASS INDEX: 31.62 KG/M2 | SYSTOLIC BLOOD PRESSURE: 120 MMHG | DIASTOLIC BLOOD PRESSURE: 60 MMHG | OXYGEN SATURATION: 97 % | HEART RATE: 69 BPM | WEIGHT: 190 LBS

## 2019-11-20 DIAGNOSIS — K59.1 FUNCTIONAL DIARRHEA: Primary | ICD-10-CM

## 2019-11-20 DIAGNOSIS — D72.829 LEUKOCYTOSIS, UNSPECIFIED TYPE: ICD-10-CM

## 2019-11-20 DIAGNOSIS — R19.5 POSITIVE OCCULT STOOL BLOOD TEST: ICD-10-CM

## 2019-11-20 LAB
BILIRUBIN, POC: ABNORMAL
BLOOD URINE, POC: ABNORMAL
CLARITY, POC: CLEAR
COLOR, POC: YELLOW
GLUCOSE URINE, POC: ABNORMAL
KETONES, POC: ABNORMAL
LEUKOCYTE EST, POC: ABNORMAL
NITRITE, POC: ABNORMAL
PH, POC: 5
PROTEIN, POC: ABNORMAL
SPECIFIC GRAVITY, POC: 1.01
UROBILINOGEN, POC: 0.2

## 2019-11-20 PROCEDURE — 1036F TOBACCO NON-USER: CPT | Performed by: PHYSICIAN ASSISTANT

## 2019-11-20 PROCEDURE — 1090F PRES/ABSN URINE INCON ASSESS: CPT | Performed by: PHYSICIAN ASSISTANT

## 2019-11-20 PROCEDURE — 1123F ACP DISCUSS/DSCN MKR DOCD: CPT | Performed by: PHYSICIAN ASSISTANT

## 2019-11-20 PROCEDURE — G8417 CALC BMI ABV UP PARAM F/U: HCPCS | Performed by: PHYSICIAN ASSISTANT

## 2019-11-20 PROCEDURE — 99214 OFFICE O/P EST MOD 30 MIN: CPT | Performed by: PHYSICIAN ASSISTANT

## 2019-11-20 PROCEDURE — G8484 FLU IMMUNIZE NO ADMIN: HCPCS | Performed by: PHYSICIAN ASSISTANT

## 2019-11-20 PROCEDURE — 4040F PNEUMOC VAC/ADMIN/RCVD: CPT | Performed by: PHYSICIAN ASSISTANT

## 2019-11-20 PROCEDURE — G8427 DOCREV CUR MEDS BY ELIG CLIN: HCPCS | Performed by: PHYSICIAN ASSISTANT

## 2019-11-20 PROCEDURE — 81002 URINALYSIS NONAUTO W/O SCOPE: CPT | Performed by: PHYSICIAN ASSISTANT

## 2019-11-20 PROCEDURE — G8400 PT W/DXA NO RESULTS DOC: HCPCS | Performed by: PHYSICIAN ASSISTANT

## 2019-11-20 RX ORDER — OXYBUTYNIN CHLORIDE 10 MG/1
10 TABLET, EXTENDED RELEASE ORAL DAILY
COMMUNITY
End: 2021-04-08

## 2019-11-20 ASSESSMENT — ENCOUNTER SYMPTOMS
ABDOMINAL DISTENTION: 0
BLOOD IN STOOL: 1
SORE THROAT: 0
VOMITING: 0
DIARRHEA: 0
NAUSEA: 0
ABDOMINAL PAIN: 0
CONSTIPATION: 0

## 2019-11-21 LAB
FECAL NEUTRAL FAT: NORMAL
FECAL SPLIT FATS: NORMAL

## 2019-11-23 LAB
ORGANISM: ABNORMAL
URINE CULTURE, ROUTINE: ABNORMAL

## 2019-11-25 DIAGNOSIS — N30.00 ACUTE CYSTITIS WITHOUT HEMATURIA: Primary | ICD-10-CM

## 2019-11-25 RX ORDER — SULFAMETHOXAZOLE AND TRIMETHOPRIM 800; 160 MG/1; MG/1
1 TABLET ORAL 2 TIMES DAILY
Qty: 14 TABLET | Refills: 0 | Status: SHIPPED | OUTPATIENT
Start: 2019-11-25 | End: 2019-12-02

## 2019-12-16 ENCOUNTER — OFFICE VISIT (OUTPATIENT)
Dept: FAMILY MEDICINE CLINIC | Age: 84
End: 2019-12-16
Payer: MEDICARE

## 2019-12-16 VITALS
OXYGEN SATURATION: 97 % | HEIGHT: 65 IN | WEIGHT: 192.6 LBS | DIASTOLIC BLOOD PRESSURE: 74 MMHG | SYSTOLIC BLOOD PRESSURE: 136 MMHG | HEART RATE: 68 BPM | BODY MASS INDEX: 32.09 KG/M2

## 2019-12-16 DIAGNOSIS — I10 ESSENTIAL HYPERTENSION: ICD-10-CM

## 2019-12-16 DIAGNOSIS — J32.9 CHRONIC CONGESTION OF PARANASAL SINUS: Primary | ICD-10-CM

## 2019-12-16 DIAGNOSIS — N32.81 OAB (OVERACTIVE BLADDER): ICD-10-CM

## 2019-12-16 DIAGNOSIS — K92.1 BLOOD IN STOOL: ICD-10-CM

## 2019-12-16 DIAGNOSIS — E78.5 HYPERLIPIDEMIA, UNSPECIFIED HYPERLIPIDEMIA TYPE: ICD-10-CM

## 2019-12-16 PROCEDURE — G8427 DOCREV CUR MEDS BY ELIG CLIN: HCPCS | Performed by: FAMILY MEDICINE

## 2019-12-16 PROCEDURE — 4040F PNEUMOC VAC/ADMIN/RCVD: CPT | Performed by: FAMILY MEDICINE

## 2019-12-16 PROCEDURE — G8400 PT W/DXA NO RESULTS DOC: HCPCS | Performed by: FAMILY MEDICINE

## 2019-12-16 PROCEDURE — G8482 FLU IMMUNIZE ORDER/ADMIN: HCPCS | Performed by: FAMILY MEDICINE

## 2019-12-16 PROCEDURE — 99214 OFFICE O/P EST MOD 30 MIN: CPT | Performed by: FAMILY MEDICINE

## 2019-12-16 PROCEDURE — G8417 CALC BMI ABV UP PARAM F/U: HCPCS | Performed by: FAMILY MEDICINE

## 2019-12-16 PROCEDURE — 1036F TOBACCO NON-USER: CPT | Performed by: FAMILY MEDICINE

## 2019-12-16 PROCEDURE — 1123F ACP DISCUSS/DSCN MKR DOCD: CPT | Performed by: FAMILY MEDICINE

## 2019-12-16 PROCEDURE — 1090F PRES/ABSN URINE INCON ASSESS: CPT | Performed by: FAMILY MEDICINE

## 2019-12-16 ASSESSMENT — ENCOUNTER SYMPTOMS
BLOOD IN STOOL: 1
COUGH: 0
DIARRHEA: 1
SINUS COMPLAINT: 1
SORE THROAT: 0
SINUS PRESSURE: 1
SINUS PAIN: 0

## 2019-12-18 LAB
2000687N OAK TREE IGE: <0.1 KU/L
ALLERGEN ASPERGILLUS FUMIGATUS IGE: 0.18 KU/L
ALLERGEN BERMUDA GRASS IGE: <0.1 KU/L
ALLERGEN BIRCH IGE: <0.1 KU/L
ALLERGEN CAT DANDER IGE: <0.1 KU/L
ALLERGEN COMMON SHORT RAGWEED IGE: <0.1 KU/L
ALLERGEN COTTONWOOD: <0.1 KU/L
ALLERGEN DOG DANDER IGE: <0.1 KU/L
ALLERGEN ELM IGE: <0.1 KU/L
ALLERGEN FUNGI/MOLD M.RACEMOSUS IGE: 0.22 KU/L
ALLERGEN GERMAN COCKROACH IGE: 0.25 KU/L
ALLERGEN HORMODENDRUM HORDEI IGE: 0.14 KU/L
ALLERGEN MAPLE/BOX ELDER IGE: <0.1 KU/L
ALLERGEN MITE DUST FARINAE IGE: 0.38 KU/L
ALLERGEN MITE DUST PTERONYSSINUS IGE: 0.51 KU/L
ALLERGEN MOUNTAIN CEDAR: <0.1 KU/L
ALLERGEN MOUSE EPITHELIA IGE: <0.1 KU/L
ALLERGEN PECAN TREE IGE: <0.1 KU/L
ALLERGEN PENICILLIUM NOTATUM: 0.18 KU/L
ALLERGEN ROUGH PIGWEED (W14) IGE: <0.1 KU/L
ALLERGEN RUSSIAN THISTLE IGE: <0.1 KU/L
ALLERGEN SEE NOTE: ABNORMAL
ALLERGEN SHEEP SORREL (W18) IGE: <0.1 KU/L
ALLERGEN TIMOTHY GRASS: <0.1 KU/L
ALLERGEN TREE SYCAMORE: <0.1 KU/L
ALLERGEN WALNUT TREE IGE: <0.1 KU/L
ALLERGEN WHITE MULBERRY TREE, IGE: <0.1 KU/L
ALLERGEN, TREE, WHITE ASH IGE: <0.1 KU/L
IGE: 338 KU/L

## 2019-12-19 DIAGNOSIS — K92.1 BLOOD IN STOOL: ICD-10-CM

## 2019-12-19 LAB
CONTROL: ABNORMAL
HEMOCCULT STL QL: ABNORMAL

## 2019-12-19 PROCEDURE — 82274 ASSAY TEST FOR BLOOD FECAL: CPT | Performed by: FAMILY MEDICINE

## 2019-12-23 ENCOUNTER — HOSPITAL ENCOUNTER (OUTPATIENT)
Dept: CT IMAGING | Age: 84
Discharge: HOME | End: 2019-12-23
Payer: MEDICARE

## 2019-12-23 DIAGNOSIS — J32.9 CHRONIC CONGESTION OF PARANASAL SINUS: ICD-10-CM

## 2019-12-23 PROCEDURE — 70486 CT MAXILLOFACIAL W/O DYE: CPT

## 2019-12-24 DIAGNOSIS — J34.2 DEVIATED SEPTUM: ICD-10-CM

## 2019-12-24 DIAGNOSIS — R19.5 POSITIVE FIT (FECAL IMMUNOCHEMICAL TEST): Primary | ICD-10-CM

## 2019-12-24 DIAGNOSIS — R09.81 CHRONIC NASAL CONGESTION: Primary | ICD-10-CM

## 2020-01-07 ENCOUNTER — INITIAL CONSULT (OUTPATIENT)
Dept: GASTROENTEROLOGY | Age: 85
End: 2020-01-07
Payer: MEDICARE

## 2020-01-07 VITALS
BODY MASS INDEX: 31.99 KG/M2 | WEIGHT: 192 LBS | SYSTOLIC BLOOD PRESSURE: 138 MMHG | DIASTOLIC BLOOD PRESSURE: 72 MMHG | HEIGHT: 65 IN

## 2020-01-07 PROBLEM — R19.5 OCCULT BLOOD IN STOOLS: Status: ACTIVE | Noted: 2020-01-07

## 2020-01-07 PROCEDURE — 99203 OFFICE O/P NEW LOW 30 MIN: CPT | Performed by: INTERNAL MEDICINE

## 2020-01-07 RX ORDER — POLYETHYLENE GLYCOL 3350 17 G/17G
238 POWDER ORAL ONCE
Qty: 238 G | Refills: 0 | Status: SHIPPED | OUTPATIENT
Start: 2020-01-07 | End: 2020-01-07 | Stop reason: ALTCHOICE

## 2020-01-07 NOTE — PROGRESS NOTES
[address]  [address] [facility]  Phone: [phone]   OJP:[phone]    CHIEF COMPLAINT     Chief Complaint   Patient presents with    New Patient     Positive FIT r/b Dr. Suzan Morales         MYKE Barone is a 80 y.o. female who presents for positive occult blood in stools by FIT. Extensive past history as below, history of degenerative joint disease, chronic pain on Percocet. Positive occult blood in stools 12/19/19. She denies hematochezia or melena. She denies abdominal pain. Has mild chronic constipation due to Percocet and has to take Miralax daily to have a bm, if she does not use Miralax then she will not have a bm for couple of days. She feels her last colonoscopy was 15 years or more ago. She was having some diarrhea back in November but this has resolved and now back to her mild chronic constipation. PAST MEDICAL HISTORY     Past Medical History:   Diagnosis Date    Allergic rhinitis     Arthritis     Bladder dysfunction     overactive    Blood transfusion     after surgery 2008, 2012    Cancer [facility])     skin cancer above rt eye excised    Cervical stenosis of spine     Chronic constipation     Constipation, other cause     due to pain medication    GERD (gastroesophageal reflux disease)     controlled w/diet    Hyperlipidemia     Hypertension     Incontinence of urine     Lumbar stenosis with neurogenic claudication     SCC (squamous cell carcinoma)     right side of the face    Sinus congestion     Skin abnormality 8/2012    skin peeling right medial foot/heel improving with OTC antifungal cream    Sleep apnea     cpap does not use    Wears dentures     partial     FAMILY HISTORY     Family History   Problem Relation Age of Onset    Hypertension Mother      SOCIAL HISTORY     Social History     Socioeconomic History    Marital status:       Spouse name: Not on file    Number of children: Not on file    Years of education: Not on file    Highest education level: Not on file   Occupational History    Not on file   Social Needs    Financial resource strain: Not on file    Food insecurity:     Worry: Not on file     Inability: Not on file    Transportation needs:     Medical: Not on file     Non-medical: Not on file   Tobacco Use    Smoking status: Never Smoker    Smokeless tobacco: Never Used   Substance and Sexual Activity    Alcohol use: No    Drug use: No    Sexual activity: Not Currently   Lifestyle    Physical activity:     Days per week: Not on file     Minutes per session: Not on file    Stress: Not on file   Relationships    Social connections:     Talks on phone: Not on file     Gets together: Not on file     Attends [employer] service: Not on file     Active member of club or organization: Not on file     Attends meetings of clubs or organizations: Not on file     Relationship status: Not on file    Intimate partner violence:     Fear of current or ex partner: Not on file     Emotionally abused: Not on file     Physically abused: Not on file     Forced sexual activity: Not on file   Other Topics Concern    Not on file   Social History Narrative    Not on file     SURGICAL HISTORY     Past Surgical History:   Procedure Laterality Date    APPENDECTOMY      BACK SURGERY      cervical discectomy [id number]   [facility] BACK SURGERY  [id number]    lumbar laminectomy L3-L5 with pedicle screws    CARPAL TUNNEL RELEASE      rt hand 1990    CERVICAL DISCECTOMY  2/15/12    C4-5 complete and radical discectomy and bilateral foraminotomy and take down of posterior longitudinal ligament    CHOLECYSTECTOMY      COLONOSCOPY      CYSTOSCOPY N/A 01/04/2019    RIGID CYSTOSCOPY     CYSTOSCOPY N/A 1/4/2019    RIGID CYSTOSCOPY performed by Cathryn Nicholas MD at [facility], COLON, DIAGNOSTIC      FOOT SURGERY      repair torn tendon with platelet infusion rt foot 2010    HYSTERECTOMY      partial 1976    JOINT aspirin 81 MG tablet Take 81 mg by mouth daily      Biotin 1 MG CAPS Take 1 capsule by mouth daily      guaiFENesin (MUCINEX) 600 MG SR tablet Take 1,200 mg by mouth nightly      Calcium Carbonate-Vitamin D (CALCIUM + D PO) Take 1,200 mg by mouth daily       Multiple Vitamins-Minerals (MULTI FOR HER PO) Take  by mouth. ALLERGIES     Allergies   Allergen Reactions    Cipro Xr Hives    Clindamycin/Lincomycin Hives     IMMUNIZATIONS     Immunization History   Administered Date(s) Administered    DT (pediatric) 01/01/2008    Influenza 11/07/2016    Influenza Vaccine, unspecified formulation 10/22/2015, 10/01/2016, 11/07/2016    Influenza Virus Vaccine 11/07/2016    Influenza, High Dose (Fluzone 65 yrs and older) 10/22/2015, 10/01/2016, 10/05/2017, 10/08/2018, 10/10/2019    Pneumococcal Conjugate 13-valent (Shorty Louder) 02/17/2016, 10/01/2016    Pneumococcal Polysaccharide ([id number]) 01/01/2005, 01/01/2014     REVIEW OF SYSTEMS     Constitutional: denies fever and unexpected weight change. HENT: Negative for ear pain, hearing loss and nosebleeds. Eyes: Negative for pain and visual disturbance. Respiratory: Negative for cough, shortness of breath and wheezing. Cardiovascular: Negative for chest pain, palpitations and leg swelling. Gastrointestinal: see HPI for details. Endocrine: Negative for polydipsia, polyphagia and polyuria. Genitourinary: Negative for difficulty urinating, dysuria, hematuria and urgency. Musculoskeletal: Positive for arthralgias and back pain. Skin: Negative for pallor and rash. Allergic/Immunologic: Negative for environmental allergies and immunocompromised state. Neurological: Negative for seizures, syncope. Hematological: Negative for adenopathy. Does not bruise/bleed easily. Psychiatric/Behavioral: Negative for agitation, confusion, hallucinations.     PHYSICAL EXAM   /72 (Site: Right Upper Arm, Position: Sitting, Cuff Size: Small Adult)

## 2020-01-07 NOTE — LETTER
Dabigatran (Pradaxa), Apixaban (Eliquis), Edoxaban (Savaysa)you should have received instructions regarding if and when to discontinue the medication. If you have not, or do not clearly understand the instructions, please call the office for clarification (number listed above). 5. Drink plenty of fluid. 1 day prior to your procedure:  1. Do not eat any SOLID FOOD, beginning with breakfast drink clear liquids only, which includes: Chicken or Beef Broth, Coffee/tea (without milk or creamer) Gatorade/PowerAde (no red or purple), JeIl-O (no red or purple), All Soda (even dark cola), Sorbet/Popsicles (no red or purple), Water. If you take Diabetic medications (insulin/oral medication)-reduce the amount by one half on the morning of prep. You may resume the meds once you begin eating again. You must drink 8oz of liquids every hour to avoid dehydration. 2. If you take Diabetic medications (insulin/oral medication) - reduce the amount by one half on morning of prep. You may resume the meds once you begin eating again. 3. Bowel Cleansing Prep    * Prepare your Colyte by adding water to the fill line. Powder flavoring, like crystal light (not red or purple), may be added for flavor. Chilling the Colyte solution in the refrigerator and/or drinking the solution through a straw may make it easier to drink. (do not add ice)  ** 5:45 pm Take all 4 dulcolax (bisacodyl) tablets  ** 6:00pm Begin to drink the colyte/golyte solution. Drink one 8oz glass every 15 minutes until you have finished half of the solution (about 8 glasses, or 2 liters). It is ok to take short breaks in between glasses if you experience nausea or fullness. Restart when the symptoms improve. Continue to drink 8oz of liquids an hour until bedtime. Powder flavoring like crystal light may be added for flavor (no red or purple). MORNING OF PROCEDURE  1. ____5:30am__(time) Repeat the 6:00 pm instructions.

## 2020-01-20 ENCOUNTER — ANESTHESIA EVENT (OUTPATIENT)
Dept: ENDOSCOPY | Age: 85
End: 2020-01-20
Payer: MEDICARE

## 2020-01-20 ENCOUNTER — HOSPITAL ENCOUNTER (OUTPATIENT)
Age: 85
Setting detail: OUTPATIENT SURGERY
Discharge: HOME | End: 2020-01-20
Attending: INTERNAL MEDICINE | Admitting: INTERNAL MEDICINE
Payer: MEDICARE

## 2020-01-20 ENCOUNTER — ANESTHESIA (OUTPATIENT)
Dept: ENDOSCOPY | Age: 85
End: 2020-01-20
Payer: MEDICARE

## 2020-01-20 VITALS
BODY MASS INDEX: 30.7 KG/M2 | WEIGHT: 191 LBS | HEIGHT: 66 IN | SYSTOLIC BLOOD PRESSURE: 102 MMHG | HEART RATE: 92 BPM | TEMPERATURE: 96.8 F | RESPIRATION RATE: 14 BRPM | OXYGEN SATURATION: 100 % | DIASTOLIC BLOOD PRESSURE: 61 MMHG

## 2020-01-20 VITALS
SYSTOLIC BLOOD PRESSURE: 91 MMHG | OXYGEN SATURATION: 98 % | DIASTOLIC BLOOD PRESSURE: 48 MMHG | RESPIRATION RATE: 36 BRPM

## 2020-01-20 PROBLEM — K57.30 DIVERTICULOSIS OF COLON: Status: ACTIVE | Noted: 2020-01-20

## 2020-01-20 PROBLEM — K63.5 POLYP OF ASCENDING COLON: Status: ACTIVE | Noted: 2020-01-20

## 2020-01-20 PROCEDURE — 45385 COLONOSCOPY W/LESION REMOVAL: CPT | Performed by: INTERNAL MEDICINE

## 2020-01-20 PROCEDURE — 88305 TISSUE EXAM BY PATHOLOGIST: CPT

## 2020-01-20 PROCEDURE — 3609010600 HC COLONOSCOPY POLYPECTOMY SNARE/COLD BIOPSY: Performed by: INTERNAL MEDICINE

## 2020-01-20 PROCEDURE — 2500000003 HC RX 250 WO HCPCS: Performed by: NURSE ANESTHETIST, CERTIFIED REGISTERED

## 2020-01-20 PROCEDURE — 3700000000 HC ANESTHESIA ATTENDED CARE: Performed by: INTERNAL MEDICINE

## 2020-01-20 PROCEDURE — 3700000001 HC ADD 15 MINUTES (ANESTHESIA): Performed by: INTERNAL MEDICINE

## 2020-01-20 PROCEDURE — 6360000002 HC RX W HCPCS: Performed by: NURSE ANESTHETIST, CERTIFIED REGISTERED

## 2020-01-20 PROCEDURE — 2709999900 HC NON-CHARGEABLE SUPPLY: Performed by: INTERNAL MEDICINE

## 2020-01-20 PROCEDURE — 7100000011 HC PHASE II RECOVERY - ADDTL 15 MIN: Performed by: INTERNAL MEDICINE

## 2020-01-20 PROCEDURE — 7100000010 HC PHASE II RECOVERY - FIRST 15 MIN: Performed by: INTERNAL MEDICINE

## 2020-01-20 PROCEDURE — 2580000003 HC RX 258: Performed by: INTERNAL MEDICINE

## 2020-01-20 RX ORDER — SODIUM CHLORIDE, SODIUM LACTATE, POTASSIUM CHLORIDE, CALCIUM CHLORIDE 600; 310; 30; 20 MG/100ML; MG/100ML; MG/100ML; MG/100ML
INJECTION, SOLUTION INTRAVENOUS CONTINUOUS
Status: DISCONTINUED | OUTPATIENT
Start: 2020-01-20 | End: 2020-01-20 | Stop reason: HOSPADM

## 2020-01-20 RX ORDER — PROPOFOL 10 MG/ML
INJECTION, EMULSION INTRAVENOUS PRN
Status: DISCONTINUED | OUTPATIENT
Start: 2020-01-20 | End: 2020-01-20 | Stop reason: SDUPTHER

## 2020-01-20 RX ORDER — LIDOCAINE HYDROCHLORIDE 20 MG/ML
INJECTION, SOLUTION INFILTRATION; PERINEURAL PRN
Status: DISCONTINUED | OUTPATIENT
Start: 2020-01-20 | End: 2020-01-20 | Stop reason: SDUPTHER

## 2020-01-20 RX ADMIN — SODIUM CHLORIDE, POTASSIUM CHLORIDE, SODIUM LACTATE AND CALCIUM CHLORIDE: 600; 310; 30; 20 INJECTION, SOLUTION INTRAVENOUS at 11:09

## 2020-01-20 RX ADMIN — PROPOFOL 200 MG: 10 INJECTION, EMULSION INTRAVENOUS at 11:09

## 2020-01-20 RX ADMIN — LIDOCAINE HYDROCHLORIDE 80 MG: 20 INJECTION, SOLUTION INFILTRATION; PERINEURAL at 11:09

## 2020-01-20 ASSESSMENT — PAIN - FUNCTIONAL ASSESSMENT: PAIN_FUNCTIONAL_ASSESSMENT: 0-10

## 2020-01-20 NOTE — PROGRESS NOTES
Pt tolerated procedure well with no adverse events noted, pt and family received and verbally acknowledged understanding of all discharge instructions with all questions answered, pt taken to private vehicle by wheelchair, left unit in good condition.  Peace David RN

## 2020-01-20 NOTE — ANESTHESIA PRE PROCEDURE
Department of Anesthesiology  Preprocedure Note       Name:  Earl Zurita   Age:  80 y.o.  :  1934                                          MRN:  [id number]         Date:  2020      Surgeon: Jose Ureña):  Cong Hirsch MD    Procedure: COLON W/ANES (11:30) (N/A )    Medications prior to admission:   Prior to Admission medications    Medication Sig Start Date End Date Taking? Authorizing Provider   bisacodyl (BISACODYL) 5 MG EC tablet Take 4 tablets of Bisacodyl for colonoscopy prep as directed. 20   Cong Hirsch MD   zoster recombinant adjuvanted vaccine [facility]) 50 MCG/0.5ML SUSR injection Inject 0.5 mLs into the muscle See Admin Instructions 1 dose now and repeat in 2-6 months 19  Marcie Dobbins MD   mirabegron (MYRBETRIQ) 50 MG TB24 Take 50 mg by mouth daily    Historical Provider, MD   oxybutynin (DITROPAN-XL) 10 MG extended release tablet Take 10 mg by mouth daily    Historical Provider, MD   montelukast (SINGULAIR) 10 MG tablet TAKE 1 TABLET DAILY 19   Marcie Dobbins MD   lisinopril-hydrochlorothiazide (PRINZIDE;ZESTORETIC) 20-25 MG per tablet TAKE 1 TABLET DAILY 19   Marcie Dobbins MD   metoprolol tartrate (LOPRESSOR) 25 MG tablet TAKE 1 TABLET TWICE A DAY 10/16/19   Linden Bartlett MD   oxyCODONE-acetaminophen (PERCOCET) 5-325 MG per tablet Take 1 tablet by mouth 3 times daily for 9 days.  8/6/19 8/15/19  JUSTIN Gross - CNP   loratadine (CLARITIN) 10 MG tablet Take 1 tablet by mouth daily 19   Marcie Dobbins MD   simvastatin (ZOCOR) 40 MG tablet TAKE 1 TABLET NIGHTLY 5/3/19   Marcie Dobbins MD   fluticasone [facility]) 50 MCG/ACT nasal spray 2 sprays by Nasal route daily 19   Marcie Dobbins MD   Blood Pressure KIT Check blood pressures and bring log to next visit 3/26/18   Sotirios Koros, DO   CALCIUM PO Take by mouth    Historical Provider, MD   aspirin 81 MG tablet Take 81 mg by mouth daily    Historical Provider, MD   Biotin 1 MG CAPS Take 1 capsule by mouth daily    Historical Provider, MD   guaiFENesin (MUCINEX) 600 MG SR tablet Take 1,200 mg by mouth nightly    Historical Provider, MD   Calcium Carbonate-Vitamin D (CALCIUM + D PO) Take 1,200 mg by mouth daily     Historical Provider, MD   Multiple Vitamins-Minerals (MULTI FOR HER PO) Take  by mouth. Historical Provider, MD       Current medications:    No current facility-administered medications for this encounter. Current Outpatient Medications   Medication Sig Dispense Refill    bisacodyl (BISACODYL) 5 MG EC tablet Take 4 tablets of Bisacodyl for colonoscopy prep as directed. 4 tablet 0    zoster recombinant adjuvanted vaccine (SHINGRIX) 50 MCG/0.5ML SUSR injection Inject 0.5 mLs into the muscle See Admin Instructions 1 dose now and repeat in 2-6 months 0.5 mL 0    mirabegron (MYRBETRIQ) 50 MG TB24 Take 50 mg by mouth daily      oxybutynin (DITROPAN-XL) 10 MG extended release tablet Take 10 mg by mouth daily      montelukast (SINGULAIR) 10 MG tablet TAKE 1 TABLET DAILY 90 tablet 1    lisinopril-hydrochlorothiazide (PRINZIDE;ZESTORETIC) 20-25 MG per tablet TAKE 1 TABLET DAILY 90 tablet 1    metoprolol tartrate (LOPRESSOR) 25 MG tablet TAKE 1 TABLET TWICE A  tablet 2    oxyCODONE-acetaminophen (PERCOCET) 5-325 MG per tablet Take 1 tablet by mouth 3 times daily for 9 days.  27 tablet 0    loratadine (CLARITIN) 10 MG tablet Take 1 tablet by mouth daily 30 tablet 5    simvastatin (ZOCOR) 40 MG tablet TAKE 1 TABLET NIGHTLY 90 tablet 3    fluticasone (FLONASE) 50 MCG/ACT nasal spray 2 sprays by Nasal route daily 3 Bottle 3    Blood Pressure KIT Check blood pressures and bring log to next visit 1 kit 0    CALCIUM PO Take by mouth      aspirin 81 MG tablet Take 81 mg by mouth daily      Biotin 1 MG CAPS Take 1 capsule by mouth daily      guaiFENesin (MUCINEX) 600 MG SR tablet Take 1,200 mg by mouth nightly      Calcium Carbonate-Vitamin D (CALCIUM + D PO) Take 1,200 mg by mouth daily       Multiple Vitamins-Minerals (MULTI FOR HER PO) Take  by mouth. Allergies:     Allergies   Allergen Reactions    Cipro Xr Hives    Clindamycin/Lincomycin Hives       Problem List:    Patient Active Problem List   Diagnosis Code    Cervical myelopathy ([facility] [address] G95.9    Cervical stenosis of spinal canal M48.02    HTN (hypertension) I10    HLD (hyperlipidemia) E78.5    Lumbar stenosis with neurogenic claudication M48.062    Cervical stenosis of spinal canal M48.02    Status post lumbar spinal fusion Z98.1    Status post cervical arthrodesis Z98.1    Allergic rhinitis J30.9    Mild concentric left ventricular hypertrophy (LVH) I51.7    Degeneration of lumbar or lumbosacral intervertebral disc M51.37    Displacement of lumbar intervertebral disc without myelopathy M51.26    Lumbosacral spondylosis without myelopathy M47.817    Spinal stenosis, lumbar region, without neurogenic claudication M48.061    Disc displacement, lumbar M51.26    Lumbar facet arthropathy M47.816    Lumbar stenosis M48.061    S/P lumbar fusion Z98.1    OAB (overactive bladder) N32.81    Occult blood in stools R19.5       Past Medical History:        Diagnosis Date    Allergic rhinitis     Arthritis     Bladder dysfunction     overactive    Blood transfusion     after surgery 2008, 2012    Cancer [facility])     skin cancer above rt eye excised    Cervical stenosis of spine     Chronic constipation     Constipation, other cause     due to pain medication    GERD (gastroesophageal reflux disease)     controlled w/diet    Hyperlipidemia     Hypertension     Incontinence of urine     Lumbar stenosis with neurogenic claudication     SCC (squamous cell carcinoma)     right side of the face    Sinus congestion     Skin abnormality 8/2012    skin peeling right medial foot/heel improving with OTC antifungal cream    Sleep apnea     cpap does not use    Wears dentures     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 Anesthesia Plan      TIVA     ASA 3       Induction: intravenous. Anesthetic plan and risks discussed with patient. Plan discussed with CRNA. All questions answered and agrees with plan.         Russel Velasquez MD   1/20/2020

## 2020-01-20 NOTE — H&P
education level: Not on file   Occupational History    Not on file   Social Needs    Financial resource strain: Not on file    Food insecurity:       Worry: Not on file       Inability: Not on file    Transportation needs:       Medical: Not on file       Non-medical: Not on file   Tobacco Use    Smoking status: Never Smoker    Smokeless tobacco: Never Used   Substance and Sexual Activity    Alcohol use: No    Drug use: No    Sexual activity: Not Currently   Lifestyle    Physical activity:       Days per week: Not on file       Minutes per session: Not on file    Stress: Not on file   Relationships    Social connections:       Talks on phone: Not on file       Gets together: Not on file       Attends [employer] service: Not on file       Active member of club or organization: Not on file       Attends meetings of clubs or organizations: Not on file       Relationship status: Not on file    Intimate partner violence:       Fear of current or ex partner: Not on file       Emotionally abused: Not on file       Physically abused: Not on file       Forced sexual activity: Not on file   Other Topics Concern    Not on file   Social History Narrative    Not on file         SURGICAL HISTORY      Past Surgical History         Past Surgical History:   Procedure Laterality Date    APPENDECTOMY        BACK SURGERY         [facility] 5258;6005   [id number]     lumbar laminectomy L3-L5 with pedicle screws    CARPAL TUNNEL RELEASE         rt hand 1990    CERVICAL DISCECTOMY   2/15/12     C4-5 complete and radical discectomy and bilateral foraminotomy and take down of posterior longitudinal ligament    CHOLECYSTECTOMY        COLONOSCOPY        CYSTOSCOPY N/A 01/04/2019     RIGID CYSTOSCOPY     CYSTOSCOPY N/A 1/4/2019     RIGID CYSTOSCOPY performed by Eldora Paget, MD at [facility], COLON, DIAGNOSTIC        FOOT SURGERY         repair torn tendon with platelet infusion rt  Blood Pressure KIT Check blood pressures and bring log to next visit 1 kit 0    CALCIUM PO Take by mouth        aspirin 81 MG tablet Take 81 mg by mouth daily        Biotin 1 MG CAPS Take 1 capsule by mouth daily        guaiFENesin (MUCINEX) 600 MG SR tablet Take 1,200 mg by mouth nightly        Calcium Carbonate-Vitamin D (CALCIUM + D PO) Take 1,200 mg by mouth daily         Multiple Vitamins-Minerals (MULTI FOR HER PO) Take  by mouth.               ALLERGIES           Allergies   Allergen Reactions    Cipro Xr Hives    Clindamycin/Lincomycin Hives      IMMUNIZATIONS           Immunization History   Administered Date(s) Administered    DT (pediatric) 01/01/2008    Influenza 11/07/2016    Influenza Vaccine, unspecified formulation 10/22/2015, 10/01/2016, 11/07/2016    Influenza Virus Vaccine 11/07/2016    Influenza, High Dose (Fluzone 65 yrs and older) 10/22/2015, 10/01/2016, 10/05/2017, 10/08/2018, 10/10/2019    Pneumococcal Conjugate 13-valent ([id number]) 02/17/2016, 10/01/2016    Pneumococcal Polysaccharide ([id number]) 01/01/2005, 01/01/2014      REVIEW OF SYSTEMS      Constitutional: denies fever and unexpected weight change. HENT: Negative for ear pain, hearing loss and nosebleeds. Eyes: Negative for pain and visual disturbance. Respiratory: Negative for cough, shortness of breath and wheezing. Cardiovascular: Negative for chest pain, palpitations and leg swelling. Gastrointestinal: see HPI for details. Endocrine: Negative for polydipsia, polyphagia and polyuria. Genitourinary: Negative for difficulty urinating, dysuria, hematuria and urgency. Musculoskeletal: Positive for arthralgias and back pain. Skin: Negative for pallor and rash. Allergic/Immunologic: Negative for environmental allergies and immunocompromised state. Neurological: Negative for seizures, syncope. Hematological: Negative for adenopathy. Does not bruise/bleed easily.    Psychiatric/Behavioral:

## 2020-01-22 PROBLEM — M48.061 SPINAL STENOSIS OF LUMBAR REGION: Status: ACTIVE | Noted: 2020-01-22

## 2020-01-30 ENCOUNTER — OFFICE VISIT (OUTPATIENT)
Dept: ENT CLINIC | Age: 85
End: 2020-01-30
Payer: MEDICARE

## 2020-01-30 VITALS
HEIGHT: 66 IN | WEIGHT: 192.6 LBS | HEART RATE: 70 BPM | TEMPERATURE: 98.2 F | DIASTOLIC BLOOD PRESSURE: 62 MMHG | BODY MASS INDEX: 30.95 KG/M2 | SYSTOLIC BLOOD PRESSURE: 117 MMHG

## 2020-01-30 PROCEDURE — 99203 OFFICE O/P NEW LOW 30 MIN: CPT | Performed by: OTOLARYNGOLOGY

## 2020-01-30 RX ORDER — AZELASTINE 1 MG/ML
1 SPRAY, METERED NASAL DAILY
Qty: 1 BOTTLE | Refills: 1 | Status: SHIPPED | OUTPATIENT
Start: 2020-01-30 | End: 2020-02-24

## 2020-01-30 RX ORDER — FLUTICASONE PROPIONATE 50 MCG
1 SPRAY, SUSPENSION (ML) NASAL EVERY EVENING
Qty: 3 BOTTLE | Refills: 3 | Status: SHIPPED | OUTPATIENT
Start: 2020-01-30 | End: 2020-03-31

## 2020-01-30 NOTE — PROGRESS NOTES
[address] [facility]  NEW PATIENT HISTORY AND PHYSICAL NOTE      Patient Name: Fartun Simmons Record Number:  [id number]  Primary Care Physician:  Destiny Rubio MD    ChiefComplaint     Chief Complaint   Patient presents with    Nasal Congestion     Constanly congested, currently taking medications that don't seem to be working. History of Present Illness     Vivi Flores is an 80 y.o. female with history of chronic nasal congestion for approximately 14 months - feels significant nasal obstruction constantly, denies rhinorrhea, post-nasal drip. Occasionally has excursion of thick mucus when she blows her nose, no purulent contents. No facial pain or congestion, no hyposmia/anosmia, no epistaxis. No history of nasal /sinus surgery or trauma. Currently on fluticasone, loratadine, singulair - also on metoprolol. No history of asthma or ASA sensitivity. No seasonal allergies. Denies tobacco use. Has history of hearing loss, wears BTE hearing aids. Denies otalgia, aural fullness, otorrhea, vertigo. Prior history of spinal fusion (lumbar).   CT maxillofacial/sinus 12/23/2019 showed bilateral mucosal maxillary thickening with no air-fluid levels - scant thickening in the sphenoid sinus on the right side (Gaylord-Oneal score 3-4/24)    Past Medical History     Past Medical History:   Diagnosis Date    Allergic rhinitis     Arthritis     Bladder dysfunction     overactive    Blood transfusion     after surgery 2008, 2012    Cancer [facility])     skin cancer above rt eye excised    Cervical stenosis of spine     Chronic constipation     Constipation, other cause     due to pain medication    Dental disease     GERD (gastroesophageal reflux disease)     controlled w/diet    Hearing loss     Hyperlipidemia     Hypertension     Incontinence of urine     Lumbar stenosis with neurogenic claudication     SCC (squamous cell carcinoma)     right side of the face Social History     Social History     Tobacco Use    Smoking status: Never Smoker    Smokeless tobacco: Never Used   Substance Use Topics    Alcohol use: Yes     Comment: rarely    Drug use: No        Allergies     Allergies   Allergen Reactions    Cipro Xr Hives    Clindamycin/Lincomycin Hives       Medications     Current Outpatient Medications   Medication Sig Dispense Refill    Polyethylene Glycol 3350 (MIRALAX PO) Take by mouth daily      azelastine (ASTELIN) 0.1 % nasal spray 1 spray by Nasal route daily Use in each nostril as directed 1 Bottle 1    fluticasone (FLONASE) 50 MCG/ACT nasal spray 1 spray by Nasal route every evening 3 Bottle 3    [START ON 3/4/2020] oxyCODONE-acetaminophen (PERCOCET) 5-325 MG per tablet Take 1 tablet by mouth 3 times daily for 30 days. 90 tablet 0    mirabegron (MYRBETRIQ) 50 MG TB24 Take 50 mg by mouth daily      oxybutynin (DITROPAN-XL) 10 MG extended release tablet Take 10 mg by mouth daily      montelukast (SINGULAIR) 10 MG tablet TAKE 1 TABLET DAILY 90 tablet 1    lisinopril-hydrochlorothiazide (PRINZIDE;ZESTORETIC) 20-25 MG per tablet TAKE 1 TABLET DAILY 90 tablet 1    metoprolol tartrate (LOPRESSOR) 25 MG tablet TAKE 1 TABLET TWICE A  tablet 2    loratadine (CLARITIN) 10 MG tablet Take 1 tablet by mouth daily 30 tablet 5    simvastatin (ZOCOR) 40 MG tablet TAKE 1 TABLET NIGHTLY 90 tablet 3    Blood Pressure KIT Check blood pressures and bring log to next visit 1 kit 0    aspirin 81 MG tablet Take 81 mg by mouth daily      Biotin 1 MG CAPS Take 1 capsule by mouth daily      guaiFENesin (MUCINEX) 600 MG SR tablet Take 1,200 mg by mouth nightly      Calcium Carbonate-Vitamin D (CALCIUM + D PO) Take 1,200 mg by mouth daily       Multiple Vitamins-Minerals (MULTI FOR HER PO) Take  by mouth.         zoster recombinant adjuvanted vaccine [facility]) 50 MCG/0.5ML SUSR injection Inject 0.5 mLs into the muscle See Admin Instructions 1 dose now and tuning fork the Estrada is midline, The [place]in[place] on the right ear the is air>bone conduction, on the left ear air>bone conduction  FACE: 1/6 House-Brackmann Scale, symmetric, sensation equal bilaterally  ORAL CAVITY: No masses or lesions palpated, uvula is midline, moist mucous membranes, 0+ tonsils, dentition with multiple caps  -Dental mirror exam: Base of tongue with no masses, uvula anatomically normal   NECK: Normal range of motion, no thyromegaly, trachea is midline, no lymphadenopathy, no neck masses, no crepitus  CHEST: Normal respiratory effort, no retractions, breathing comfortably  SKIN: No rashes, normal appearing skin, no evidence of skin lesions/tumors  NEURO: CN 2, 3, 4, 5, 6, 7, 11, 12 intact bilaterally     Procedure     None    Assessment and Plan     1. Nasal congestion  Patient with nasal congestion that has been ongoing for 14 months, states this is a daily occurrence - has slight improvement on fluticasone (started 12/16/19). CT imaging without sinus disease. May represent allergic rhinitis, exposure to suspended particulates. Examination with slight erythema of the nasal mucosa. Will add topical antihistamine to regimen - may stop oral antihistamines/monteleukast and see if condition is stable. If no improvement, will add nasal saline rinses, and perform nasal endoscopy at next visit. - azelastine (ASTELIN) 0.1 % nasal spray; 1 spray by Nasal route daily Use in each nostril as directed  Dispense: 1 Bottle; Refill: 1    Return in about 6 weeks (around 3/12/2020). Sukhi Lynch MD  [facility]  Department of Otolaryngology/Head and Neck Surgery  1/30/20    I have performed a head and neck physical exam personally or was physically present during the key or critical portions of the service. Medical Decision Making:   The following items were considered in medical decision making:  Independent review of images  Review / order clinical lab tests  Review / order radiology

## 2020-02-24 RX ORDER — AZELASTINE 1 MG/ML
SPRAY, METERED NASAL
Qty: 1 BOTTLE | Refills: 1 | Status: SHIPPED | OUTPATIENT
Start: 2020-02-24 | End: 2020-06-10 | Stop reason: ALTCHOICE

## 2020-03-09 ENCOUNTER — OFFICE VISIT (OUTPATIENT)
Dept: ENT CLINIC | Age: 85
End: 2020-03-09
Payer: MEDICARE

## 2020-03-09 VITALS
DIASTOLIC BLOOD PRESSURE: 53 MMHG | TEMPERATURE: 97.6 F | SYSTOLIC BLOOD PRESSURE: 119 MMHG | HEIGHT: 66 IN | WEIGHT: 192.4 LBS | BODY MASS INDEX: 30.92 KG/M2 | HEART RATE: 63 BPM

## 2020-03-09 PROCEDURE — 99213 OFFICE O/P EST LOW 20 MIN: CPT | Performed by: OTOLARYNGOLOGY

## 2020-03-09 NOTE — PROGRESS NOTES
Past Medical History:   Diagnosis Date    Allergic rhinitis     Arthritis     Bladder dysfunction     overactive    Blood transfusion     after surgery 2008, 2012    Cancer [facility])     skin cancer above rt eye excised    Cervical stenosis of spine     Chronic constipation     Constipation, other cause     due to pain medication    Dental disease     GERD (gastroesophageal reflux disease)     controlled w/diet    Hearing loss     Hyperlipidemia     Hypertension     Incontinence of urine     Lumbar stenosis with neurogenic claudication     SCC (squamous cell carcinoma)     right side of the face    Sinus congestion     Skin abnormality 8/2012    skin peeling right medial foot/heel improving with OTC antifungal cream    Sleep apnea     cpap does not use    Wears dentures     partial       Past Surgical History     Past Surgical History:   Procedure Laterality Date    APPENDECTOMY      BACK SURGERY      cervical discectomy 1961;3459   Dinah Gigreta BACK SURGERY  [id number]    lumbar laminectomy L3-L5 with pedicle screws    CARPAL TUNNEL RELEASE      rt hand 1990    CERVICAL DISCECTOMY  2/15/12    C4-5 complete and radical discectomy and bilateral foraminotomy and take down of posterior longitudinal ligament    CHOLECYSTECTOMY      COLONOSCOPY      COLONOSCOPY N/A 1/20/2020    COLONOSCOPY POLYPECTOMY SNARE/COLD BIOPSY performed by Issa Brownlee MD at [address] [place] [address] N/A 01/04/2019    RIGID CYSTOSCOPY     CYSTOSCOPY N/A 1/4/2019    RIGID CYSTOSCOPY performed by Katey Bello MD at [place] [address], COLON, DIAGNOSTIC      FOOT SURGERY      repair torn tendon with platelet infusion rt foot 2010    HYSTERECTOMY      partial 1976    JOINT REPLACEMENT      rt knee 2008    JOINT REPLACEMENT      lft knee 2014    LAMINECTOMY  9/11/12    C4-5-6 Laminectomy    LUMBAR LAMINECTOMY      1991    OTHER SURGICAL HISTORY  09/11/2012     C4-7 LAMINECTOMY WITH C4-5-6 FUSION WITH (LOPRESSOR) 25 MG tablet TAKE 1 TABLET TWICE A  tablet 2    loratadine (CLARITIN) 10 MG tablet Take 1 tablet by mouth daily 30 tablet 5    simvastatin (ZOCOR) 40 MG tablet TAKE 1 TABLET NIGHTLY 90 tablet 3    Blood Pressure KIT Check blood pressures and bring log to next visit 1 kit 0    aspirin 81 MG tablet Take 81 mg by mouth daily      Biotin 1 MG CAPS Take 1 capsule by mouth daily      guaiFENesin (MUCINEX) 600 MG SR tablet Take 1,200 mg by mouth nightly      Calcium Carbonate-Vitamin D (CALCIUM + D PO) Take 1,200 mg by mouth daily       Multiple Vitamins-Minerals (MULTI FOR HER PO) Take  by mouth. No current facility-administered medications for this visit.         Review of Systems     REVIEW OF SYSTEMS  The following systems were reviewed and revealed the following in addition to any already discussed in the HPI:    CONSTITUTIONAL: No weight loss, no fever, sign night sweats, no chills  EYES: no vision changes, no blurry vision  EARS: no changes in hearing, no otalgia  NOSE: no epistaxis, no rhinorrhea  RESPIRATORY: No difficulty breathing, no shortness of breath  CV: no chest pain, no peripheral vascular disease  HEME: No coagulation disorder, no bleeding disorder  NEURO: No TIA or stroke-like symptoms  SKIN: No new rashes in the head and neck, no recent skin cancers  MOUTH: No new ulcers, no recent teeth infections  GASTROINTESTINAL: No diarrhea, stomach pain  PSYCH: No anxiety, no depression    PhysicalExam     Vitals:    03/09/20 0947   BP: (!) 119/53   Site: Right Upper Arm   Position: Sitting   Cuff Size: Medium Adult   Pulse: 63   Temp: 97.6 °F (36.4 °C)   TempSrc: Oral   Weight: 192 lb 6.4 oz (87.3 kg)   Height: 5' 6\" (1.676 m)       PHYSICAL EXAM  BP (!) 119/53 (Site: Right Upper Arm, Position: Sitting, Cuff Size: Medium Adult)   Pulse 63   Temp 97.6 °F (36.4 °C) (Oral)   Ht 5' 6\" (1.676 m)   Wt 192 lb 6.4 oz (87.3 kg)   BMI 31.05 kg/m²     GENERAL: No Acute Distress, Alert and Oriented, no hoarseness  EYES: EOMI, Anti-icteric  NOSE: On anterior rhinoscopy there is no epistaxis, nasal mucosa within normal limits, no purulent drainage. Septum with slight deviation to the left side, slightly decreased nasal airflow through the left side  EARS: Normal external appearance; bilateral behind the ear hearing aids in place   fACE: 1/6 House-Brackmann Scale, symmetric, sensation equal bilaterally  ORAL CAVITY: No masses or lesions palpated, uvula is midline, moist mucous membranes, 0+ tonsils, dentition without tenderness to palpation  NECK: Normal range of motion, no thyromegaly, trachea is midline, no lymphadenopathy, no neck masses, no crepitus, ACDF scar in place on right side of neck  CHEST: Normal respiratory effort, no retractions, breathing comfortably  SKIN: No rashes, normal appearing skin, no evidence of skin lesions/tumors  NEURO: CN 2, 3, 4, 5, 6, 7, 11, 12 intact bilaterally       Procedure     None      Assessment and Plan     1. Non-seasonal allergic rhinitis, unspecified trigger  Patient with perennial allergic rhinitis-has been ongoing for the past 14 months, and she was on multiple therapy including fluticasone, loratadine, guaifenesin, Singulair. We asked her to start fluticasone and Astelin, after which she states 75 to 80% improvement in symptoms. We discussed continued therapy-fluticasone has low side effect profile and can be used long-term, however as a lasting has significant side effects after 2-3 months of use. We counseled her that at the 3-month follow-up visit, we would take her off the Astelin and see how her nasal congestion is progressing- is in agreement with this plan. Return in about 3 months (around 6/9/2020).     Michi Liang MD  [address] [place] [address],[address]  Department of Otolaryngology/Head and Neck Surgery  3/9/20    I have performed a head and neck physical exam personally or was physically present during the key or critical portions of the

## 2020-04-27 NOTE — TELEPHONE ENCOUNTER
Refill Request     Last Seen: 4/10/2018    Last Written: 05/03/2019    Next Appointment:   Future Appointments   Date Time Provider Lacy Alexanderi   6/3/2020 12:30 PM JUSTIN Hernandez - CNP [facility]   6/11/2020  9:30 AM MD OWEN Wilson [facility]   6/19/2020  9:30 AM SCHEDULE, CLAYTON WEAVER  AWV BRANDY WEAVER [facility]   6/19/2020 10:15 AM Serafin Felty, MD [facility]             Requested Prescriptions     Pending Prescriptions Disp Refills    simvastatin (ZOCOR) 40 MG tablet [Pharmacy Med Name: SIMVASTATIN TABS 40MG] 90 tablet 3     Sig: TAKE 1 TABLET NIGHTLY

## 2020-04-28 RX ORDER — SIMVASTATIN 40 MG
TABLET ORAL
Qty: 90 TABLET | Refills: 1 | Status: SHIPPED | OUTPATIENT
Start: 2020-04-28 | End: 2020-10-26

## 2020-05-04 RX ORDER — LISINOPRIL AND HYDROCHLOROTHIAZIDE 25; 20 MG/1; MG/1
TABLET ORAL
Qty: 90 TABLET | Refills: 1 | Status: SHIPPED | OUTPATIENT
Start: 2020-05-04 | End: 2020-11-02

## 2020-06-10 ENCOUNTER — OFFICE VISIT (OUTPATIENT)
Dept: ENT CLINIC | Age: 85
End: 2020-06-10
Payer: MEDICARE

## 2020-06-10 VITALS
WEIGHT: 191.4 LBS | SYSTOLIC BLOOD PRESSURE: 124 MMHG | TEMPERATURE: 97.5 F | BODY MASS INDEX: 31.89 KG/M2 | HEART RATE: 57 BPM | HEIGHT: 65 IN | DIASTOLIC BLOOD PRESSURE: 53 MMHG

## 2020-06-10 PROCEDURE — 99213 OFFICE O/P EST LOW 20 MIN: CPT | Performed by: OTOLARYNGOLOGY

## 2020-06-10 PROCEDURE — 69210 REMOVE IMPACTED EAR WAX UNI: CPT | Performed by: OTOLARYNGOLOGY

## 2020-06-10 NOTE — PROGRESS NOTES
[address] UP VISIT      Patient Name: Fartun Simmons Record Number:  [id number]  Primary Care Physician:  Serafin Felty, MD    ChiefComplaint     Chief Complaint   Patient presents with    Follow-up     Has been feeling \"alright\" since last visit. Still gets congested here and there     History of Present Illness     Anahi Rea is an 80 y.o. female previously seen for chronic nasal congestion for approximately 14 months - feels significant nasal obstruction constantly, denies rhinorrhea, post-nasal drip. Occasionally has excursion of thick mucus when she blows her nose, no purulent contents. No facial pain or congestion, no hyposmia/anosmia, no epistaxis. No history of nasal /sinus surgery or trauma. Currently on fluticasone, loratadine, singulair - also on metoprolol. No history of asthma or ASA sensitivity. No seasonal allergies. Denies tobacco use.     Has history of hearing loss, wears BTE hearing aids. Denies otalgia, aural fullness, otorrhea, vertigo. Prior history of spinal fusion (lumbar). CT maxillofacial/sinus 12/23/2019 showed bilateral mucosal maxillary thickening with no air-fluid levels - scant thickening in the sphenoid sinus on the right side (White Post-Oneal score 3-4/24)    At last visit we had her continue fluticasone, and started her on azelastine. She is also on an oral antihistamine and singulair - she is also on lisinopril/HCTZ,     Interval History: Continues to use fluticasone (QHS) and azelastine in the morning, intermittently as she feels her nose has significantly improved (usually every other day). Continued cessation of loratadine, Mucinex and Singulair. States 75-80% improvement in nasal congestion-denies significant rhinorrhea or postnasal drip, continues to have slight nasal obstruction (left side > right side). Denies facial pain, congestion, hyposmia/anosmia.     Past Medical History     Past Medical History:   Diagnosis Date    Allergic rhinitis     Arthritis     Bladder dysfunction     overactive    Blood transfusion     after surgery 2008, 2012    Cancer [facility])     skin cancer above rt eye excised    Cervical stenosis of spine     Chronic constipation     Constipation, other cause     due to pain medication    Dental disease     GERD (gastroesophageal reflux disease)     controlled w/diet    Hearing loss     Hyperlipidemia     Hypertension     Incontinence of urine     Lumbar stenosis with neurogenic claudication     SCC (squamous cell carcinoma)     right side of the face    Sinus congestion     Skin abnormality 8/2012    skin peeling right medial foot/heel improving with OTC antifungal cream    Sleep apnea     cpap does not use    Wears dentures     partial       Past Surgical History     Past Surgical History:   Procedure Laterality Date    APPENDECTOMY      BACK SURGERY      cervical discectomy 1604;[id number]   [facility] BACK SURGERY  [id number]    lumbar laminectomy L3-L5 with pedicle screws    CARPAL TUNNEL RELEASE      rt hand 1990    CERVICAL DISCECTOMY  2/15/12    C4-5 complete and radical discectomy and bilateral foraminotomy and take down of posterior longitudinal ligament    CHOLECYSTECTOMY      COLONOSCOPY      COLONOSCOPY N/A 1/20/2020    COLONOSCOPY POLYPECTOMY SNARE/COLD BIOPSY performed by Cecil Katz MD at [address] [place] [address] N/A 01/04/2019    RIGID CYSTOSCOPY     CYSTOSCOPY N/A 1/4/2019    RIGID CYSTOSCOPY performed by Misty Jamison MD at [place] [address], COLON, DIAGNOSTIC      FOOT SURGERY      repair torn tendon with platelet infusion rt foot 2010    HYSTERECTOMY      partial 1976    JOINT REPLACEMENT      rt knee 2008    JOINT REPLACEMENT      lft knee 2014    LAMINECTOMY  9/11/12    C4-5-6 Laminectomy    LUMBAR LAMINECTOMY      1991    OTHER SURGICAL HISTORY  09/11/2012     C4-7 LAMINECTOMY WITH C4-5-6 FUSION WITH SPINAL CORD    tablet TAKE 1 TABLET DAILY 90 tablet 1    metoprolol tartrate (LOPRESSOR) 25 MG tablet TAKE 1 TABLET TWICE A  tablet 2    loratadine (CLARITIN) 10 MG tablet Take 1 tablet by mouth daily 30 tablet 5    Blood Pressure KIT Check blood pressures and bring log to next visit 1 kit 0    aspirin 81 MG tablet Take 81 mg by mouth daily      Biotin 1 MG CAPS Take 1 capsule by mouth daily      guaiFENesin (MUCINEX) 600 MG SR tablet Take 1,200 mg by mouth nightly      Calcium Carbonate-Vitamin D (CALCIUM + D PO) Take 1,200 mg by mouth daily       Multiple Vitamins-Minerals (MULTI FOR HER PO) Take  by mouth. No current facility-administered medications for this visit.         Review of Systems     REVIEW OF SYSTEMS  The following systems were reviewed and revealed the following in addition to any already discussed in the HPI:    CONSTITUTIONAL: No weight loss, no fever, sign night sweats, no chills  EYES: no vision changes, no blurry vision  EARS: no changes in hearing, no otalgia  NOSE: no epistaxis, no rhinorrhea  RESPIRATORY: No difficulty breathing, no shortness of breath  CV: no chest pain, no peripheral vascular disease  HEME: No coagulation disorder, no bleeding disorder  NEURO: No TIA or stroke-like symptoms  SKIN: No new rashes in the head and neck, no recent skin cancers  MOUTH: No new ulcers, no recent teeth infections  GASTROINTESTINAL: No diarrhea, stomach pain  PSYCH: No anxiety, no depression    PhysicalExam     Vitals:    06/10/20 0915   BP: (!) 124/53   Site: Left Upper Arm   Position: Sitting   Cuff Size: Medium Adult   Pulse: 57   Temp: 97.5 °F (36.4 °C)   TempSrc: Oral   Weight: 191 lb 6.4 oz (86.8 kg)   Height: 5' 5\" (1.651 m)       PHYSICAL EXAM  BP (!) 124/53 (Site: Left Upper Arm, Position: Sitting, Cuff Size: Medium Adult)   Pulse 57   Temp 97.5 °F (36.4 °C) (Oral)   Ht 5' 5\" (1.651 m)   Wt 191 lb 6.4 oz (86.8 kg)   BMI 31.85 kg/m²     GENERAL: No Acute Distress, Alert and Oriented, no hoarseness  EYES: EOMI, Anti-icteric  NOSE: On anterior rhinoscopy there is no epistaxis, nasal mucosa within normal limits, no purulent drainage. There is deviation of the bony pyramid and septal dorsum to the left side, tip deviation to the left, straight columella. Septum with slight deviation to the left side, slightly decreased nasal airflow through the left side. Bilateral nasal valve collapse on inspiration. EARS: Normal external appearance; bilateral in-ear hearing aids noted - right ear with impacted cerumen, removed with wax curette. Left ear clear. fACE: 1/6 House-Brackmann Scale, symmetric, sensation equal bilaterally  ORAL CAVITY: No masses or lesions palpated, uvula is midline, moist mucous membranes, 0+ tonsils, dentition without tenderness to palpation  NECK: Normal range of motion, no thyromegaly, trachea is midline, no lymphadenopathy, no neck masses, no crepitus, ACDF scar in place on right side of neck  CHEST: Normal respiratory effort, no retractions, breathing comfortably  SKIN: No rashes, normal appearing skin, no evidence of skin lesions/tumors  NEURO: CN 2, 3, 4, 5, 6, 7, 11, 12 intact bilaterally     Procedure     None      Assessment and Plan     1. Non-seasonal allergic rhinitis, unspecified trigger  Patient with perennial allergic rhinitis-has been ongoing for the past 14 months, and she was on multiple therapy including fluticasone, loratadine, guaifenesin, Singulair. We asked her to start fluticasone and Astelin, after which she states 75 to 80% improvement in symptoms. We discussed continued therapy-fluticasone has low side effect profile and can be used long-term, however as a lasting has significant side effects after 2-3 months of use. We will therefore take her off the Astelin and see how her nasal congestion is progressing- is in agreement with this plan. She will follow up with any worsened nasal congestion.      2. Impacted cerumen, right ear  Likely due

## 2020-06-11 ENCOUNTER — TELEPHONE (OUTPATIENT)
Dept: FAMILY MEDICINE CLINIC | Age: 85
End: 2020-06-11

## 2020-06-11 NOTE — TELEPHONE ENCOUNTER
[facility] received a call from:    Name of Caller: Federico Joyce    Relationship to patient:self     Organization name: patient     Best contact number: [phone]    Reason for call: Patient is schedule for an vv 6/19 patient does not have access to complete an vv

## 2020-06-19 ENCOUNTER — VIRTUAL VISIT (OUTPATIENT)
Dept: FAMILY MEDICINE CLINIC | Age: 85
End: 2020-06-19
Payer: MEDICARE

## 2020-06-19 PROBLEM — M46.96 UNSPECIFIED INFLAMMATORY SPONDYLOPATHY, LUMBAR REGION (HCC): Status: ACTIVE | Noted: 2020-06-19

## 2020-06-19 PROCEDURE — G0439 PPPS, SUBSEQ VISIT: HCPCS | Performed by: FAMILY MEDICINE

## 2020-06-19 ASSESSMENT — PATIENT HEALTH QUESTIONNAIRE - PHQ9
SUM OF ALL RESPONSES TO PHQ QUESTIONS 1-9: 0
SUM OF ALL RESPONSES TO PHQ QUESTIONS 1-9: 0

## 2020-06-19 ASSESSMENT — LIFESTYLE VARIABLES: HOW OFTEN DO YOU HAVE A DRINK CONTAINING ALCOHOL: 0

## 2020-06-19 NOTE — PROGRESS NOTES
within the past year?: (!) No  There is no height or weight on file to calculate BMI. Health Habits/Nutrition Interventions:  · Dental exam overdue:  patient encouraged to make appointment with his/her dentist    Hearing/Vision:  No exam data present  Hearing/Vision  Do you or your family notice any trouble with your hearing?: No  Do you have difficulty driving, watching TV, or doing any of your daily activities because of your eyesight?: (!) Yes  Have you had an eye exam within the past year?: (!) No  Hearing/Vision Interventions:  · Vision concerns:  patient encouraged to make appointment with his/her eye specialist    Safety:  Safety  Do you have working smoke detectors?: Yes  Have all throw rugs been removed or fastened?: Yes  Do you have non-slip mats or surfaces in all bathtubs/showers?: (!) No  Do all of your stairways have a railing or banister?: Yes  Are your doorways, halls and stairs free of clutter?: Yes  Do you always fasten your seatbelt when you are in a car?: Yes  Safety Interventions:  · Home safety tips provided    ADL:  ADLs  In the past 7 days, did you need help from others to perform any of the following everyday activities? Eating, dressing, grooming, bathing, toileting, or walking/balance?: None  In the past 7 days, did you need help from others to take care of any of the following?  Laundry, housekeeping, banking/finances, shopping, telephone use, food preparation, transportation, or taking medications?: [employer], Housekeeping, Shopping, Transportation, Food Preparation  ADL Interventions:  · Patient declines any further evaluation/treatment for this issue    Personalized Preventive Plan   Current Health Maintenance Status  Immunization History   Administered Date(s) Administered    DT (pediatric) 01/01/2008    Influenza 11/07/2016    Influenza Vaccine, unspecified formulation 10/22/2015, 10/01/2016, 11/07/2016    Influenza Virus Vaccine 11/07/2016    Influenza, High Dose (Fluzone 65 yrs and older) 10/22/2015, 10/01/2016, 10/05/2017, 10/08/2018, 10/10/2019    Pneumococcal Conjugate 13-valent (Gayland Vanda) 02/17/2016, 10/01/2016    Pneumococcal Polysaccharide ([id number]) 01/01/2005, 01/01/2014    Zoster Recombinant (Shingrix) 12/18/2019, 02/19/2020        Health Maintenance   Topic Date Due    DTaP/Tdap/Td vaccine (2 - Tdap) 01/01/2018    Annual Wellness Visit (AWV)  05/29/2019    Lipid screen  04/29/2020    Potassium monitoring  11/19/2020    Creatinine monitoring  11/19/2020    DEXA (modify frequency per FRAX score)  Completed    Flu vaccine  Completed    Shingles Vaccine  Completed    Pneumococcal 65+ years Vaccine  Completed    Hepatitis A vaccine  Aged Out    Hepatitis B vaccine  Aged Out    Hib vaccine  Aged Out    Meningococcal (ACWY) vaccine  Aged Out     Recommendations for Friendly Score Due: see orders and patient instructions/AVS.  . Recommended screening schedule for the next 5-10 years is provided to the patient in written form: see Patient Ira Randle was seen today for medicare awv, hypertension and hyperlipidemia. Diagnoses and all orders for this visit:    Routine general medical examination at a health care facility  Preventative plan per above. Patient to come in for fasting blood work. Td per orders. All care gaps addressed. Needs eye exam, referral placed. Eye exam, routine  -     Shandra Cummings MD, Ophthalmology, [facility]    Cervical myelopathy [facility])  This problem is stable. She continues to follow up with pain management regularly (at least every 3 months or per their required frequency). Unspecified inflammatory spondylopathy, lumbar region [facility])  This problem is stable. She continues to follow up with pain management regularly (at least every 3 months or per their required frequency). Need for Td vaccine  -     Td (adult), 2 Lf Tetanus Toxoid, PF (Td, absorbed);  Future              Micheline Ta is a 80

## 2020-06-23 LAB
A/G RATIO: 1.8 (ref 1.1–2.2)
ALBUMIN SERPL-MCNC: 4.5 G/DL (ref 3.4–5)
ALP BLD-CCNC: 69 U/L (ref 40–129)
ALT SERPL-CCNC: 24 U/L (ref 10–40)
ANION GAP SERPL CALCULATED.3IONS-SCNC: 14 MMOL/L (ref 3–16)
AST SERPL-CCNC: 23 U/L (ref 15–37)
BASOPHILS ABSOLUTE: 0.1 K/UL (ref 0–0.2)
BASOPHILS RELATIVE PERCENT: 0.6 %
BILIRUB SERPL-MCNC: 0.4 MG/DL (ref 0–1)
BUN BLDV-MCNC: 26 MG/DL (ref 7–20)
CALCIUM SERPL-MCNC: 10.7 MG/DL (ref 8.3–10.6)
CHLORIDE BLD-SCNC: 101 MMOL/L (ref 99–110)
CHOLESTEROL, TOTAL: 123 MG/DL (ref 0–199)
CO2: 28 MMOL/L (ref 21–32)
CREAT SERPL-MCNC: 0.8 MG/DL (ref 0.6–1.2)
EOSINOPHILS ABSOLUTE: 0.3 K/UL (ref 0–0.6)
EOSINOPHILS RELATIVE PERCENT: 4 %
GFR AFRICAN AMERICAN: >60
GFR NON-AFRICAN AMERICAN: >60
GLOBULIN: 2.5 G/DL
GLUCOSE BLD-MCNC: 129 MG/DL (ref 70–99)
HCT VFR BLD CALC: 36.3 % (ref 36–48)
HDLC SERPL-MCNC: 36 MG/DL (ref 40–60)
HEMOGLOBIN: 12.2 G/DL (ref 12–16)
LDL CHOLESTEROL CALCULATED: 48 MG/DL
LYMPHOCYTES ABSOLUTE: 1.6 K/UL (ref 1–5.1)
LYMPHOCYTES RELATIVE PERCENT: 20.8 %
MCH RBC QN AUTO: 30.9 PG (ref 26–34)
MCHC RBC AUTO-ENTMCNC: 33.7 G/DL (ref 31–36)
MCV RBC AUTO: 91.7 FL (ref 80–100)
MONOCYTES ABSOLUTE: 0.5 K/UL (ref 0–1.3)
MONOCYTES RELATIVE PERCENT: 6.3 %
NEUTROPHILS ABSOLUTE: 5.3 K/UL (ref 1.7–7.7)
NEUTROPHILS RELATIVE PERCENT: 68.3 %
PDW BLD-RTO: 14.7 % (ref 12.4–15.4)
PLATELET # BLD: 188 K/UL (ref 135–450)
PMV BLD AUTO: 9.1 FL (ref 5–10.5)
POTASSIUM SERPL-SCNC: 4.4 MMOL/L (ref 3.5–5.1)
RBC # BLD: 3.96 M/UL (ref 4–5.2)
SODIUM BLD-SCNC: 143 MMOL/L (ref 136–145)
TOTAL PROTEIN: 7 G/DL (ref 6.4–8.2)
TRIGL SERPL-MCNC: 196 MG/DL (ref 0–150)
TSH REFLEX: 1.71 UIU/ML (ref 0.27–4.2)
VLDLC SERPL CALC-MCNC: 39 MG/DL
WBC # BLD: 7.8 K/UL (ref 4–11)

## 2020-06-24 LAB
ESTIMATED AVERAGE GLUCOSE: 111.2 MG/DL
HBA1C MFR BLD: 5.5 %

## 2020-07-30 ENCOUNTER — NURSE TRIAGE (OUTPATIENT)
Dept: OTHER | Facility: CLINIC | Age: 85
End: 2020-07-30

## 2020-07-30 NOTE — TELEPHONE ENCOUNTER
Received call from Masoud Teresa, [facility], [place]. Patient called in stated she had a tetanus shot yesterday after noon, and woke up this morning, her stomach felt upset, she vomited x 1, chills, fever, forehead temp of 100.7 at 1625, see triage assessment below. Care Advice provided; patient acknowledged understanding of care advice and is in agreement with plan. Please do not reply to the triage nurse through this encounter. Any subsequent communication should be directly with the patient. Reason for Disposition   Mild immunization reaction    Answer Assessment - Initial Assessment Questions  1. SYMPTOMS: \"What is the main symptom? \" (e.g., redness, swelling, pain)       Fever and chills  2. ONSET: \"When was the vaccine (shot) given? \" \"How much later did the symptoms begin? \" (e.g., hours, days ago)       12 noon on 7/29/20; vomited at 0500 on 7/30/20; Fever and chills around 1625 on 7/30/20  3. SEVERITY: \"How bad is it? \"       Mild to Moderate  4. FEVER: \"Is there a fever? \" If so, ask: \"What is it, how was it measured, and when did it start? \"      Forehead temp at 1625 was 100.7  5. IMMUNIZATIONS GIVEN: \"What shots have you recently received? \"     Tetanus combination at [address]  6. PAST REACTIONS: \"Have you reacted to immunizations before? \" If so, ask: \"What happened? \"      Denies any past reactions. 7. OTHER SYMPTOMS: \"Do you have any other symptoms? \"      Fever, chills, vomiting    Protocols used: IMMUNIZATION REACTIONS-ADULT-[place]

## 2020-10-26 RX ORDER — SIMVASTATIN 40 MG
TABLET ORAL
Qty: 90 TABLET | Refills: 1 | Status: SHIPPED | OUTPATIENT
Start: 2020-10-26 | End: 2021-04-08 | Stop reason: SDUPTHER

## 2020-11-03 RX ORDER — LISINOPRIL AND HYDROCHLOROTHIAZIDE 25; 20 MG/1; MG/1
TABLET ORAL
Qty: 90 TABLET | Refills: 1 | Status: SHIPPED | OUTPATIENT
Start: 2020-11-03 | End: 2021-04-08 | Stop reason: SDUPTHER

## 2020-12-14 ENCOUNTER — OFFICE VISIT (OUTPATIENT)
Dept: FAMILY MEDICINE CLINIC | Age: 85
End: 2020-12-14
Payer: MEDICARE

## 2020-12-14 VITALS
OXYGEN SATURATION: 97 % | SYSTOLIC BLOOD PRESSURE: 136 MMHG | HEIGHT: 65 IN | HEART RATE: 79 BPM | BODY MASS INDEX: 31.85 KG/M2 | DIASTOLIC BLOOD PRESSURE: 70 MMHG | TEMPERATURE: 97.2 F

## 2020-12-14 LAB
BILIRUBIN, POC: NORMAL
BLOOD URINE, POC: NORMAL
CLARITY, POC: CLEAR
COLOR, POC: YELLOW
GLUCOSE URINE, POC: NORMAL
KETONES, POC: NORMAL
LEUKOCYTE EST, POC: NORMAL
NITRITE, POC: NORMAL
PH, POC: 6.5
PROTEIN, POC: NORMAL
SPECIFIC GRAVITY, POC: 1.02
UROBILINOGEN, POC: 0.2

## 2020-12-14 PROCEDURE — 99214 OFFICE O/P EST MOD 30 MIN: CPT | Performed by: FAMILY MEDICINE

## 2020-12-14 PROCEDURE — 81002 URINALYSIS NONAUTO W/O SCOPE: CPT | Performed by: FAMILY MEDICINE

## 2020-12-14 RX ORDER — ALBUTEROL SULFATE 90 UG/1
1-2 AEROSOL, METERED RESPIRATORY (INHALATION) EVERY 6 HOURS PRN
Qty: 1 INHALER | Refills: 1 | Status: SHIPPED | OUTPATIENT
Start: 2020-12-14 | End: 2021-01-11 | Stop reason: SDUPTHER

## 2020-12-14 RX ORDER — SULFAMETHOXAZOLE AND TRIMETHOPRIM 400; 80 MG/1; MG/1
TABLET ORAL
COMMUNITY
Start: 2020-10-22 | End: 2021-04-12

## 2020-12-15 LAB — URINE CULTURE, ROUTINE: NORMAL

## 2020-12-21 ASSESSMENT — ENCOUNTER SYMPTOMS
SORE THROAT: 0
SHORTNESS OF BREATH: 0
SINUS PRESSURE: 1
COUGH: 1
WHEEZING: 1

## 2020-12-21 NOTE — PROGRESS NOTES
on simvastatin (Zocor). Lab Results   Component Value Date    CHOL 123 06/23/2020    TRIG 196 (H) 06/23/2020    HDL 36 (L) 06/23/2020    LDLCALC 48 06/23/2020     Lab Results   Component Value Date    ALT 24 06/23/2020    AST 23 06/23/2020          Review of Systems   Constitutional: Negative for fever. HENT: Positive for congestion and sinus pressure. Negative for ear pain and sore throat. Respiratory: Positive for cough and wheezing. Negative for shortness of breath. Cardiovascular: Negative for chest pain. Neurological: Positive for headaches. Prior to Visit Medications    Medication Sig Taking? Authorizing Provider   sulfamethoxazole-trimethoprim (BACTRIM;SEPTRA) 400-80 MG per tablet TAKE 1 TABLET BY MOUTH TWICE A DAY Yes Historical Provider, MD   albuterol sulfate HFA (VENTOLIN HFA) 108 (90 Base) MCG/ACT inhaler Inhale 1-2 puffs into the lungs every 6 hours as needed for Wheezing or Shortness of Breath Yes Bárbara Umana MD   oxyCODONE-acetaminophen (PERCOCET) 5-325 MG per tablet Take 1 tablet by mouth 3 times daily for 30 days. Yes Read JUSTIN Kent CNP   oxyCODONE-acetaminophen (PERCOCET) 5-325 MG per tablet Take 1 tablet by mouth 3 times daily for 30 days.  Yes Read JUSTIN eKnt CNP   lisinopril-hydroCHLOROthiazide (PRINZIDE;ZESTORETIC) 20-25 MG per tablet TAKE 1 TABLET DAILY Yes Bárbara Umana MD   simvastatin (ZOCOR) 40 MG tablet TAKE 1 TABLET NIGHTLY Yes Bárbara Umana MD   metoprolol tartrate (LOPRESSOR) 25 MG tablet TAKE 1 TABLET TWICE A DAY Yes Bárbara Umana MD   fluticasone (FLONASE) 50 MCG/ACT nasal spray USE 2 SPRAYS NASALLY DAILY Yes Bárbara Umana MD   Polyethylene Glycol 3350 (MIRALAX PO) Take by mouth daily Yes Historical Provider, MD   Blood Pressure KIT Check blood pressures and bring log to next visit Yes Ronen Bhakta,    aspirin 81 MG tablet Take 81 mg by mouth daily Yes Historical Provider, MD   Biotin 1 MG CAPS Take 1 capsule by mouth daily Yes Historical Provider, MD   Calcium Carbonate-Vitamin D (CALCIUM + D PO) Take 1,200 mg by mouth daily  Yes Historical Provider, MD   Multiple Vitamins-Minerals (MULTI FOR HER PO) Take  by mouth. Yes Historical Provider, MD   mirabegron (MYRBETRIQ) 50 MG TB24 Take 50 mg by mouth daily  Historical Provider, MD   oxybutynin (DITROPAN-XL) 10 MG extended release tablet Take 10 mg by mouth daily  Historical Provider, MD        Social History     Tobacco Use    Smoking status: Never Smoker    Smokeless tobacco: Never Used   Substance Use Topics    Alcohol use: Yes     Comment: rarely        Vitals:    12/14/20 1359   BP: 136/70   Pulse: 79   Temp: 97.2 °F (36.2 °C)   SpO2: 97%   Height: 5' 5\" (1.651 m)     Estimated body mass index is 31.85 kg/m² as calculated from the following:    Height as of this encounter: 5' 5\" (1.651 m). Weight as of 6/10/20: 191 lb 6.4 oz (86.8 kg). Physical Exam  Vitals signs and nursing note reviewed. Constitutional:       General: She is not in acute distress. Appearance: Normal appearance. She is well-developed. She is not diaphoretic. HENT:      Head: Normocephalic and atraumatic. Right Ear: Hearing, tympanic membrane, ear canal and external ear normal. No drainage or tenderness. There is no impacted cerumen. Left Ear: Hearing, tympanic membrane, ear canal and external ear normal. No drainage or tenderness. There is no impacted cerumen. Nose: Congestion present. No mucosal edema or rhinorrhea. Right Sinus: No maxillary sinus tenderness or frontal sinus tenderness. Left Sinus: No maxillary sinus tenderness or frontal sinus tenderness. Mouth/Throat:      Mouth: Mucous membranes are moist.      Pharynx: Oropharynx is clear. Uvula midline. No oropharyngeal exudate or posterior oropharyngeal erythema. Eyes:      Conjunctiva/sclera: Conjunctivae normal.      Pupils: Pupils are equal, round, and reactive to light. Neck:      Musculoskeletal: Neck supple. Cardiovascular:      Rate and Rhythm: Normal rate and regular rhythm. Heart sounds: Normal heart sounds. Pulmonary:      Effort: Pulmonary effort is normal. No respiratory distress. Breath sounds: Examination of the right-lower field reveals wheezing. Examination of the left-lower field reveals wheezing. Wheezing present. No rhonchi or rales. Chest:      Chest wall: No tenderness. Abdominal:      General: Bowel sounds are normal. There is no distension. Palpations: Abdomen is soft. Tenderness: There is no abdominal tenderness. Lymphadenopathy:      Cervical: No cervical adenopathy. Neurological:      Mental Status: She is alert. Results for POC orders placed in visit on 12/14/20   POCT Urinalysis no Micro   Result Value Ref Range    Color, UA yellow     Clarity, UA clear     Glucose, UA POC neg     Bilirubin, UA neg     Ketones, UA neg     Spec Grav, UA 1.020     Blood, UA POC neg     pH, UA 6.5     Protein, UA POC neg     Urobilinogen, UA 0.2     Leukocytes, UA trace     Nitrite, UA neg      Lab Review   Lab Results   Component Value Date     06/23/2020    K 4.4 06/23/2020     06/23/2020    CO2 28 06/23/2020    BUN 26 06/23/2020    CREATININE 0.8 06/23/2020    GLUCOSE 129 06/23/2020    CALCIUM 10.7 06/23/2020     Lab Results   Component Value Date    WBC 7.8 06/23/2020    HGB 12.2 06/23/2020    HCT 36.3 06/23/2020    MCV 91.7 06/23/2020     06/23/2020     Lab Results   Component Value Date    CHOL 123 06/23/2020    TRIG 196 06/23/2020    HDL 36 06/23/2020         ASSESSMENT/PLAN:  1. Essential hypertension  This problem is well controlled. Pt should continue current medication at current dose. 2. Hyperlipidemia, unspecified hyperlipidemia type  LDL and TC are well controlled. TG are high and HDL is low, suggested of metabolic syndrome. Would benefit from diet lower in refined carbohydrates. Cristina is doing well at keeping her LDL well controlled.  Will continue. 3. Recurrent UTI  Follow up with urologist as planned. Patient requested urine be tested again today as she still is symptomatic. This may be low yield as she is currently taking antibiotics. - sulfamethoxazole-trimethoprim (BACTRIM;SEPTRA) 400-80 MG per tablet; TAKE 1 TABLET BY MOUTH TWICE A DAY  - POCT Urinalysis no Micro  - Culture, Urine    4. Viral URI  Discussed the natural course of a viral illness. Recommend nasal saline prn, mucinex DM, and fluids. Prescribing albuterol as she has wheezing today. Even if this was bacterial sinusitis, she is already taking Bactrim. Patient to call office if no improvement in 1 week    - albuterol sulfate HFA (VENTOLIN HFA) 108 (90 Base) MCG/ACT inhaler; Inhale 1-2 puffs into the lungs every 6 hours as needed for Wheezing or Shortness of Breath  Dispense: 1 Inhaler; Refill: 1      No follow-ups on file. An electronic signature was used to authenticate this note.     --Nneka Luong MD on 12/21/2020 at 5:47 AM

## 2021-01-11 ENCOUNTER — TELEPHONE (OUTPATIENT)
Dept: FAMILY MEDICINE CLINIC | Age: 86
End: 2021-01-11

## 2021-01-11 DIAGNOSIS — J06.9 VIRAL URI: ICD-10-CM

## 2021-01-11 RX ORDER — ALBUTEROL SULFATE 90 UG/1
1-2 AEROSOL, METERED RESPIRATORY (INHALATION) EVERY 6 HOURS PRN
Qty: 1 INHALER | Refills: 1 | Status: SHIPPED | OUTPATIENT
Start: 2021-01-11 | End: 2021-01-11 | Stop reason: SDUPTHER

## 2021-01-11 RX ORDER — ALBUTEROL SULFATE 90 UG/1
1-2 AEROSOL, METERED RESPIRATORY (INHALATION) EVERY 6 HOURS PRN
Qty: 3 INHALER | Refills: 1 | Status: SHIPPED | OUTPATIENT
Start: 2021-01-11 | End: 2021-03-16

## 2021-01-11 NOTE — TELEPHONE ENCOUNTER
Pt is asking for the Rx for her albuterol sulfate HFA inhaler to be cancelled with [facility] and sent to [facility]. She states that the difference in price is about double.

## 2021-01-11 NOTE — TELEPHONE ENCOUNTER
Refill Request     Last Seen: 12/14/2020    Last Written: 12/14/20 1 inhaler 1 refill    Next Appointment: N/A  Future Appointments   Date Time Provider Lacy Garrett   2/3/2021 10:00 AM JUSTIN Grace - CNP [facility]       Appointment scheduled      Requested Prescriptions     Pending Prescriptions Disp Refills    albuterol sulfate HFA (VENTOLIN HFA) 108 (90 Base) MCG/ACT inhaler 1 Inhaler 1     Sig: Inhale 1-2 puffs into the lungs every 6 hours as needed for Wheezing or Shortness of Breath

## 2021-01-14 NOTE — TELEPHONE ENCOUNTER
.  Last office visit 12/14/2020     Last written 7- 180 with 1      Next office visit scheduled Visit date not found    Requested Prescriptions     Pending Prescriptions Disp Refills    metoprolol tartrate (LOPRESSOR) 25 MG tablet 180 tablet 1     Sig: TAKE 1 TABLET TWICE A DAY

## 2021-03-08 ENCOUNTER — TELEPHONE (OUTPATIENT)
Dept: FAMILY MEDICINE CLINIC | Age: 86
End: 2021-03-08

## 2021-03-08 NOTE — TELEPHONE ENCOUNTER
Pt. Reporting she received her first CoVid vaccine on 3-2-2021 and her second one is scheduled for 3-.

## 2021-03-23 RX ORDER — FLUTICASONE PROPIONATE 50 MCG
SPRAY, SUSPENSION (ML) NASAL
Qty: 48 G | Refills: 3 | Status: SHIPPED | OUTPATIENT
Start: 2021-03-23 | End: 2021-09-20

## 2021-03-23 NOTE — TELEPHONE ENCOUNTER
Refill Request     Last Seen: 12/14/2020    Last Written: 3/31/2020    Next Appointment:   Future Appointments   Date Time Provider Lacy Tami   4/15/2021  9:30 AM JUSTIN Cardona CNP [facility]             Requested Prescriptions     Pending Prescriptions Disp Refills    fluticasone (FLONASE) 50 MCG/ACT nasal spray [Pharmacy Med Name: FLUTICASONE PROP NASAL SPRAY 16GM 50MCG] 48 g 3     Sig: USE 2 SPRAYS NASALLY DAILY

## 2021-04-08 ENCOUNTER — TELEPHONE (OUTPATIENT)
Dept: FAMILY MEDICINE CLINIC | Age: 86
End: 2021-04-08

## 2021-04-08 ENCOUNTER — OFFICE VISIT (OUTPATIENT)
Dept: FAMILY MEDICINE CLINIC | Age: 86
End: 2021-04-08
Payer: MEDICARE

## 2021-04-08 VITALS
HEART RATE: 77 BPM | SYSTOLIC BLOOD PRESSURE: 134 MMHG | DIASTOLIC BLOOD PRESSURE: 70 MMHG | BODY MASS INDEX: 31.82 KG/M2 | WEIGHT: 191 LBS | HEIGHT: 65 IN | OXYGEN SATURATION: 96 % | TEMPERATURE: 97.7 F

## 2021-04-08 DIAGNOSIS — M46.96 UNSPECIFIED INFLAMMATORY SPONDYLOPATHY, LUMBAR REGION (HCC): ICD-10-CM

## 2021-04-08 DIAGNOSIS — G95.9 CERVICAL MYELOPATHY (HCC): ICD-10-CM

## 2021-04-08 DIAGNOSIS — I10 ESSENTIAL HYPERTENSION: Primary | ICD-10-CM

## 2021-04-08 DIAGNOSIS — H61.21 IMPACTED CERUMEN OF RIGHT EAR: ICD-10-CM

## 2021-04-08 DIAGNOSIS — M48.02 CERVICAL STENOSIS OF SPINAL CANAL: ICD-10-CM

## 2021-04-08 DIAGNOSIS — N32.81 OAB (OVERACTIVE BLADDER): ICD-10-CM

## 2021-04-08 DIAGNOSIS — J30.2 SEASONAL ALLERGIES: ICD-10-CM

## 2021-04-08 DIAGNOSIS — R30.0 DYSURIA: ICD-10-CM

## 2021-04-08 DIAGNOSIS — E78.5 HYPERLIPIDEMIA, UNSPECIFIED HYPERLIPIDEMIA TYPE: ICD-10-CM

## 2021-04-08 LAB
BILIRUBIN, POC: NORMAL
BLOOD URINE, POC: NORMAL
CLARITY, POC: CLEAR
COLOR, POC: YELLOW
GLUCOSE URINE, POC: NORMAL
KETONES, POC: NORMAL
LEUKOCYTE EST, POC: NORMAL
NITRITE, POC: NORMAL
PH, POC: 5.5
PROTEIN, POC: NORMAL
SPECIFIC GRAVITY, POC: 1.02
UROBILINOGEN, POC: 0.2

## 2021-04-08 PROCEDURE — 81002 URINALYSIS NONAUTO W/O SCOPE: CPT | Performed by: NURSE PRACTITIONER

## 2021-04-08 PROCEDURE — 69209 REMOVE IMPACTED EAR WAX UNI: CPT | Performed by: NURSE PRACTITIONER

## 2021-04-08 PROCEDURE — 99213 OFFICE O/P EST LOW 20 MIN: CPT | Performed by: NURSE PRACTITIONER

## 2021-04-08 RX ORDER — SIMVASTATIN 40 MG
TABLET ORAL
Qty: 90 TABLET | Refills: 1 | Status: SHIPPED | OUTPATIENT
Start: 2021-04-08 | End: 2021-09-20 | Stop reason: SDUPTHER

## 2021-04-08 RX ORDER — LISINOPRIL AND HYDROCHLOROTHIAZIDE 25; 20 MG/1; MG/1
TABLET ORAL
Qty: 90 TABLET | Refills: 1 | Status: SHIPPED | OUTPATIENT
Start: 2021-04-08 | End: 2021-09-20 | Stop reason: SDUPTHER

## 2021-04-08 RX ORDER — LEVALBUTEROL TARTRATE 45 UG/1
1 AEROSOL, METERED ORAL EVERY 4 HOURS PRN
Qty: 1 INHALER | Refills: 3 | Status: SHIPPED | OUTPATIENT
Start: 2021-04-08 | End: 2022-04-19 | Stop reason: ALTCHOICE

## 2021-04-08 ASSESSMENT — ENCOUNTER SYMPTOMS
BLURRED VISION: 0
SHORTNESS OF BREATH: 0
RHINORRHEA: 0
EYE REDNESS: 0
CONSTIPATION: 0
VOMITING: 0
COUGH: 0
ABDOMINAL DISTENTION: 0
SORE THROAT: 0
DIARRHEA: 0
EYE PAIN: 0
BACK PAIN: 1
ABDOMINAL PAIN: 0
TROUBLE SWALLOWING: 0
WHEEZING: 1

## 2021-04-08 ASSESSMENT — PATIENT HEALTH QUESTIONNAIRE - PHQ9
2. FEELING DOWN, DEPRESSED OR HOPELESS: 0
SUM OF ALL RESPONSES TO PHQ9 QUESTIONS 1 & 2: 0
1. LITTLE INTEREST OR PLEASURE IN DOING THINGS: 0
SUM OF ALL RESPONSES TO PHQ QUESTIONS 1-9: 0
SUM OF ALL RESPONSES TO PHQ QUESTIONS 1-9: 0

## 2021-04-08 NOTE — TELEPHONE ENCOUNTER
Levalbuteral Not covered by insurance. Pharmacy asking for a covered RX. Alternative:   Albuterol or Ventolin  Will need 3 month supply

## 2021-04-08 NOTE — PROGRESS NOTES
4/8/2021    This is a 80 y.o. female who presents for  Chief Complaint   Patient presents with    Discuss Medications     needs refills and [facility] sent notice, needs to change inhaler    Other     preop cancelled       Back pain: See pain management- covered related to pain management she takes Oxycodone PRN  Wheezing/coughing:Needs a refill on inhaler- her insurance will not cover Albuterol anymore  Was supposed to have botox urinary procedure but she has concerns having to have a urinary cath and her hands being numb she would not be able to care for herself and she does not want anyone having to care for her. Cervical Myelopathy: numbness in hands since 2011- hand specialist wanted to do carpal tunnel procedure on her left hand but she did not want that done. Hypertension  This is a chronic problem. The problem is controlled. Pertinent negatives include no anxiety, blurred vision, chest pain, headaches, palpitations, peripheral edema or shortness of breath. There are no associated agents to hypertension. Risk factors for coronary artery disease include dyslipidemia and obesity. Past treatments include beta blockers. The current treatment provides moderate improvement. There are no compliance problems. There is no history of hyperaldosteronism, hyperparathyroidism or a thyroid problem. Hyperlipidemia  This is a chronic problem. The current episode started more than 1 year ago. The problem is controlled. Recent lipid tests were reviewed and are low. She has no history of diabetes or liver disease. Pertinent negatives include no chest pain, myalgias or shortness of breath. Current antihyperlipidemic treatment includes statins. The current treatment provides moderate improvement of lipids. Risk factors for coronary artery disease include dyslipidemia, a sedentary lifestyle and obesity.          Past Medical History:   Diagnosis Date    Allergic rhinitis     Arthritis     Bladder dysfunction overactive    Blood transfusion     after surgery 2008, 2012    Cancer [facility])     skin cancer above rt eye excised    Cervical stenosis of spine     Chronic constipation     Constipation, other cause     due to pain medication    Dental disease     GERD (gastroesophageal reflux disease)     controlled w/diet    Hearing loss     Hyperlipidemia     Hypertension     Incontinence of urine     Lumbar stenosis with neurogenic claudication     SCC (squamous cell carcinoma)     right side of the face    Sinus congestion     Skin abnormality 8/2012    skin peeling right medial foot/heel improving with OTC antifungal cream    Sleep apnea     cpap does not use    Wears dentures     partial       Past Surgical History:   Procedure Laterality Date    APPENDECTOMY      BACK SURGERY      cervical discectomy [id number]   [facility] BACK [facility]GERY  [id number]    lumbar laminectomy L3-L5 with pedicle screws    CARPAL TUNNEL RELEASE      rt hand 1990    CERVICAL DISCECTOMY  2/15/12    C4-5 complete and radical discectomy and bilateral foraminotomy and take down of posterior longitudinal ligament    CHOLECYSTECTOMY      COLONOSCOPY      COLONOSCOPY N/A 1/20/2020    COLONOSCOPY POLYPECTOMY SNARE/COLD BIOPSY performed by Mick Sweeney MD at [facility] N/A 01/04/2019    RIGID CYSTOSCOPY     CYSTOSCOPY N/A 1/4/2019    RIGID CYSTOSCOPY performed by Elisa Shetty MD at [address] [place], COLON, DIAGNOSTIC      FOOT SURGERY      repair torn tendon with platelet infusion rt foot 2010    HYSTERECTOMY      partial 1976    JOINT REPLACEMENT      rt knee 2008    JOINT REPLACEMENT      lft knee 2014    LAMINECTOMY  9/11/12    C4-5-6 Laminectomy    LUMBAR LAMINECTOMY      1991    OTHER SURGICAL HISTORY  09/11/2012     C4-7 LAMINECTOMY WITH C4-5-6 FUSION WITH SPINAL CORD    OTHER SURGICAL HISTORY  2013    stimulator placed    OTHER SURGICAL HISTORY N/A 09/17/2014    THORACIC LAMINECTOMY FOR NASALLY DAILY 48 g 3    VENTOLIN  (90 Base) MCG/ACT inhaler INHALE 1-2 PUFFS INTO THE LUNGS EVERY 6 HOURS AS NEEDED FOR WHEEZING OR SHORTNESS OF BREATH 1 Inhaler 5    oxyCODONE-acetaminophen (PERCOCET) 5-325 MG per tablet Take 1 tablet by mouth 3 times daily for 30 days. 90 tablet 0    metoprolol tartrate (LOPRESSOR) 25 MG tablet TAKE 1 TABLET TWICE A  tablet 1    lisinopril-hydroCHLOROthiazide (PRINZIDE;ZESTORETIC) 20-25 MG per tablet TAKE 1 TABLET DAILY 90 tablet 1    simvastatin (ZOCOR) 40 MG tablet TAKE 1 TABLET NIGHTLY 90 tablet 1    Polyethylene Glycol 3350 (MIRALAX PO) Take by mouth daily      Blood Pressure KIT Check blood pressures and bring log to next visit 1 kit 0    aspirin 81 MG tablet Take 81 mg by mouth daily      Biotin 1 MG CAPS Take 1 capsule by mouth daily      Calcium Carbonate-Vitamin D (CALCIUM + D PO) Take 600 mg by mouth daily       Multiple Vitamins-Minerals (MULTI FOR HER PO) Take  by mouth.  oxyCODONE-acetaminophen (PERCOCET) 5-325 MG per tablet Take 1 tablet by mouth 3 times daily for 30 days. 90 tablet 0    oxyCODONE-acetaminophen (PERCOCET) 5-325 MG per tablet Take 1 tablet by mouth 3 times daily for 7 days. 21 tablet 0    sulfamethoxazole-trimethoprim (BACTRIM;SEPTRA) 400-80 MG per tablet TAKE 1 TABLET BY MOUTH TWICE A DAY      mirabegron (MYRBETRIQ) 50 MG TB24 Take 50 mg by mouth daily       No current facility-administered medications for this visit.         Immunization History   Administered Date(s) Administered    COVID-19, Moderna, PF, 100mcg/0.5mL 03/02/2021    COVID-19, Pfizer, PF, 30mcg/0.3mL 03/02/2021, 03/23/2021    DT (pediatric) 01/01/2008    Influenza 11/07/2016    Influenza Vaccine, unspecified formulation 10/22/2015, 10/01/2016, 11/07/2016    Influenza Virus Vaccine 11/07/2016    Influenza, High Dose (Fluzone 65 yrs and older) 10/22/2015, 10/01/2016, 10/05/2017, 10/08/2018, 10/10/2019    Influenza, High-dose, Quadv, 65 yrs +, IM (Fluzone) 10/29/2020    Pneumococcal Conjugate 13-valent ([id number]) 02/17/2016, 10/01/2016    Pneumococcal Polysaccharide ([id number]) 01/01/2005, 01/01/2014    Tdap (Boostrix, Adacel) 07/29/2020    Zoster Recombinant (Shingrix) 12/18/2019, 02/19/2020       Allergies   Allergen Reactions    Cipro Xr Hives    Clindamycin/Lincomycin Hives       Review of Systems   Constitutional: Negative for activity change, appetite change and fever. HENT: Negative for congestion, ear discharge, ear pain, postnasal drip, rhinorrhea, sore throat and trouble swallowing. Eyes: Negative for blurred vision, pain and redness. Respiratory: Positive for wheezing. Negative for cough and shortness of breath. Cardiovascular: Negative for chest pain and palpitations. Gastrointestinal: Negative for abdominal distention, abdominal pain, constipation, diarrhea and vomiting. Genitourinary: Positive for dysuria. Negative for difficulty urinating, flank pain and urgency. Musculoskeletal: Positive for back pain and gait problem (Uses a walker for ambulation). Negative for arthralgias and myalgias. Skin: Negative for rash. Neurological: Positive for numbness (BUE). Negative for dizziness, light-headedness and headaches. Psychiatric/Behavioral: Negative for behavioral problems and sleep disturbance. The patient is not nervous/anxious. /70 (Site: Left Upper Arm, Position: Sitting, Cuff Size: Large Adult)   Pulse 77   Temp 97.7 °F (36.5 °C)   Ht 5' 5\" (1.651 m)   Wt 191 lb (86.6 kg)   SpO2 96%   BMI 31.78 kg/m²     Physical Exam  Vitals signs reviewed. Constitutional:       Appearance: Normal appearance. She is well-groomed. HENT:      Head: Normocephalic. Right Ear: There is impacted cerumen.       Left Ear: Tympanic membrane, ear canal and external ear normal.      Nose: Nose normal.      Mouth/Throat:      Mouth: Mucous membranes are moist.   Eyes:      Extraocular Movements: Extraocular movements intact. Pupils: Pupils are equal, round, and reactive to light. Neck:      Musculoskeletal: Normal range of motion. Cardiovascular:      Rate and Rhythm: Normal rate and regular rhythm. Pulses: Normal pulses. Carotid pulses are 2+ on the right side and 2+ on the left side. Radial pulses are 2+ on the right side and 2+ on the left side. Posterior tibial pulses are 2+ on the right side and 2+ on the left side. Heart sounds: Normal heart sounds. Pulmonary:      Effort: Pulmonary effort is normal.      Breath sounds: Normal breath sounds. Abdominal:      General: Bowel sounds are normal.      Palpations: Abdomen is soft. Tenderness: There is no right CVA tenderness, left CVA tenderness or guarding. Musculoskeletal:      Lumbar back: She exhibits no bony tenderness. Right lower le+ Pitting Edema present. Left lower le+ Pitting Edema present. Comments: Tenderness to the spinal process, tenderness to the right facet. Gait- uses a walker. Skin:     General: Skin is warm. Capillary Refill: Capillary refill takes less than 2 seconds. Neurological:      General: No focal deficit present. Mental Status: She is alert and oriented to person, place, and time. Motor: Weakness (BUE) present. Gait: Gait abnormal.   Psychiatric:         Attention and Perception: Attention normal.         Mood and Affect: Mood normal.         Speech: Speech normal.         Behavior: Behavior normal.         Thought Content: Thought content normal.         Plan  1. Essential hypertension  Stable, no concerns. Continue medication.  - lisinopril-hydroCHLOROthiazide (PRINZIDE;ZESTORETIC) 20-25 MG per tablet; TAKE 1 TABLET DAILY  Dispense: 90 tablet; Refill: 1  - metoprolol tartrate (LOPRESSOR) 25 MG tablet; TAKE 1 TABLET TWICE A DAY  Dispense: 180 tablet; Refill: 1    2. Cervical myelopathy (HCC)  This problem is stable.  Follow up with pain

## 2021-04-11 LAB
ORGANISM: ABNORMAL
URINE CULTURE, ROUTINE: ABNORMAL

## 2021-04-12 DIAGNOSIS — N32.81 OAB (OVERACTIVE BLADDER): Primary | ICD-10-CM

## 2021-04-12 DIAGNOSIS — N39.0 RECURRENT UTI: ICD-10-CM

## 2021-04-12 RX ORDER — SULFAMETHOXAZOLE AND TRIMETHOPRIM 400; 80 MG/1; MG/1
1 TABLET ORAL 2 TIMES DAILY
Qty: 14 TABLET | Refills: 0 | Status: SHIPPED | OUTPATIENT
Start: 2021-04-12 | End: 2021-04-19

## 2021-04-12 NOTE — TELEPHONE ENCOUNTER
Could you call the patient and see if she knows what her insurance will cover? She told me Albuterol was not covered anymore through her insurance (Ventolin is albuterol just a different name of it) and I tried Xopenex and that Im told isnt covered either. If she is aware of something that is covered that would be less back and forth of ordering and it not being covered.   If she could reach out to her insurance company and find out whats covered that would be beneficial.  Thank you No

## 2021-04-19 ENCOUNTER — TELEPHONE (OUTPATIENT)
Dept: FAMILY MEDICINE CLINIC | Age: 86
End: 2021-04-19

## 2021-04-19 NOTE — TELEPHONE ENCOUNTER
Pt got a notice from [facility] that they wouldn't cover her albuterol inhaler any longer. There is another message regarding this. Paul Hardin had tried to send in an alternative but it was not covered also. I tried to call pt but kept getting a busy tone.

## 2021-06-10 PROBLEM — F11.99 OPIOID USE, UNSPECIFIED WITH UNSPECIFIED OPIOID-INDUCED DISORDER (HCC): Status: ACTIVE | Noted: 2021-06-10

## 2021-06-10 PROBLEM — F11.29 OPIOID DEPENDENCE WITH UNSPECIFIED OPIOID-INDUCED DISORDER (HCC): Status: ACTIVE | Noted: 2021-06-10

## 2021-06-10 PROBLEM — F11.20 OPIOID DEPENDENCE, UNCOMPLICATED (HCC): Status: ACTIVE | Noted: 2021-06-10

## 2021-08-30 ENCOUNTER — VIRTUAL VISIT (OUTPATIENT)
Dept: FAMILY MEDICINE CLINIC | Age: 86
End: 2021-08-30
Payer: MEDICARE

## 2021-08-30 DIAGNOSIS — R09.81 NASAL CONGESTION: Primary | ICD-10-CM

## 2021-08-30 PROCEDURE — 3288F FALL RISK ASSESSMENT DOCD: CPT | Performed by: STUDENT IN AN ORGANIZED HEALTH CARE EDUCATION/TRAINING PROGRAM

## 2021-08-30 PROCEDURE — 99213 OFFICE O/P EST LOW 20 MIN: CPT | Performed by: STUDENT IN AN ORGANIZED HEALTH CARE EDUCATION/TRAINING PROGRAM

## 2021-08-30 RX ORDER — VIBEGRON 75 MG/1
75 TABLET, FILM COATED ORAL DAILY
COMMUNITY

## 2021-08-30 SDOH — ECONOMIC STABILITY: FOOD INSECURITY: WITHIN THE PAST 12 MONTHS, YOU WORRIED THAT YOUR FOOD WOULD RUN OUT BEFORE YOU GOT MONEY TO BUY MORE.: NEVER TRUE

## 2021-08-30 SDOH — ECONOMIC STABILITY: TRANSPORTATION INSECURITY
IN THE PAST 12 MONTHS, HAS LACK OF TRANSPORTATION KEPT YOU FROM MEETINGS, WORK, OR FROM GETTING THINGS NEEDED FOR DAILY LIVING?: NO

## 2021-08-30 SDOH — ECONOMIC STABILITY: FOOD INSECURITY: WITHIN THE PAST 12 MONTHS, THE FOOD YOU BOUGHT JUST DIDN'T LAST AND YOU DIDN'T HAVE MONEY TO GET MORE.: NEVER TRUE

## 2021-08-30 SDOH — ECONOMIC STABILITY: TRANSPORTATION INSECURITY
IN THE PAST 12 MONTHS, HAS THE LACK OF TRANSPORTATION KEPT YOU FROM MEDICAL APPOINTMENTS OR FROM GETTING MEDICATIONS?: NO

## 2021-08-30 ASSESSMENT — SOCIAL DETERMINANTS OF HEALTH (SDOH): HOW HARD IS IT FOR YOU TO PAY FOR THE VERY BASICS LIKE FOOD, HOUSING, MEDICAL CARE, AND HEATING?: NOT HARD AT ALL

## 2021-08-30 NOTE — PROGRESS NOTES
motor function) (limited exam due to video visit)          [x] No gaze palsy        [] Abnormal -          Skin:        [x] No significant exanthematous lesions or discoloration noted on facial skin         [] Abnormal -            Psychiatric:       [x] Normal Affect [] Abnormal -        [x] No Hallucinations    Other pertinent observable physical exam findings:-                Gregor Oscar, was evaluated through a synchronous (real-time) audio-video encounter. The patient (or guardian if applicable) is aware that this is a billable service. Verbal consent to proceed has been obtained within the past 12 months. The visit was conducted pursuant to the emergency declaration under the [address] [place] [address], [address] [place] [address] authority and the [employer] General Act. Patient identification was verified, and a caregiver was present when appropriate. The patient was located in a state where the provider was credentialed to provide care. An electronic signature was used to authenticate this note.     --Agus Canales, DO

## 2021-09-02 ENCOUNTER — TELEPHONE (OUTPATIENT)
Dept: FAMILY MEDICINE CLINIC | Age: 86
End: 2021-09-02

## 2021-09-02 DIAGNOSIS — J01.90 ACUTE BACTERIAL SINUSITIS: Primary | ICD-10-CM

## 2021-09-02 DIAGNOSIS — B96.89 ACUTE BACTERIAL SINUSITIS: Primary | ICD-10-CM

## 2021-09-02 RX ORDER — AZITHROMYCIN 250 MG/1
250 TABLET, FILM COATED ORAL SEE ADMIN INSTRUCTIONS
Qty: 6 TABLET | Refills: 0 | Status: SHIPPED | OUTPATIENT
Start: 2021-09-02 | End: 2021-09-07

## 2021-09-02 NOTE — TELEPHONE ENCOUNTER
Pt calling to inform Dr. Terrance Duran that pt still hs her sinus infection with all the same symptoms. Pt is wondering if Dr. Terrance Duran would be able to send something to her pharmacy.  Please Advise

## 2021-09-03 ENCOUNTER — TELEPHONE (OUTPATIENT)
Dept: FAMILY MEDICINE CLINIC | Age: 86
End: 2021-09-03

## 2021-09-03 NOTE — TELEPHONE ENCOUNTER
Pt calling because Dr. Leticia Corona gave her a z-pack yesterday but pts daughter took her to get covid and her test came back positive. Pt is wondering if Dr. Leticia Corona still wants the pt to continue the medication or stop it.  Please Advise

## 2021-09-03 NOTE — TELEPHONE ENCOUNTER
Have her take Z-pack as prescribed. It is possible to have secondary infection with COVID. I will leave for Dr. Pawel Tovar to review. Follow up if no improvement or worsening.  Thanks, Blu Holbrook

## 2021-09-13 ENCOUNTER — TELEPHONE (OUTPATIENT)
Dept: FAMILY MEDICINE CLINIC | Age: 86
End: 2021-09-13

## 2021-09-13 NOTE — TELEPHONE ENCOUNTER
Pt wanted to let DR know she is going to [facility] in [facility] today for a follow up Covid test. She denies any symptoms

## 2021-09-14 ENCOUNTER — TELEPHONE (OUTPATIENT)
Dept: FAMILY MEDICINE CLINIC | Age: 86
End: 2021-09-14

## 2021-09-20 ENCOUNTER — OFFICE VISIT (OUTPATIENT)
Dept: FAMILY MEDICINE CLINIC | Age: 86
End: 2021-09-20
Payer: MEDICARE

## 2021-09-20 VITALS
TEMPERATURE: 99 F | HEART RATE: 72 BPM | BODY MASS INDEX: 35.3 KG/M2 | OXYGEN SATURATION: 98 % | DIASTOLIC BLOOD PRESSURE: 58 MMHG | HEIGHT: 61 IN | WEIGHT: 187 LBS | SYSTOLIC BLOOD PRESSURE: 124 MMHG

## 2021-09-20 DIAGNOSIS — M48.062 LUMBAR STENOSIS WITH NEUROGENIC CLAUDICATION: ICD-10-CM

## 2021-09-20 DIAGNOSIS — R73.09 ELEVATED GLUCOSE: ICD-10-CM

## 2021-09-20 DIAGNOSIS — I10 ESSENTIAL HYPERTENSION: ICD-10-CM

## 2021-09-20 DIAGNOSIS — E78.5 HYPERLIPIDEMIA, UNSPECIFIED HYPERLIPIDEMIA TYPE: ICD-10-CM

## 2021-09-20 DIAGNOSIS — J34.2 DEVIATED SEPTUM: ICD-10-CM

## 2021-09-20 DIAGNOSIS — Z00.00 ROUTINE GENERAL MEDICAL EXAMINATION AT A HEALTH CARE FACILITY: Primary | ICD-10-CM

## 2021-09-20 DIAGNOSIS — R09.81 CHRONIC NASAL CONGESTION: ICD-10-CM

## 2021-09-20 LAB
A/G RATIO: 1.6 (ref 1.1–2.2)
ALBUMIN SERPL-MCNC: 4.4 G/DL (ref 3.4–5)
ALP BLD-CCNC: 81 U/L (ref 40–129)
ALT SERPL-CCNC: 26 U/L (ref 10–40)
ANION GAP SERPL CALCULATED.3IONS-SCNC: 12 MMOL/L (ref 3–16)
AST SERPL-CCNC: 30 U/L (ref 15–37)
BASOPHILS ABSOLUTE: 0.1 K/UL (ref 0–0.2)
BASOPHILS RELATIVE PERCENT: 0.8 %
BILIRUB SERPL-MCNC: 0.4 MG/DL (ref 0–1)
BUN BLDV-MCNC: 25 MG/DL (ref 7–20)
CALCIUM SERPL-MCNC: 10.9 MG/DL (ref 8.3–10.6)
CHLORIDE BLD-SCNC: 104 MMOL/L (ref 99–110)
CHOLESTEROL, TOTAL: 125 MG/DL (ref 0–199)
CO2: 29 MMOL/L (ref 21–32)
CREAT SERPL-MCNC: 0.8 MG/DL (ref 0.6–1.2)
EOSINOPHILS ABSOLUTE: 0.3 K/UL (ref 0–0.6)
EOSINOPHILS RELATIVE PERCENT: 3.7 %
GFR AFRICAN AMERICAN: >60
GFR NON-AFRICAN AMERICAN: >60
GLOBULIN: 2.8 G/DL
GLUCOSE BLD-MCNC: 114 MG/DL (ref 70–99)
HCT VFR BLD CALC: 34 % (ref 36–48)
HDLC SERPL-MCNC: 35 MG/DL (ref 40–60)
HEMOGLOBIN: 11.5 G/DL (ref 12–16)
LDL CHOLESTEROL CALCULATED: 53 MG/DL
LYMPHOCYTES ABSOLUTE: 1.4 K/UL (ref 1–5.1)
LYMPHOCYTES RELATIVE PERCENT: 17.2 %
MCH RBC QN AUTO: 31.3 PG (ref 26–34)
MCHC RBC AUTO-ENTMCNC: 34 G/DL (ref 31–36)
MCV RBC AUTO: 92 FL (ref 80–100)
MONOCYTES ABSOLUTE: 0.6 K/UL (ref 0–1.3)
MONOCYTES RELATIVE PERCENT: 7 %
NEUTROPHILS ABSOLUTE: 5.7 K/UL (ref 1.7–7.7)
NEUTROPHILS RELATIVE PERCENT: 71.3 %
PDW BLD-RTO: 14.1 % (ref 12.4–15.4)
PLATELET # BLD: 204 K/UL (ref 135–450)
PMV BLD AUTO: 8.9 FL (ref 5–10.5)
POTASSIUM SERPL-SCNC: 4.2 MMOL/L (ref 3.5–5.1)
RBC # BLD: 3.69 M/UL (ref 4–5.2)
SODIUM BLD-SCNC: 145 MMOL/L (ref 136–145)
TOTAL PROTEIN: 7.2 G/DL (ref 6.4–8.2)
TRIGL SERPL-MCNC: 184 MG/DL (ref 0–150)
TSH REFLEX: 1 UIU/ML (ref 0.27–4.2)
VLDLC SERPL CALC-MCNC: 37 MG/DL
WBC # BLD: 7.9 K/UL (ref 4–11)

## 2021-09-20 PROCEDURE — G0439 PPPS, SUBSEQ VISIT: HCPCS | Performed by: FAMILY MEDICINE

## 2021-09-20 RX ORDER — LISINOPRIL AND HYDROCHLOROTHIAZIDE 25; 20 MG/1; MG/1
TABLET ORAL
Qty: 90 TABLET | Refills: 3 | Status: SHIPPED | OUTPATIENT
Start: 2021-09-20 | End: 2022-10-17

## 2021-09-20 RX ORDER — SIMVASTATIN 40 MG
TABLET ORAL
Qty: 90 TABLET | Refills: 3 | Status: SHIPPED | OUTPATIENT
Start: 2021-09-20 | End: 2022-10-17

## 2021-09-20 RX ORDER — MOMETASONE FUROATE 50 UG/1
2 SPRAY, METERED NASAL DAILY
Qty: 1 EACH | Refills: 11 | Status: SHIPPED | OUTPATIENT
Start: 2021-09-20 | End: 2021-09-23 | Stop reason: SDUPTHER

## 2021-09-20 RX ORDER — FLUTICASONE PROPIONATE 50 MCG
SPRAY, SUSPENSION (ML) NASAL
Qty: 48 G | Refills: 3 | Status: CANCELLED | OUTPATIENT
Start: 2021-09-20

## 2021-09-20 RX ORDER — ESTRADIOL 0.1 MG/G
CREAM VAGINAL
COMMUNITY
Start: 2021-08-19

## 2021-09-20 ASSESSMENT — LIFESTYLE VARIABLES
HOW MANY STANDARD DRINKS CONTAINING ALCOHOL DO YOU HAVE ON A TYPICAL DAY: 0
HAS A RELATIVE, FRIEND, DOCTOR, OR ANOTHER HEALTH PROFESSIONAL EXPRESSED CONCERN ABOUT YOUR DRINKING OR SUGGESTED YOU CUT DOWN: 0
HOW OFTEN DURING THE LAST YEAR HAVE YOU FOUND THAT YOU WERE NOT ABLE TO STOP DRINKING ONCE YOU HAD STARTED: 0
HOW OFTEN DO YOU HAVE SIX OR MORE DRINKS ON ONE OCCASION: 0
HOW OFTEN DURING THE LAST YEAR HAVE YOU FAILED TO DO WHAT WAS NORMALLY EXPECTED FROM YOU BECAUSE OF DRINKING: 0
HOW OFTEN DO YOU HAVE A DRINK CONTAINING ALCOHOL: 1
HOW OFTEN DURING THE LAST YEAR HAVE YOU HAD A FEELING OF GUILT OR REMORSE AFTER DRINKING: 0
AUDIT TOTAL SCORE: 1
AUDIT-C TOTAL SCORE: 1
HOW OFTEN DURING THE LAST YEAR HAVE YOU BEEN UNABLE TO REMEMBER WHAT HAPPENED THE NIGHT BEFORE BECAUSE YOU HAD BEEN DRINKING: 0
HOW OFTEN DURING THE LAST YEAR HAVE YOU NEEDED AN ALCOHOLIC DRINK FIRST THING IN THE MORNING TO GET YOURSELF GOING AFTER A NIGHT OF HEAVY DRINKING: 0
HAVE YOU OR SOMEONE ELSE BEEN INJURED AS A RESULT OF YOUR DRINKING: 0

## 2021-09-20 ASSESSMENT — PATIENT HEALTH QUESTIONNAIRE - PHQ9
1. LITTLE INTEREST OR PLEASURE IN DOING THINGS: 0
SUM OF ALL RESPONSES TO PHQ QUESTIONS 1-9: 0
2. FEELING DOWN, DEPRESSED OR HOPELESS: 0
SUM OF ALL RESPONSES TO PHQ9 QUESTIONS 1 & 2: 0
SUM OF ALL RESPONSES TO PHQ QUESTIONS 1-9: 0
SUM OF ALL RESPONSES TO PHQ QUESTIONS 1-9: 0

## 2021-09-20 NOTE — LETTER
[address]  [facility] [address]  Phone: [phone]  Fax: [phone]    Jermaine Newberry MD         September 20, 2021     Patient: Mignon Morales   YOB: 1934   Date of Visit: 9/20/2021       To Whom It May Concern: It is my medical opinion that Deny Garcia requires a disability parking placard for the following reasons:  She cannot walk without assistance from another person or the use of an assistance device (cane, crutch, prosthetic device, wheelchair, etc.). Duration of need: permanent    If you have any questions or concerns, please don't hesitate to call.     Sincerely,    significant     Jermaine Newberry MD

## 2021-09-20 NOTE — LETTER
[address]  [facility] [address]  Phone: [phone]  Fax: [phone]    Nahomi Ashley MD         September 20, 2021     Patient: Curtis Mello   YOB: 1934   Date of Visit: 9/20/2021       To Whom It May Concern: It is my medical opinion that Nathanael Andrew requires a disability parking placard for the following reasons:  She cannot walk without assistance from another person or the use of an assistance device (cane, crutch, prosthetic device, wheelchair, etc.). Duration of need: permanent    If you have any questions or concerns, please don't hesitate to call.     Sincerely,          Nahomi Ashley MD

## 2021-09-20 NOTE — PROGRESS NOTES
Medicare Annual Wellness Visit  Name: Carola Dave Date: 2021   MRN: [id number] Sex: Female   Age: 80 y.o. Ethnicity: Non- / Non    : 1934 Race: White (non-)      Roldan Allen is here for Medicare AWV and Other (Wanting 2 handicap placards)    Screenings for behavioral, psychosocial and functional/safety risks, and cognitive dysfunction are all negative except as indicated below. These results, as well as other patient data from the [address] form, are documented in Flowsheets linked to this Encounter. Allergies   Allergen Reactions    Cipro Xr Hives    Clindamycin/Lincomycin Hives       Prior to Visit Medications    Medication Sig Taking? Authorizing Provider   estradiol (ESTRACE) 0.1 MG/GM vaginal cream  Yes Historical Provider, MD   oxyCODONE-acetaminophen (PERCOCET) 5-325 MG per tablet Take 1 tablet by mouth 3 times daily for 30 days. Yes JUSTIN Patterson CNP   oxyCODONE-acetaminophen (PERCOCET) 5-325 MG per tablet Take 1 tablet by mouth 3 times daily for 30 days. Yes JUSTIN Patterson CNP   oxyCODONE-acetaminophen (PERCOCET) 5-325 MG per tablet Take 1 tablet by mouth 3 times daily for 30 days.  Yes JUSTIN Patterson CNP   Vibegron (GEMTESA) 75 MG TABS Take 75 mg by mouth daily Yes Historical Provider, MD   albuterol sulfate (PROAIR RESPICLICK) 131 (90 Base) MCG/ACT aerosol powder inhalation Inhale 1-2 puffs into the lungs every 4 hours as needed for Wheezing or Shortness of Breath Yes Yves Kumar MD   lisinopril-hydroCHLOROthiazide (PRINZIDE;ZESTORETIC) 20-25 MG per tablet TAKE 1 TABLET DAILY Yes JUSTIN Snow CNP   metoprolol tartrate (LOPRESSOR) 25 MG tablet TAKE 1 TABLET TWICE A DAY Yes JUSTIN Snow CNP   simvastatin (ZOCOR) 40 MG tablet TAKE 1 TABLET NIGHTLY Yes JUSTIN Snow CNP   levalbuterol (XOPENEX HFA) 45 MCG/ACT inhaler Inhale 1 puff into the lungs every 4 hours as needed for Wheezing Yes JUSTIN Washington CNP   fluticasone (FLONASE) 50 MCG/ACT nasal spray USE 2 SPRAYS NASALLY DAILY Yes JUSTIN Washington CNP   VENTOLIN  (90 Base) MCG/ACT inhaler INHALE 1-2 PUFFS INTO THE LUNGS EVERY 6 HOURS AS NEEDED FOR WHEEZING OR SHORTNESS OF BREATH Yes Clinton Pineda MD   Polyethylene Glycol 3350 (MIRALAX PO) Take by mouth daily Yes Historical Provider, MD   Blood Pressure KIT Check blood pressures and bring log to next visit Yes Neo Hairston,    aspirin 81 MG tablet Take 81 mg by mouth daily Yes Historical Provider, MD   Biotin 1 MG CAPS Take 1 capsule by mouth daily Yes Historical Provider, MD   Calcium Carbonate-Vitamin D (CALCIUM + D PO) Take 600 mg by mouth daily  Yes Historical Provider, MD   Multiple Vitamins-Minerals (MULTI FOR HER PO) Take  by mouth. Yes Historical Provider, MD   oxyCODONE-acetaminophen (PERCOCET) 5-325 MG per tablet Take 1 tablet by mouth 3 times daily for 7 days.   JUSTIN Drake CNP       Past Medical History:   Diagnosis Date    Allergic rhinitis     Arthritis     Bladder dysfunction     overactive    Blood transfusion     after surgery 2008, 2012    Cancer [facility])     skin cancer above rt eye excised    Cervical stenosis of spine     Chronic constipation     Constipation, other cause     due to pain medication    Dental disease     GERD (gastroesophageal reflux disease)     controlled w/diet    Hearing loss     Hyperlipidemia     Hypertension     Incontinence of urine     Lumbar stenosis with neurogenic claudication     SCC (squamous cell carcinoma)     right side of the face    Sinus congestion     Skin abnormality 8/2012    skin peeling right medial foot/heel improving with OTC antifungal cream    Sleep apnea     cpap does not use    Wears dentures     partial       Past Surgical History:   Procedure Laterality Date    APPENDECTOMY      BACK SURGERY      cervical discectomy 0996;6650   [facility] BACK SURGERY  [id number]    lumbar laminectomy (Gastroenterology)    Wt Readings from Last 3 Encounters:   09/20/21 187 lb (84.8 kg)   04/08/21 191 lb (86.6 kg)   06/10/20 191 lb 6.4 oz (86.8 kg)     Vitals:    09/20/21 1034   BP: (!) 124/58   Site: Left Upper Arm   Position: Sitting   Cuff Size: Small Adult   Pulse: 72   Temp: 99 °F (37.2 °C)   TempSrc: Oral   SpO2: 98%   Weight: 187 lb (84.8 kg)   Height: 5' 1\" (1.549 m)     Body mass index is 35.33 kg/m². Based upon direct observation of the patient, evaluation of cognition reveals recent and remote memory intact. Patient's complete Health Risk Assessment and screening values have been reviewed and are found in Flowsheets. The following problems were reviewed today and where indicated follow up appointments were made and/or referrals ordered. Positive Risk Factor Screenings with Interventions:           Health Habits/Nutrition:  Health Habits/Nutrition  Do you exercise for at least 20 minutes 2-3 times per week?: Yes  Have you lost any weight without trying in the past 3 months?: No  Do you eat only one meal per day?: No  Have you seen the dentist within the past year?: Yes  Body mass index: (!) 35.33  Health Habits/Nutrition Interventions:  · Inadequate physical activity:  patient is not ready to increase his/her physical activity level at this time      ADL:  ADLs  In the past 7 days, did you need help from others to perform any of the following everyday activities? Eating, dressing, grooming, bathing, toileting, or walking/balance?: (!) Walking/Balance (uses walker)  In the past 7 days, did you need help from others to take care of any of the following?  Laundry, housekeeping, banking/finances, shopping, telephone use, food preparation, transportation, or taking medications?: (!) Transportation  ADL Interventions:  · Patient declines any further evaluation/treatment for this issue    Personalized Preventive Plan   Current Health Maintenance Status  Immunization History   Administered Date(s) Administered    COVID-19, Moderna, PF, 100mcg/0.5mL 03/02/2021    COVID-19, Pfizer, PF, 30mcg/0.3mL 03/02/2021, 03/23/2021    DT (pediatric) 01/01/2008    Influenza 11/07/2016    Influenza Vaccine, unspecified formulation 10/22/2015, 10/01/2016, 11/07/2016    Influenza Virus Vaccine 11/07/2016    Influenza, High Dose (Fluzone 65 yrs and older) 10/22/2015, 10/01/2016, 10/05/2017, 10/08/2018, 10/10/2019    Influenza, High-dose, Quadv, 65 yrs +, IM (Fluzone) 10/29/2020    Pneumococcal Conjugate 13-valent ([id number]) 02/17/2016, 10/01/2016    Pneumococcal Polysaccharide ([id number]) 01/01/2005, 01/01/2014    Tdap (Boostrix, Adacel) 07/29/2020    Zoster Recombinant (Shingrix) 12/18/2019, 02/19/2020        Health Maintenance   Topic Date Due    Annual Wellness Visit (AWV)  06/20/2021    Lipid screen  06/23/2021    Potassium monitoring  06/23/2021    Creatinine monitoring  06/23/2021    Flu vaccine (1) 09/01/2021    DTaP/Tdap/Td vaccine (3 - Td or Tdap) 07/29/2030    Shingles Vaccine  Completed    Pneumococcal 65+ years Vaccine  Completed    COVID-19 Vaccine  Completed    Hepatitis A vaccine  Aged Out    Hepatitis B vaccine  Aged Out    Hib vaccine  Aged Out    Meningococcal (ACWY) vaccine  Aged Out     Recommendations for [employer] Due: see orders and patient instructions/AVS.  . Recommended screening schedule for the next 5-10 years is provided to the patient in written form: see Patient Instructions/AVS.    Margi ALBA LPN, 0/07/1721, performed the documented evaluation under the direct supervision of the attending physician. This encounter was performed under my, Julia Emanuel, direct supervision, 9/20/2021. Attending Supervising [de-identified] Attestation Statement  The patient is a 80 y.o. female. I have performed a history and physical examination of the patient. I reviewed the patient's Past Medical History, Past Surgical History, Medications, and Allergies. Physical Exam:  Vitals:    09/20/21 1034   BP: (!) 124/58   Site: Left Upper Arm   Position: Sitting   Cuff Size: Small Adult   Pulse: 72   Temp: 99 °F (37.2 °C)   TempSrc: Oral   SpO2: 98%   Weight: 187 lb (84.8 kg)   Height: 5' 1\" (1.549 m)       Physical Exam  Vitals and nursing note reviewed. Constitutional:       General: She is not in acute distress. Appearance: She is well-developed. She is not diaphoretic. HENT:      Head: Normocephalic and atraumatic. Eyes:      General: No scleral icterus. Conjunctiva/sclera: Conjunctivae normal.   Cardiovascular:      Rate and Rhythm: Normal rate and regular rhythm. Heart sounds: Normal heart sounds. No murmur heard. No friction rub. No gallop. Pulmonary:      Effort: Pulmonary effort is normal. No respiratory distress. Breath sounds: Normal breath sounds. No wheezing or rales. Abdominal:      General: Bowel sounds are normal. There is no distension. Palpations: Abdomen is soft. There is no mass. Tenderness: There is no abdominal tenderness. There is no guarding or rebound. Neurological:      Mental Status: She is alert. Impression/Plan  1. Routine general medical examination at a health care facility  Plan per above    2. Essential hypertension  This problem is well controlled. Pt should continue current medication at current dose. - lisinopril-hydroCHLOROthiazide (PRINZIDE;ZESTORETIC) 20-25 MG per tablet; TAKE 1 TABLET DAILY  Dispense: 90 tablet; Refill: 3  - metoprolol tartrate (LOPRESSOR) 25 MG tablet; TAKE 1 TABLET TWICE A DAY  Dispense: 180 tablet; Refill: 3  - CBC Auto Differential  - Comprehensive Metabolic Panel  - TSH with Reflex    3. Hyperlipidemia, unspecified hyperlipidemia type  This problem is stable. Continue current meds until lab results obtain, will adjust dosing if necessary at that time. - simvastatin (ZOCOR) 40 MG tablet; TAKE 1 TABLET NIGHTLY  Dispense: 90 tablet;  Refill: 3  - TSH with

## 2021-09-20 NOTE — PATIENT INSTRUCTIONS
Personalized Preventive Plan for Curtis Mello - 9/20/2021  Medicare offers a range of preventive health benefits. Some of the tests and screenings are paid in full while other may be subject to a deductible, co-insurance, and/or copay. Some of these benefits include a comprehensive review of your medical history including lifestyle, illnesses that may run in your family, and various assessments and screenings as appropriate. After reviewing your medical record and screening and assessments performed today your provider may have ordered immunizations, labs, imaging, and/or referrals for you. A list of these orders (if applicable) as well as your Preventive Care list are included within your After Visit Summary for your review. Other Preventive Recommendations:    · A preventive eye exam performed by an eye specialist is recommended every 1-2 years to screen for glaucoma; cataracts, macular degeneration, and other eye disorders. · A preventive dental visit is recommended every 6 months. · Try to get at least 150 minutes of exercise per week or 10,000 steps per day on a pedometer . · Order or download the FREE \"Exercise & Physical Activity: Your Everyday Guide\" from [employer]. Call 8-686.773.5935 or search [employer] online. · You need 0892-9099 mg of calcium and 8179-5875 IU of vitamin D per day. It is possible to meet your calcium requirement with diet alone, but a vitamin D supplement is usually necessary to meet this goal.  · When exposed to the sun, use a sunscreen that protects against both UVA and UVB radiation with an SPF of 30 or greater. Reapply every 2 to 3 hours or after sweating, drying off with a towel, or swimming. · Always wear a seat belt when traveling in a car. Always wear a helmet when riding a bicycle or motorcycle.
left normal/right normal

## 2021-09-21 LAB
ESTIMATED AVERAGE GLUCOSE: 114 MG/DL
HBA1C MFR BLD: 5.6 %

## 2021-09-22 DIAGNOSIS — E83.52 HYPERCALCEMIA: Primary | ICD-10-CM

## 2021-09-23 DIAGNOSIS — R09.81 CHRONIC NASAL CONGESTION: ICD-10-CM

## 2021-09-23 NOTE — TELEPHONE ENCOUNTER
----- Message from Daniel Hanks sent at 9/23/2021 12:24 PM EDT -----  Subject: Medication Problem    QUESTIONS  Name of Medication? mometasone (NASONEX) 50 MCG/ACT nasal spray  Patient-reported dosage and instructions? 2 sprays per day  What question or problem do you have with the medication? [employer]   needs a pre-authorization for this med. Please call [phone]. Preferred Pharmacy? [facility], [address]  Pharmacy phone number (if available)? [phone]  Additional Information for Provider? [employer] needs a   pre-authorization for this med. Please call [phone].  ---------------------------------------------------------------------------  --------------  Luz Grass INFO  What is the best way for the office to contact you? OK to leave message on   voicemail  Preferred Call Back Phone Number? [phone]  ---------------------------------------------------------------------------  --------------  SCRIPT ANSWERS  Relationship to Patient?  Self

## 2021-09-24 RX ORDER — MOMETASONE FUROATE 50 UG/1
2 SPRAY, METERED NASAL DAILY
Qty: 1 EACH | Refills: 11 | Status: SHIPPED | OUTPATIENT
Start: 2021-09-24

## 2021-09-28 ENCOUNTER — TELEPHONE (OUTPATIENT)
Dept: FAMILY MEDICINE CLINIC | Age: 86
End: 2021-09-28

## 2021-09-28 NOTE — TELEPHONE ENCOUNTER
Mometasone is not covered by insurance. Pharmacy asking for Alternative.   Flunisolide or Fluticasone

## 2021-09-30 NOTE — TELEPHONE ENCOUNTER
Spoke with Fidel Barba, pharmacist at [facility]. They do not accept copay assistance. Will need to be sent to local pharmacy.

## 2021-09-30 NOTE — TELEPHONE ENCOUNTER
Patient states that she has just received a refill of the Nasal spray for 3 months. So she does not need it sent into local pharmacy at this time.

## 2021-10-15 ENCOUNTER — NURSE ONLY (OUTPATIENT)
Dept: FAMILY MEDICINE CLINIC | Age: 86
End: 2021-10-15
Payer: MEDICARE

## 2021-10-15 DIAGNOSIS — Z23 NEED FOR INFLUENZA VACCINATION: Primary | ICD-10-CM

## 2021-10-15 PROCEDURE — G0008 ADMIN INFLUENZA VIRUS VAC: HCPCS | Performed by: FAMILY MEDICINE

## 2021-10-15 PROCEDURE — 90694 VACC AIIV4 NO PRSRV 0.5ML IM: CPT | Performed by: FAMILY MEDICINE

## 2022-01-02 DIAGNOSIS — J06.9 VIRAL URI: ICD-10-CM

## 2022-01-02 RX ORDER — ALBUTEROL SULFATE 90 UG/1
AEROSOL, METERED RESPIRATORY (INHALATION)
Qty: 18 EACH | Refills: 5 | Status: SHIPPED | OUTPATIENT
Start: 2022-01-02

## 2022-01-02 NOTE — TELEPHONE ENCOUNTER
.  Refill Request     Last Seen: Last Seen Department: 9/20/2021  Last Seen by PCP: 9/20/2021    Last Written: 4/27/21 3 with 3     Next Appointment:   Future Appointments   Date Time Provider Lacy Garrett   3/8/2022  9:00 AM JUSTIN Haskins - CNP [facility]   9/22/2022  9:00 AM MD OWEN Guzman  [place] - [facility]       Requested Prescriptions     Pending Prescriptions Disp Refills    albuterol sulfate  (90 Base) MCG/ACT inhaler [Pharmacy Med Name: ALBUTEROL HFA (PROAIR) INHALER] 18 each 5     Sig: INHALE 1-2 PUFFS INTO THE LUNGS EVERY 6 HOURS AS NEEDED FOR WHEEZING OR SHORTNESS OF BREATH

## 2022-01-06 NOTE — TELEPHONE ENCOUNTER
Patient states her insurance no longer covers the Albuterol inhaler at $5 but that she could get the generic Proventil for $5 if a rx is sent in, patient would for a rx to be sent in

## 2022-01-07 RX ORDER — ALBUTEROL SULFATE 90 UG/1
2 AEROSOL, METERED RESPIRATORY (INHALATION) EVERY 6 HOURS PRN
Qty: 18 G | Refills: 3 | Status: SHIPPED | OUTPATIENT
Start: 2022-01-07 | End: 2022-01-10 | Stop reason: SDUPTHER

## 2022-01-10 ENCOUNTER — TELEPHONE (OUTPATIENT)
Dept: FAMILY MEDICINE CLINIC | Age: 87
End: 2022-01-10

## 2022-01-10 RX ORDER — ALBUTEROL SULFATE 90 UG/1
2 AEROSOL, METERED RESPIRATORY (INHALATION) EVERY 6 HOURS PRN
Qty: 3 EACH | Refills: 3 | Status: SHIPPED | OUTPATIENT
Start: 2022-01-10 | End: 2022-04-19 | Stop reason: ALTCHOICE

## 2022-01-10 NOTE — TELEPHONE ENCOUNTER
----- Message from Erinsindy Rubio sent at 1/10/2022  4:59 PM EST -----  Subject: Refill Request    QUESTIONS  Name of Medication? Other - Proventil  Patient-reported dosage and instructions? n/a  How many days do you have left? 0  Preferred Pharmacy? [address]  phone number (if available)? [phone]  Additional Information for Provider? albuterol no longer covered,   requesting generic Proventil.  ---------------------------------------------------------------------------  --------------  CALL BACK INFO  What is the best way for the office to contact you? OK to leave message on   voicemail  Preferred Call Back Phone Number?  [phone]

## 2022-01-14 ENCOUNTER — TELEPHONE (OUTPATIENT)
Dept: FAMILY MEDICINE CLINIC | Age: 87
End: 2022-01-14

## 2022-03-10 RX ORDER — FLUTICASONE PROPIONATE 50 MCG
SPRAY, SUSPENSION (ML) NASAL
Qty: 48 G | Refills: 3 | Status: SHIPPED | OUTPATIENT
Start: 2022-03-10 | End: 2022-03-23

## 2022-03-10 NOTE — TELEPHONE ENCOUNTER
Last office visit 9/20/2021     Last written 3- x 3 rfills    Next office visit scheduled Not scheduled    Requested Prescriptions     Pending Prescriptions Disp Refills    fluticasone (FLONASE) 50 MCG/ACT nasal spray 48 g 3     Sig: USE 2 SPRAYS NASALLY DAILY

## 2022-03-23 RX ORDER — FLUTICASONE PROPIONATE 50 MCG
SPRAY, SUSPENSION (ML) NASAL
Qty: 48 G | Refills: 3 | Status: SHIPPED | OUTPATIENT
Start: 2022-03-23

## 2022-04-09 ENCOUNTER — HOSPITAL ENCOUNTER (EMERGENCY)
Age: 87
Discharge: HOME | End: 2022-04-09
Payer: MEDICARE

## 2022-04-09 ENCOUNTER — APPOINTMENT (OUTPATIENT)
Dept: GENERAL RADIOLOGY | Age: 87
End: 2022-04-09
Payer: MEDICARE

## 2022-04-09 VITALS
DIASTOLIC BLOOD PRESSURE: 65 MMHG | OXYGEN SATURATION: 97 % | BODY MASS INDEX: 34.78 KG/M2 | WEIGHT: 189 LBS | HEIGHT: 62 IN | RESPIRATION RATE: 18 BRPM | HEART RATE: 75 BPM | SYSTOLIC BLOOD PRESSURE: 137 MMHG | TEMPERATURE: 97.8 F

## 2022-04-09 DIAGNOSIS — M25.361 RIGHT KNEE BUCKLING: Primary | ICD-10-CM

## 2022-04-09 DIAGNOSIS — M25.561 ACUTE PAIN OF RIGHT KNEE: ICD-10-CM

## 2022-04-09 PROCEDURE — 99283 EMERGENCY DEPT VISIT LOW MDM: CPT

## 2022-04-09 PROCEDURE — 6370000000 HC RX 637 (ALT 250 FOR IP): Performed by: NURSE PRACTITIONER

## 2022-04-09 PROCEDURE — 73562 X-RAY EXAM OF KNEE 3: CPT

## 2022-04-09 RX ORDER — NAPROXEN 250 MG/1
250 TABLET ORAL ONCE
Status: COMPLETED | OUTPATIENT
Start: 2022-04-09 | End: 2022-04-09

## 2022-04-09 RX ADMIN — NAPROXEN 250 MG: 250 TABLET ORAL at 13:14

## 2022-04-09 ASSESSMENT — ENCOUNTER SYMPTOMS
COLOR CHANGE: 0
RHINORRHEA: 0
SORE THROAT: 0
ABDOMINAL PAIN: 0
FACIAL SWELLING: 0
SHORTNESS OF BREATH: 0

## 2022-04-09 ASSESSMENT — PAIN DESCRIPTION - PAIN TYPE: TYPE: ACUTE PAIN

## 2022-04-09 ASSESSMENT — PAIN - FUNCTIONAL ASSESSMENT: PAIN_FUNCTIONAL_ASSESSMENT: 0-10

## 2022-04-09 ASSESSMENT — PAIN SCALES - GENERAL
PAINLEVEL_OUTOF10: 6
PAINLEVEL_OUTOF10: 6

## 2022-04-09 ASSESSMENT — PAIN DESCRIPTION - LOCATION: LOCATION: KNEE

## 2022-04-09 ASSESSMENT — PAIN DESCRIPTION - ORIENTATION: ORIENTATION: RIGHT

## 2022-04-09 NOTE — ED PROVIDER NOTES
Evaluated by [id number] [facility] Provider          [facility]  EMERGENCY DEPARTMENT ENCOUNTER        Pt Name: Jose Tellez  MRN: [id number]  Birthdate 1934  Dateof evaluation: 4/9/2022  Provider: Adis Gonzalez APRN - CNP  PCP: Kaylie Perry MD  ED Attending: No att. providers found    [facility]       Chief Complaint   Patient presents with    Fall     fell 2 days ago. reports right knee. reports leg buckled, landed on knee. HISTORY OF PRESENTILLNESS   (Location/Symptom, Timing/Onset, Context/Setting, Quality, Duration, Modifying Factors, Severity)  Note limiting factors. Jose Tellez is a 80 y.o. female for right knee pain. Onset was 2 days ago. Context includes patient reports that she fell onto her right knee 2 days ago. She denies hitting her head or having any other injury. Patient reports that her knee buckled and gave out. Patient states this has been happening more frequently. Patient denies dizziness or lightheadedness. Alleviating factors include nothing. Aggravating factors include nothing. Pain is 6/10. Nothing has been used for pain today. Nursing Notes were all reviewed and agreed with or any disagreements were addressed  in the HPI. REVIEW OF SYSTEMS    (2-9 systems for level 4, 10 or more for level 5)     Review of Systems   Constitutional: Negative for activity change, appetite change and fever. HENT: Negative for congestion, facial swelling, rhinorrhea and sore throat. Eyes: Negative for visual disturbance. Respiratory: Negative for shortness of breath. Cardiovascular: Negative for chest pain. Gastrointestinal: Negative for abdominal pain. Genitourinary: Negative for difficulty urinating. Musculoskeletal: Negative for arthralgias and myalgias. Right knee pain   Skin: Negative for color change and rash. Neurological: Negative for dizziness and light-headedness. Psychiatric/Behavioral: Negative for agitation. All other systems reviewed and are negative. Positives and Pertinent negatives as per HPI. Except as noted above in the ROS, all other systems were reviewed and negative.        PAST MEDICAL HISTORY     Past Medical History:   Diagnosis Date    Allergic rhinitis     Arthritis     Bladder dysfunction     overactive    Blood transfusion     after surgery 2008, 2012    Cancer [facility])     skin cancer above rt eye excised    Cervical stenosis of spine     Chronic constipation     Constipation, other cause     due to pain medication    Dental disease     GERD (gastroesophageal reflux disease)     controlled w/diet    Hearing loss     Hyperlipidemia     Hypertension     Incontinence of urine     Lumbar stenosis with neurogenic claudication     SCC (squamous cell carcinoma)     right side of the face    Sinus congestion     Skin abnormality 8/2012    skin peeling right medial foot/heel improving with OTC antifungal cream    Sleep apnea     cpap does not use    Wears dentures     partial         SURGICAL HISTORY       Past Surgical History:   Procedure Laterality Date    APPENDECTOMY      BACK SURGERY      cervical discectomy [id number]   [facility] BACK SURGERY  [id number]    lumbar laminectomy L3-L5 with pedicle screws    CARPAL TUNNEL RELEASE      rt hand 1990    CERVICAL DISCECTOMY  2/15/12    C4-5 complete and radical discectomy and bilateral foraminotomy and take down of posterior longitudinal ligament    CHOLECYSTECTOMY      COLONOSCOPY      COLONOSCOPY N/A 1/20/2020    COLONOSCOPY POLYPECTOMY SNARE/COLD BIOPSY performed by Jose Bermudez MD at [address] [place] [address] N/A 01/04/2019    RIGID CYSTOSCOPY     CYSTOSCOPY N/A 1/4/2019    RIGID CYSTOSCOPY performed by Cameron Franklin MD at [place] [address], COLON, DIAGNOSTIC      FOOT SURGERY      repair torn tendon with platelet infusion rt foot 2010    HYSTERECTOMY      partial 1976    JOINT REPLACEMENT      rt knee 2008  JOINT REPLACEMENT      lft knee 2014    LAMINECTOMY  9/11/12    C4-5-6 Laminectomy    LUMBAR LAMINECTOMY      1991    OTHER SURGICAL HISTORY  09/11/2012     C4-7 LAMINECTOMY WITH C4-5-6 FUSION WITH SPINAL CORD    OTHER SURGICAL HISTORY  2013    stimulator placed    OTHER SURGICAL HISTORY N/A 09/17/2014    THORACIC LAMINECTOMY FOR REMOVALOF SPINAL CORD STIMULATOR    PARTIAL HYSTERECTOMY      SKIN BIOPSY      cancer above rt eye    SPINAL FUSION  2/15/12    C4-5 interbody fusion, anterior cervical plate, machined interbody spacer    SPINAL FUSION  9/11/12    C4-5-6 fusion, local bone graft, spinal cord monitoring    TONSILLECTOMY           CURRENT MEDICATIONS       Discharge Medication List as of 4/9/2022  1:17 PM      CONTINUE these medications which have NOT CHANGED    Details   fluticasone (FLONASE) 50 MCG/ACT nasal spray USE 2 SPRAYS NASALLY DAILY, Disp-48 g, R-3YOUR PATIENT HAS REQUESTED A REFILL OF THIS MEDICATION, PREVIOUSLY AUTHORIZED BY ANOTHER PRESCRIBER. Normal      !! oxyCODONE-acetaminophen (PERCOCET) 5-325 MG per tablet Take 1 tablet by mouth 3 times daily for 30 days. , Disp-90 tablet, R-0Normal      !! oxyCODONE-acetaminophen (PERCOCET) 5-325 MG per tablet Take 1 tablet by mouth 3 times daily for 30 days. , Disp-90 tablet, R-0Normal      !! oxyCODONE-acetaminophen (PERCOCET) 5-325 MG per tablet Take 1 tablet by mouth 3 times daily for 30 days. , Disp-90 tablet, R-0Normal      !! albuterol sulfate HFA (PROVENTIL HFA) 108 (90 Base) MCG/ACT inhaler Inhale 2 puffs into the lungs every 6 hours as needed for Wheezing, Disp-3 each, R-3Normal      !! albuterol sulfate  (90 Base) MCG/ACT inhaler INHALE 1-2 PUFFS INTO THE LUNGS EVERY 6 HOURS AS NEEDED FOR WHEEZING OR SHORTNESS OF BREATH, Disp-18 each, R-5Normal      mometasone (NASONEX) 50 MCG/ACT nasal spray 2 sprays by Nasal route daily, Nasal, DAILY Starting Fri 9/24/2021, Disp-1 each, R-11, Normal      estradiol (ESTRACE) 0.1 MG/GM vaginal cream Historical Med      lisinopril-hydroCHLOROthiazide (PRINZIDE;ZESTORETIC) 20-25 MG per tablet TAKE 1 TABLET DAILY, Disp-90 tablet, R-3Normal      metoprolol tartrate (LOPRESSOR) 25 MG tablet TAKE 1 TABLET TWICE A DAY, Disp-180 tablet, R-3Normal      simvastatin (ZOCOR) 40 MG tablet TAKE 1 TABLET NIGHTLY, Disp-90 tablet, R-3Normal      Handicap Placard MISC Starting Mon 9/20/2021, Disp-2 each, R-0, Print      Vibegron (GEMTESA) 75 MG TABS Take 75 mg by mouth dailyHistorical Med      albuterol sulfate (PROAIR RESPICLICK) 026 (90 Base) MCG/ACT aerosol powder inhalation Inhale 1-2 puffs into the lungs every 4 hours as needed for Wheezing or Shortness of Breath, Disp-3 Inhaler, R-3Normal      levalbuterol (XOPENEX HFA) 45 MCG/ACT inhaler Inhale 1 puff into the lungs every 4 hours as needed for Wheezing, Disp-1 Inhaler, R-3Normal      Polyethylene Glycol 3350 (MIRALAX PO) Take by mouth dailyHistorical Med      Blood Pressure KIT Disp-1 kit, R-0, NormalCheck blood pressures and bring log to next visit      aspirin 81 MG tablet Take 81 mg by mouth daily      Biotin 1 MG CAPS Take 1 capsule by mouth daily      Calcium Carbonate-Vitamin D (CALCIUM + D PO) Take 600 mg by mouth daily Historical Med      Multiple Vitamins-Minerals (MULTI FOR HER PO) Take  by mouth. !! - Potential duplicate medications found. Please discuss with provider. ALLERGIES     Cipro xr and Clindamycin/lincomycin    FAMILY HISTORY       Family History   Problem Relation Age of Onset    Hypertension Mother     Cancer Sister     Diabetes Brother     Heart Attack Brother           SOCIAL HISTORY       Social History     Socioeconomic History    Marital status:       Spouse name: None    Number of children: None    Years of education: None    Highest education level: None   Occupational History    None   Tobacco Use    Smoking status: Never Smoker    Smokeless tobacco: Never Used   Vaping Use    Vaping Use: Never used Substance and Sexual Activity    Alcohol use: Yes     Comment: rarely    Drug use: No    Sexual activity: Not Currently   Other Topics Concern    None   Social History Narrative    None     Social Determinants of Health     Financial Resource Strain: Low Risk     Difficulty of Paying Living Expenses: Not hard at all   Food Insecurity: No Food Insecurity    Worried About Running Out of Food in the Last Year: Never true    Purnima of Food in the Last Year: Never true   Transportation Needs: No Transportation Needs    Lack of Transportation (Medical): No    Lack of Transportation (Non-Medical):  No   Physical Activity:     Days of Exercise per Week: Not on file    Minutes of Exercise per Session: Not on file   Stress:     Feeling of Stress : Not on file   Social Connections:     Frequency of Communication with Friends and Family: Not on file    Frequency of Social Gatherings with Friends and Family: Not on file    Attends [employer] Services: Not on file    Active Member of [address] [place] [address] or Organizations: Not on file    Attends Club or Organization Meetings: Not on file    Marital Status: Not on file   Intimate Partner Violence:     Fear of Current or Ex-Partner: Not on file    Emotionally Abused: Not on file    Physically Abused: Not on file    Sexually Abused: Not on file   Housing Stability:     Unable to Pay for Housing in the Last Year: Not on file    Number of [place] in the Last Year: Not on file    Unstable Housing in the Last Year: Not on file       SCREENINGS    [place] Coma Scale  Eye Opening: Spontaneous  Best Verbal Response: Oriented  Best Motor Response: Obeys commands  [place] Coma Scale Score: 15        PHYSICAL EXAM  (up to 7 for level 4, 8 or more for level 5)     ED Triage Vitals [04/09/22 1152]   BP Temp Temp Source Pulse Resp SpO2 Height Weight   137/65 97.8 °F (36.6 °C) Skin 75 18 97 % 5' 2\" (1.575 m) 189 lb (85.7 kg)       Physical Exam  Constitutional:       Appearance: She is well-developed. HENT:      Head: Normocephalic and atraumatic. Cardiovascular:      Rate and Rhythm: Normal rate. Pulmonary:      Effort: Pulmonary effort is normal. No respiratory distress. Abdominal:      General: There is no distension. Palpations: Abdomen is soft. Musculoskeletal:         General: Tenderness present. No swelling, deformity or signs of injury. Cervical back: Normal range of motion. Comments: Decreased range of motion to the right knee due to pain elicited with movement. Sensation strength and pulse intact right foot. Tenderness diffusely with palpation to the right knee. No pain tenderness or warmth noted to the right calf. Skin:     General: Skin is warm and dry. Neurological:      Mental Status: She is alert and oriented to person, place, and time. DIAGNOSTIC RESULTS   LABS:    Labs Reviewed - No data to display    All other labs werewithin normal range or not returned as of this dictation. EKG: All EKG's are interpreted by the Emergency Department Physician who either signs or Co-signs this chart in the absence of a cardiologist.  Please see their note for interpretation of EKG. RADIOLOGY:     Right knee x-ray interpreted by radiologist for status post right knee arthroplasty. No acute findings. Interpretation per the Radiologist below, if available at the time of this note:    XR KNEE RIGHT (3 VIEWS)   Final Result   Status post right knee arthroplasty. No acute findings. XR KNEE RIGHT (3 VIEWS)    Result Date: 4/9/2022  EXAMINATION: THREE XRAY VIEWS OF THE RIGHT KNEE 4/9/2022 11:56 am COMPARISON: None. HISTORY: ORDERING SYSTEM PROVIDED HISTORY: fall TECHNOLOGIST PROVIDED HISTORY: Reason for exam:->fall Reason for Exam: pt states her knee buckled and she fell, landed on her knee, states this has happened before, pain in anterior aspect of knee FINDINGS: No acute fracture or dislocation.   Status post right knee arthroplasty with no evidence of hardware complication. No significant joint effusion. Enthesopathy at the tendinous insertions on the patella. Status post right knee arthroplasty. No acute findings. PROCEDURES   Unless otherwise noted below, none     Procedures     CRITICAL CARE TIME   N/A    CONSULTS:  None      EMERGENCYDEPARTMENT COURSE and DIFFERENTIAL DIAGNOSIS/MDM:   Vitals:    Vitals:    04/09/22 1152   BP: 137/65   Pulse: 75   Resp: 18   Temp: 97.8 °F (36.6 °C)   TempSrc: Skin   SpO2: 97%   Weight: 189 lb (85.7 kg)   Height: 5' 2\" (1.575 m)       Patient was given the following medications:  Medications   naproxen (NAPROSYN) tablet 250 mg (250 mg Oral Given 4/9/22 1314)       Patient was seen and evaluated by myself. Patient here for concerns for right knee pain. Patient reports that her right knee buckled a few days ago when she fell landing on it. She denies any her head or having any other injuries. On exam she is awake and alert hemodynamically stable nontoxic in appearance. X-ray of her right knee was negative for any acute findings. Patient was provided with a dose of Naprosyn in the ED. Patient states that she does ambulate with a walker at home. She was given a knee immobilizer and orthopedic and physical therapy referral.  She was encouraged to follow-up. She was also encouraged to return to the ED for worsening symptoms. Patient was ultimately discharged with all questions answered. The patient tolerated their visit well. I have evaluated this patient. My supervising physician was available for consultation. The patient and / or the family were informed of the results of any tests, a time was given to answer questions, a plan was proposed and they agreed with plan. FINAL IMPRESSION      1. Right knee buckling    2.  Acute pain of right knee          DISPOSITION/PLAN   DISPOSITION Decision To Discharge 04/09/2022 01:15:10 PM      PATIENT REFERRED TO:  Rosalia SOMMERS Marneradha Cowden, [address]  [address] [facility] [address] [facility] [place] [place] [address]  [phone]    Schedule an appointment as soon as possible for a visit in 2 days  for re-evaluation    [place] [facility]  [address] [facility]  [phone]    If symptoms worsen    [address] [place] [address] [facility], [address] [place] [address]    [phone]  Call   to be re evaluated.     [address]          DISCHARGE MEDICATIONS:  Discharge Medication List as of 4/9/2022  1:17 PM          DISCONTINUED MEDICATIONS:  Discharge Medication List as of 4/9/2022  1:17 PM                 (Please note that portions of this note were completed with a voice recognition program.  Efforts were made to edit the dictations but occasionally words are mis-transcribed.)    JUSTIN Almaraz CNP (electronically signed)         JUSTIN Almaraz CNP  04/09/22 1917

## 2022-04-11 ENCOUNTER — TELEPHONE (OUTPATIENT)
Dept: FAMILY MEDICINE CLINIC | Age: 87
End: 2022-04-11

## 2022-04-11 ENCOUNTER — CARE COORDINATION (OUTPATIENT)
Dept: CARE COORDINATION | Age: 87
End: 2022-04-11

## 2022-04-11 NOTE — TELEPHONE ENCOUNTER
Pt called and stated she fell last week and went to the urgent care. Nothing is broke but the urgent care told them to call there primary care doctor to get in right away to get an Mri. Where would you like me to put them on the schedule?

## 2022-04-11 NOTE — CARE COORDINATION
[facility] ED Follow up Call    Reason for ED visit:  Fall at home  Status:     not changed    Did you call your PCP prior to going to the ED? No      Did you receive a discharge instructions from the Emergency Room? Yes  Review of Instructions:     Understands what to report/when to return?:  Yes   Understands discharge instructions?:  Yes   Following discharge instructions?:  Yes   If not why? n/a    Are there any new complaints of pain? Yes Right knee pain  New Pain Meds? Yes    Constipation prophylaxis needed? N/A    If you have a wound is the dressing clean, dry, and intact? N/A  Understands wound care regimen? N/A    Are there any other complaints/concerns that you wish to tell your provider? Patient said her right knee buckled at home and she is in a knee brace. Patient said at baseline she ambulates independently with a walker. Patient said she is trying to get into see Dr. Fauzia Houston. ACM said she saw message that Dr. Fauzia Houston responded to to fill an ED F/U slot in her scheduled. ACM looked at schedule and patient would prefer in person for ED F/U. ACM said she would inform Dr. Fauzia Houston only open slots are for [facility]V ED FU. Patient thanked ACM for looking at schedule. ACM explained role and care coordination program to patient who agreed to enroll. Patient had no further questions at this time. ACM said she would call back with an appt time for patient. FU appts/Provider:    Future Appointments   Date Time Provider Lacy Garrett   6/7/2022  9:30 AM JUSTIN Frederick - CNP [facility]   9/22/2022  9:00 AM MD OWEN Emanuel Cinata - [facility]           New Medications?:   Yes      Medication Reconciliation by phone - Yes  Understands Medications? Yes  Taking Medications? Yes  Can you swallow your pills? Yes    Any further needs in the home i.e. Equipment?   Not Applicable    Link to services in community?:  N/A   Which services:  N/A

## 2022-04-18 ENCOUNTER — OFFICE VISIT (OUTPATIENT)
Dept: FAMILY MEDICINE CLINIC | Age: 87
End: 2022-04-18
Payer: MEDICARE

## 2022-04-18 VITALS
SYSTOLIC BLOOD PRESSURE: 132 MMHG | WEIGHT: 188 LBS | DIASTOLIC BLOOD PRESSURE: 80 MMHG | OXYGEN SATURATION: 98 % | BODY MASS INDEX: 34.39 KG/M2 | HEART RATE: 78 BPM

## 2022-04-18 DIAGNOSIS — M25.361 RIGHT KNEE BUCKLING: Primary | ICD-10-CM

## 2022-04-18 DIAGNOSIS — M25.561 ACUTE PAIN OF RIGHT KNEE: ICD-10-CM

## 2022-04-18 PROCEDURE — 99213 OFFICE O/P EST LOW 20 MIN: CPT | Performed by: FAMILY MEDICINE

## 2022-04-19 NOTE — PROGRESS NOTES
Ashleigh Yu (:  1934) is a 80 y.o. female,Established patient, here for evaluation of the following chief complaint(s):  Fall (States she fell on , her right knee gave out and she fell. She went to the hospital and no fractures.)         ASSESSMENT/PLAN:  1. Right knee buckling  -     [facility], DO, Orthopaedics and Sports Medicine, [facility]  -     [facility] - [facility] (Ortho & Sports)-[facility]  Continue knee sleeve for now. Straps were adjusted to help allow it to stay in place. 2. Acute pain of right knee  -     [address], [place], DO, Orthopaedics and Sports Medicine, [address] [facility] Physical Therapy - [facility] (Ortho & Sports)-[facility]      No follow-ups on file. Subjective   SUBJECTIVE/OBJECTIVE:  Chief Complaint   Patient presents with    Fall     States she fell on , her right knee gave out and she fell. She went to the hospital and no fractures. Fall  The accident occurred more than 1 week ago (she feel onto her right knee on , went to the ED on ). Fall occurred: she fell while getting up out of a chair, her right knee buckled and gave out of her, she landed on her right knee. She landed on hard floor. The point of impact was the right knee. Treatments tried: she was given a knee sleeve by the ED, but it doens't fit well and slides down her leg during the day. The treatment provided mild relief. She has been given the numbers for PT and orthopedics by the ED, but states she has difficulty with getting a ride and hasn't made it there yet. She states the right knee has been giving out on her frequently. Both knees have been replaced in the past, the right side more than 30 years ago. She states the left side is ok and does not give out on her, but the right has been giving out on her frequently. She would like to see orthopedics. X-ray from the hospital was negative for any fracture or dislocation.  She is wondering if she might need MRI.   Right knee pain has improved and is almost gone, but still feels unstable. Review of Systems - per above       Objective   Physical Exam  Vitals and nursing note reviewed. Constitutional:       Appearance: Normal appearance. Pulmonary:      Effort: Pulmonary effort is normal.   Musculoskeletal:      Right knee: No deformity or erythema. No tenderness. No LCL laxity, MCL laxity, ACL laxity or PCL laxity. Normal alignment. Instability Tests: Anterior drawer test negative. Posterior drawer test negative. Medial Daysi test negative and lateral Daysi test negative. Neurological:      Mental Status: She is alert. Narrative   EXAMINATION:   THREE XRAY VIEWS OF THE RIGHT KNEE       4/9/2022 11:56 am       COMPARISON:   None.       HISTORY:   ORDERING SYSTEM PROVIDED HISTORY: fall   TECHNOLOGIST PROVIDED HISTORY:   Reason for exam:->fall   Reason for Exam: pt states her knee buckled and she fell, landed on her knee,   states this has happened before, pain in anterior aspect of knee       FINDINGS:   No acute fracture or dislocation.  Status post right knee arthroplasty with   no evidence of hardware complication.  No significant joint effusion. Enthesopathy at the tendinous insertions on the patella.           Impression   Status post right knee arthroplasty.  No acute findings. An electronic signature was used to authenticate this note.     --Dalton Menjivar MD

## 2022-04-21 ENCOUNTER — CARE COORDINATION (OUTPATIENT)
Dept: CARE COORDINATION | Age: 87
End: 2022-04-21

## 2022-04-21 NOTE — CARE COORDINATION
Ambulatory Care Coordination Note  4/21/2022  CM Risk Score: 0  Charlson 10 Year Mortality Risk Score: 47%     ACC: Radha Pompa RN    Summary Note: ACM completed follow up call with patient who said she is doing well. Patient will have initial PT therapy appts 4/22 and will be utilizing Senior transportation. Patient said her knee is still giving her problems but is no longer using brace as it keeps falling down. Patient said she is anxious about what therapy will be doing for her with strengthening her knee. Patient was asking about anti inflammatories to help with pain. ACM said she would ask recommendations by PCP to ensure there are no medication interactions. Patient has no further questions or concerns at this time. Plan:    F/U call 2 weeks  F/U progression with PT/OT  OTC medications to help with pain    Ambulatory Care Coordination Assessment    Care Coordination Protocol  Program Enrollment: Complex Care  Referral from Primary Care Provider: No  Week 1 - Initial Assessment     Do you have all of your prescriptions and are they filled?: Yes  Barriers to medication adherence: None  Are you able to afford your medications?: Yes  How often do you have trouble taking your medications the way you have been told to take them?: I always take them as prescribed. Do you have Home O2 Therapy?: No      Is patient able to live independently?: Yes     Current Housing: Private Residence        Per the Fall Risk Screening, did the patient have 2 or more falls or 1 fall with injury in the past year?: No  How often do you think you are about to fall and you do NOT fall?  For example, you grab something to stabilize yourself or hold onto a wall/furniture?: Occasionally  Use of a Mobility Aid: Yes  Difficulty walking/impaired gait: Yes  Issues with feet or shoes like numbness, edema, shoes not fitting: Yes  Changes in vision, poor vision or poor lighting in environment: Yes  Dizziness: Yes        Are you experiencing loss of meaning?: No  Are you experiencing loss of hope and peace?: No     Thinking about your patient's physical health needs, are there any symptoms or problems (risk indicators) you are unsure about that require further investigation?: Mild vague physical symptoms or problems; but do not impact on daily life or are not of concern to patient   Are the patients physical health problems impacting on their mental well-being?: Mild impact on mental well-being e.g. \"\"feeling fed-up\"\", \"\"reduced enjoyment\"\"   Are there any problems with your patients lifestyle behaviors (alcohol, drugs, diet, exercise) that are impacting on physical or mental well-being?: Some mild concern of potential negative impact on well-being   Do you have any other concerns about your patients mental well-being? How would you rate their severity and impact on the patient?: Mild problems - don't interfere with function   How would you rate their home environment in terms of safety and stability (including domestic violence, insecure housing, neighbor harassment)?: Consistently safe, supportive, stable, no identified problems   How do daily activities impact on the patient's well-being? (include current or anticipated unemployment, work, caregiving, access to transportation or other): Some general dissatisfaction but no concern   How would you rate their social network (family, work, friends)?: Adequate participation with social networks   How would you rate their financial resources (including ability to afford all required medical care)?: Financially secure, some resource challenges   How wells does the patient now understand their health and well-being (symptoms, signs or risk factors) and what they need to do to manage their health?: Reasonable to good understanding but do not feel able to engage with advice at this time   How well do you think your patient can engage in healthcare discussions?  (Barriers include language, deafness, aphasia, alcohol or drug problems, learning difficulties, concentration): Adequate communication, with or without minor barriers   Do other services need to be involved to help this patient?: Other care/services in place and adequate   Are current services involved with this patient well-coordinated? (Include coordination with other services you are now recommendation): Required care/services in place and adequately coordinated   Suggested Interventions and Community Resources  Fall Risk Prevention: In Process Physical Therapy: In Process   Transportation Services: Completed         Set up/Review an Education Plan, Set up/Review Goals              Prior to Admission medications    Medication Sig Start Date End Date Taking? Authorizing Provider   fluticasone (FLONASE) 50 MCG/ACT nasal spray USE 2 SPRAYS NASALLY DAILY 3/23/22   JUSTIN Mcdowell   oxyCODONE-acetaminophen (PERCOCET) 5-325 MG per tablet Take 1 tablet by mouth 3 times daily for 30 days.  4/14/22 5/14/22  JUSTIN Lewis - CNP   albuterol sulfate  (90 Base) MCG/ACT inhaler INHALE 1-2 PUFFS INTO THE LUNGS EVERY 6 HOURS AS NEEDED FOR WHEEZING OR SHORTNESS OF BREATH 1/2/22   JUSTIN Mcdowell   mometasone (NASONEX) 50 MCG/ACT nasal spray 2 sprays by Nasal route daily 9/24/21   Jose Riddle, MD   estradiol (ESTRACE) 0.1 MG/GM vaginal cream  8/19/21   Historical Provider, MD   lisinopril-hydroCHLOROthiazide (PRINZIDE;ZESTORETIC) 20-25 MG per tablet TAKE 1 TABLET DAILY 9/20/21   Jose Riddle, MD   metoprolol tartrate (LOPRESSOR) 25 MG tablet TAKE 1 TABLET TWICE A DAY 9/20/21   Jose Riddle, MD   simvastatin (ZOCOR) 40 MG tablet TAKE 1 TABLET NIGHTLY 9/20/21   Jose Riddle, MD   Handicap Placard MISC by Does not apply route 9/20/21   Jose Riddle, MD   Vibegron (GEMTESA) 75 MG TABS Take 75 mg by mouth daily    Historical Provider, MD   albuterol sulfate (PROAIR RESPICLICK) 552 (90 Base) MCG/ACT aerosol powder inhalation Inhale 1-2 puffs into the lungs every 4 hours as needed for Wheezing or Shortness of Breath 4/27/21   Hina Suresh MD   Polyethylene Glycol 3350 (MIRALAX PO) Take by mouth daily    Historical Provider, MD   Blood Pressure KIT Check blood pressures and bring log to next visit 3/26/18   Vikki Greene,    aspirin 81 MG tablet Take 81 mg by mouth daily    Historical Provider, MD   Biotin 1 MG CAPS Take 1 capsule by mouth daily    Historical Provider, MD   Calcium Carbonate-Vitamin D (CALCIUM + D PO) Take 600 mg by mouth daily     Historical Provider, MD   Multiple Vitamins-Minerals (MULTI FOR HER PO) Take  by mouth.       Historical Provider, MD       Future Appointments   Date Time Provider Lacy Garrett   4/22/2022  1:45 PM Cely Goldman, PT [facility] EG PT [facility]   6/7/2022  9:30 AM Radha Ayala, APRN - CNP [facility]   9/22/2022  9:00 AM MD OWEN Roman Cinata - [facility]      and   General Assessment    Do you have any symptoms that are causing concern?: No

## 2022-04-22 ENCOUNTER — HOSPITAL ENCOUNTER (OUTPATIENT)
Dept: PHYSICAL THERAPY | Age: 87
Setting detail: THERAPIES SERIES
Discharge: HOME | End: 2022-04-22
Payer: MEDICARE

## 2022-04-22 PROCEDURE — 97161 PT EVAL LOW COMPLEX 20 MIN: CPT

## 2022-04-22 PROCEDURE — 97112 NEUROMUSCULAR REEDUCATION: CPT

## 2022-04-22 PROCEDURE — 97110 THERAPEUTIC EXERCISES: CPT

## 2022-04-22 NOTE — PROGRESS NOTES
[address] [place] [address] and Sports Rehabilitation, [address], [address] [place] [address] [address]  Phone: [phone]   Fax: [phone]    Date: 2022          Patient Name; :  Randi Chauhan; 1934   Dx: Diagnosis: T03.014 (ICD-10-CM) - Right knee buckling; M25.561 (ICD-10-CM) - Acute pain of right knee      Physician: Referring Provider: Hina Suresh MD        Total PT Visits:      Measures Previous Current   Pain (0-10)     Disability %           Assessment:        Prognosis for POC: [] Good [] Fair  [] Poor      Patient requires continued skilled intervention: [] Yes  [] No        Plan & Recommendations:  [] Continue rehabilitation due to objective improvement and continued functional deficits with frequency and duration:   [] Progress toward  []GAP, []Work Conditioning, []Independent HEP   [] Discharge due to   [] All goals achieved, [] Maximized \"medical necessity\" [] No subjective or objective improvements      Electronically signed by:  Cely Goldman, PT  Therapy Plan of Care Re-Certification  This patient has been re-evaluated for physical therapy services and for therapy to continue, Medicare, Medicaid and other insurances require periodic physician review of the treatment plan. Please review the above re-evaluation and verify that you agree with plan of care as established above by signing the attached document and return it to our office or note changes to established plan below  [] Follow treatment plan as above [] Discontinue physical therapy  [] Change plan to:                                 __________________________________________________    Physician Signature:____________________________________ Date:____________  By signing above, therapists plan is approved by physician    If you have any questions or concerns, please don't hesitate to call.   Thank you for your referral.

## 2022-04-22 NOTE — PLAN OF CARE
[address] [facility] and Sports Rehabilitation, [facility] Carri Morley, [address]  Phone: [phone]   Fax:     [phone]       Physical Therapy Certification    Dear Referring Provider: Rose Mary Lambert MD,    We had the pleasure of evaluating the following patient for physical therapy services at [address] [place] [address]. A summary of our findings can be found in the initial assessment below. This includes our plan of care. If you have any questions or concerns regarding these findings, please do not hesitate to contact me at the office phone number checked above. Thank you for the referral.       Physician Signature:_______________________________Date:__________________  By signing above (or electronic signature), therapists plan is approved by physician      Patient: Baldemar Loco   : 1934   MRN: [id number]  Referring Physician: Referring Provider: Rose Mary Lambert MD      Evaluation Date: 2022      Medical Diagnosis Information:  Diagnosis: R94.862 (ICD-10-CM) - Right knee buckling; M25.561 (ICD-10-CM) - Acute pain of right knee   Treatment Diagnosis: Right knee pain, weakness                                         Insurance information: PT Insurance Information: [employer] Medicare     Precautions/ Contra-indications: Fall risk    Latex Allergy:  [x]NO      []YES    Preferred Language for Healthcare:   [x]English       []other:    SUBJECTIVE: Patient states she has been having issues with knees buckling which is causing falls. Occurs without warning. Painful when knees buckle. Last fall was 22. 2    Relevant Medical History: Bilateral TKA    Pain Scale: Current: 0/10; Max 10/10;  Best 0/10    Easing factors: Rest, sitting    Provocative factors: Standing longer periods, turning leg in odd position     Type: []Constant   [x]Intermittent []Radiating []Localized []other:     Numbness/Tingling: None    Occupation/School: Retired    Living Status/Prior Level of Function: Independent with ADLs and IADLs. C-SSRS Triggered by Intake questionnaire (Past 2 wk assessment):   [x] No, Questionnaire did not trigger screening.   [] Yes, Patient intake triggered further evaluation      [] C-SSRS Screening completed  [] PCP notified via Plan of Care  [] Emergency services notified     OBJECTIVE:     ROM RIGHT LEFT   Hip Flexion     Hip Abduction     Hip Extension     Hip Internal Rotation     Hip External Rotation          Knee Extension     Knee Flexion          Ankle Dorsiflexion     Ankle Plantarflexion     Ankle Inversion     Ankle Eversion          Popliteal angle     Rectus femoris          Strength  RIGHT LEFT   Hip Flexors     Hip Abductors     Hip Extension     Hip External Rotation     Hip Internal Rotation          Knee Extension 4+    Knee Flexion 4+         Ankle Dorsiflexion     Ankle Plantarflexion     Ankle Inversion     Ankle Eversion            Reflexes/Sensation:    [x]Dermatomes/Myotomes intact    []Reflexes equal and normal bilaterally   []Other:    Joint mobility:    []Normal    [x]Hypo   []Hyper    Palpation: ttp medial joint line    Functional Mobility/Transfers: Mod I    Posture: Kyphotic    Bandages/Dressings/Incisions: None    Gait: (include devices/WB status) Using rollater     Orthopedic Special Tests:                        [x] Patient history, allergies, meds reviewed. Medical chart reviewed. See intake form. Review Of Systems (ROS):  [x]Performed Review of systems (Integumentary, CardioPulmonary, Neurological) by intake and observation. Intake form has been scanned into medical record. Patient has been instructed to contact their primary care physician regarding ROS issues if not already being addressed at this time.       Co-morbidities/Complexities (which will affect course of rehabilitation):   []None           Arthritic conditions   []Rheumatoid arthritis (M05.9)  [x]Osteoarthritis (M19.91)   Cardiovascular conditions   []Hypertension (I10)  []Hyperlipidemia (E78.5)  []Angina pectoris (I20)  []Atherosclerosis (I70)   Musculoskeletal conditions   []Disc pathology   []Congenital spine pathologies   [x]Prior surgical intervention  []Osteoporosis (M81.8)  []Osteopenia (M85.8)   Endocrine conditions   []Hypothyroid (E03.9)  []Hyperthyroid Gastrointestinal conditions   []Constipation (Q88.98)   Metabolic conditions   []Morbid obesity (E66.01)  []Diabetes type 1(E10.65) or 2 (E11.65)   []Neuropathy (G60.9)     Pulmonary conditions   []Asthma (J45)  []Coughing   []COPD (J44.9)   Psychological Disorders  []Anxiety (F41.9)  []Depression (F32.9)   []Other:   []Other:          Barriers to/and or personal factors that will affect rehab potential:              [x]Age  []Sex              []Motivation/Lack of Motivation                        [x]Co-Morbidities              []Cognitive Function, education/learning barriers              []Environmental, home barriers              []profession/work barriers  []past PT/medical experience  []other:  Justification:     Falls Risk Assessment (30 days):   [] Falls Risk assessed and no intervention required. [x] Falls Risk assessed and Patient requires intervention due to being higher risk   TUG score (>12s at risk):     [x] Falls education provided, including           Functional Questionnaire: FOTO 48        ASSESSMENT: Hernan Raya is a 80 y.o. female reporting to OP PT with c/c of right knee buckling and pain which has been occurring for many years. Pt is noted to have fairly good ROM with mild reduction in knee strength flexion and extension. She does not reach terminal knee extension during gait and ambulates with rolling walker. She has slow gait velocity which places her at high risk for falls.          Functional Impairments:     []Noted lumbar/proximal hip/LE joint hypomobility   []Decreased LE functional ROM   [x]Decreased core/proximal hip strength and neuromuscular control   [x]Decreased LE functional strength   [x]Reduced balance/proprioceptive control   []other:      Functional Activity Limitations (from functional questionnaire and intake)   []Reduced ability to tolerate prolonged functional positions   [x]Reduced ability or difficulty with changes of positions or transfers between positions   [x]Reduced ability to maintain good posture and demonstrate good body mechanics with sitting, bending, and lifting   []Reduced ability to sleep   [] Reduced ability or tolerance with driving and/or computer work   []Reduced ability to perform lifting, carrying tasks   []Reduced ability to squat   []Reduced ability to forward bend   [x]Reduced ability to ambulate prolonged functional periods/distances/surfaces   []Reduced ability to ascend/descend stairs   []Reduced ability to run, hop, cut or jump   []other:    Participation Restrictions   [x]Reduced participation in self care activities   [x]Reduced participation in home management activities   []Reduced participation in work activities   [x]Reduced participation in social activities. []Reduced participation in sport/recreation activities. Classification :    []Signs/symptoms consistent with post-surgical status including decreased ROM, strength and function.    []Signs/symptoms consistent with joint sprain/strain   []Signs/symptoms consistent with patella-femoral syndrome   []Signs/symptoms consistent with knee OA/hip OA   []Signs/symptoms consistent with internal derangement of knee/Hip   [x]Signs/symptoms consistent with functional hip/knee weakness/NMR control      []Signs/symptoms consistent with tendinitis/tendinosis    []signs/symptoms consistent with pathology which may benefit from Dry needling      []other:      Prognosis/Rehab Potential:      []Excellent   [x]Good    []Fair   []Poor    Tolerance of evaluation/treatment: []Excellent   [x]Good    []Fair   []Poor    Physical Therapy Evaluation Complexity Justification  [x] A history of present problem with:  [] no personal factors and/or comorbidities that impact the plan of care;  [x]1-2 personal factors and/or comorbidities that impact the plan of care  []3 personal factors and/or comorbidities that impact the plan of care  [x] An examination of body systems using standardized tests and measures addressing any of the following: body structures and functions (impairments), activity limitations, and/or participation restrictions;:  [x] a total of 1-2 or more elements   [] a total of 3 or more elements   [] a total of 4 or more elements   [x] A clinical presentation with:  [x] stable and/or uncomplicated characteristics   [] evolving clinical presentation with changing characteristics  [] unstable and unpredictable characteristics;   [x] Clinical decision making of [] low, [] moderate, [] high complexity using standardized patient assessment instrument and/or measurable assessment of functional outcome. [x] EVAL (LOW) 76429 (typically 20 minutes face-to-face)  [] EVAL (MOD) 55393 (typically 30 minutes face-to-face)  [] EVAL (HIGH) 68310 (typically 45 minutes face-to-face)  [] RE-EVAL     PLAN:   Frequency/Duration:  1-2 days per week for 6 Weeks:  Interventions:  [x]  Therapeutic exercise including: strength training, ROM, for Lower extremity and core   [x]  NMR activation and proprioception for LE, Glutes and Core   [x]  Manual therapy as indicated for LE, Hip and spine to include: Dry Needling/IASTM, STM, PROM, Gr I-IV mobilizations, manipulation. [x] Modalities as needed that may include: thermal agents, E-stim, Biofeedback, US, iontophoresis as indicated  [x] Patient education on joint protection, postural re-education, activity modification, progression of HEP.     HEP instruction: [id number]      GOALS:  Patient stated goal: Reduce falls, increase stability    Therapist goals for Patient:   Short Term Goals: To be achieved in: 2 weeks  1. Independent in HEP and progression per patient tolerance, in order to prevent re-injury. [] Progressing: [] Met: [] Not Met: [] Adjusted     2. Patient will have a decrease in pain of NRPS to <2/10 facilitate improvement in movement, function, and ADLs as indicated by Functional Deficits. [] Progressing: [] Met: [] Not Met: [] Adjusted     Long Term Goals: To be achieved in: 8 weeks  1. Pt will improve FOTO to 64 or more, indicating improved functional capacity. [] Progressing: [] Met: [] Not Met: [] Adjusted     2. Patient will demonstrate increased AROM to 0-110 to knee to allow for proper joint functioning as indicated by patients Functional Deficits. [] Progressing: [] Met: [] Not Met: [] Adjusted     3. Patient will demonstrate an increase in Strength to 5/5 to knee flex/ext allow for proper functional mobility as indicated by patients Functional Deficits. [] Progressing: [] Met: [] Not Met: [] Adjusted     4. Patient will return to functional walking activities with NRPS of 0/10. [] Progressing: [] Met: [] Not Met: [] Adjusted     5.  Pt will have no reports of knee buckling.  (patient specific functional goal)    [] Progressing: [] Met: [] Not Met: [] Adjusted      Electronically signed by:  Stacey Blancas, PT

## 2022-04-22 NOTE — FLOWSHEET NOTE
[facility] [facility], [address] [facility] [address]  Phone: [phone]   Fax:     [phone]      Physical Therapy Treatment Note/ Progress Report:           Date:  2022    Patient Name:  Skylar Sidhu    :  1934  MRN: [id number]  Restrictions/Precautions:    Medical/Treatment Diagnosis Information:  · Diagnosis: M25.361 (ICD-10-CM) - Right knee buckling; M25.561 (ICD-10-CM) - Acute pain of right knee  · Treatment Diagnosis: Right knee pain, decreased strength  Insurance/Certification information:  PT Insurance Information: tna Medicare  Physician Information:  Referring Provider: Agata Bailey MD  Has the plan of care been signed (Y/N):        []  Yes  []  No     Date of Patient follow up with Physician:     Assessment Summary: Diane Jama is a 80 y.o. female reporting to OP PT with c/c of right knee buckling and pain which has been occurring for many years. Pt is noted to have fairly good ROM with mild reduction in knee strength flexion and extension. She does not reach terminal knee extension during gait and ambulates with rolling walker. She has slow gait velocity which places her at high risk for falls.        Is this a Progress Report:     []  Yes  [x]  No        If Yes:  Date Range for reporting period:  Beginning   22  Ending 22    Progress report will be due (10 Rx or 30 days whichever is less):        Recertification will be due (POC Duration  / 90 days whichever is less): 22          Visit # Insurance Allowable Auth Required   In Person = Arturo Ramirez []  Yes     []  No    Tele Health   []  Yes     []  No    Total 1             Functional Scale: FOTO 48    Date assessed:       Latex Allergy:  [x]NO      []YES  Preferred Language for Healthcare:   [x]English       []other:      Pain level:  0-3/10     SUBJECTIVE:  see eval    OBJECTIVE: see eval      RESTRICTIONS/PRECAUTIONS: Fall risk    Exercises/Interventions: HEP code: [id number]  Therapeutic Ex (37603) HEP 4/22/22     Warm-up       Rec bike              TABLE       Quad set x x20     SLR x x10     Hip adduction isometric x x15                          SEATED       LAQ x x20 B, 2#     Sit<>stand x x1                                 STANDING       Knee flexion/butt kick x x20 B, 2#     Marching x Reviewed                                                                                                  Manual (26284): None    Therapeutic Exercise and NMR EXR  [x] (39449) Provided verbal/tactile cueing for activities related to strengthening, flexibility, endurance, ROM for improvements in LE, proximal hip, and core control with self care, mobility, lifting, ambulation. [x] (49688) Provided verbal/tactile cueing for activities related to improving balance, coordination, kinesthetic sense, posture, motor skill, proprioception to assist with LE, proximal hip, and core control in self-care, mobility, lifting, ambulation and eccentric single leg control.      NMR and Therapeutic Activities:    [x] (34826 or 05693) Provided verbal/tactile cueing for activities related to improving balance, coordination, kinesthetic sense, posture, motor skill, proprioception and motor activation to allow for proper function of core, proximal hip and LE with self-care and ADLs and functional mobility.   [] (13406) Gait Re-education- Provided training and instruction to the patient for proper LE, core and proximal hip recruitment and positioning and eccentric body weight control with ambulation re-education including up and down stairs     Home Exercise Program:    [x] (98533) Reviewed/Progressed HEP activities related to strengthening, flexibility, endurance, ROM of core, proximal hip and LE for functional self-care, mobility, lifting and ambulation/stair navigation   [] (32426) Reviewed/Progressed HEP activities related to improving balance, coordination, kinesthetic sense, posture, motor skill, proprioception of core, proximal hip and LE for self-care, mobility, lifting, and ambulation/stair navigation      Manual Treatments:  PROM / STM / Oscillations-Mobs:  G-I, II, III, IV (PA's, Inf., Post.)  [x] (27826) Provided manual therapy to mobilize LE, proximal hip and/or LS spine soft tissue/joints for the purpose of modulating pain, promoting relaxation, increasing ROM, reducing/eliminating soft tissue swelling/inflammation/restriction, improving soft tissue extensibility and allowing for proper ROM for normal function with self-care, mobility, lifting and ambulation. Modalities:     [] GAME READY (VASO)- for significant edema, swelling, pain control. Charges:  Timed Code Treatment Minutes: 30   Total Treatment Minutes:  45   BWC:  TE TIME:  NMR TIME:  MANUAL TIME:   TA  UNTIMED MINUTES:  Medicare Total:   15  15      15        [x] EVAL (LOW) 79957 (typically 20 minutes face-to-face)  [] EVAL (MOD) 86979 (typically 30 minutes face-to-face)  [] EVAL (HIGH) 15576 (typically 45 minutes face-to-face)  [] RE-EVAL     [x] HQ(03701) 1     [] IONTO  [x] NMR (85640) 1      [] VASO  [] Manual (41510) x     [] Dry Needle:  [] TA x      [] Mech Traction (74449)  [] ES(attended) (86879)      [] ES (un) (97235):        GOALS:     Patient stated goal: Reduce falls, increase stability     Therapist goals for Patient:   Short Term Goals: To be achieved in: 2 weeks  1. Independent in HEP and progression per patient tolerance, in order to prevent re-injury. []? Progressing: []? Met: []? Not Met: []? Adjusted      2. Patient will have a decrease in pain of NRPS to <2/10 facilitate improvement in movement, function, and ADLs as indicated by Functional Deficits. []? Progressing: []? Met: []? Not Met: []? Adjusted      Long Term Goals: To be achieved in: 8 weeks  1. Pt will improve FOTO to 64 or more, indicating improved functional capacity. []? Progressing: []? Met: []?  Not Met: []? Adjusted      2. Patient will demonstrate increased AROM to 0-110 to knee to allow for proper joint functioning as indicated by patients Functional Deficits. []? Progressing: []? Met: []? Not Met: []? Adjusted      3. Patient will demonstrate an increase in Strength to 5/5 to knee flex/ext allow for proper functional mobility as indicated by patients Functional Deficits. []? Progressing: []? Met: []? Not Met: []? Adjusted      4. Patient will return to functional walking activities with NRPS of 0/10. []? Progressing: []? Met: []? Not Met: []? Adjusted      5. Pt will have no reports of knee buckling.  (patient specific functional goal)    []? Progressing: []? Met: []? Not Met: []? Adjusted                 ASSESSMENT:  See kim    Patient received education on their current pathology and how their condition effects them with their functional activities. Patient understood discussion and questions were answered. Patient understands their activity limitations and understands rational for treatment progression. Pt educated on plan of care and HEP, if worsening symptoms to d/c that exercise. Return to Play: (if applicable)   []  Stage 1: Intro to Strength   []  Stage 2: Return to Run and Strength   []  Stage 3: Return to Jump and Strength   []  Stage 4: Dynamic Strength and Agility   []  Stage 5: Sport Specific Training     []  Ready to Return to Play, Meets All Above Stages   []  Not Ready for Return to Sports   Comments:                               PLAN: See kim  [x] Continue per plan of care [] Alter current plan (see comments above)  [] Plan of care initiated [] Hold pending MD visit [] Discharge      Electronically signed by:  Romana Rose, PT    Note: If patient does not return for scheduled/ recommended follow up visits, this note will serve as a discharge from care along with most recent update on progress.

## 2022-05-03 ENCOUNTER — HOSPITAL ENCOUNTER (OUTPATIENT)
Dept: PHYSICAL THERAPY | Age: 87
Setting detail: THERAPIES SERIES
Discharge: HOME | End: 2022-05-03
Payer: MEDICARE

## 2022-05-03 PROCEDURE — 97112 NEUROMUSCULAR REEDUCATION: CPT

## 2022-05-03 PROCEDURE — 97110 THERAPEUTIC EXERCISES: CPT

## 2022-05-03 NOTE — FLOWSHEET NOTE
[address] [facility], [address], [address] [place] [address] [address]  Phone: [phone]   Fax:     [phone]      Physical Therapy Treatment Note/ Progress Report:           Date:  5/3/2022    Patient Name:  Hoda Goncalves    :  1934  MRN: [id number]  Restrictions/Precautions:    Medical/Treatment Diagnosis Information:  · Diagnosis: M25.361 (ICD-10-CM) - Right knee buckling; M25.561 (ICD-10-CM) - Acute pain of right knee  · Treatment Diagnosis: Right knee pain, decreased strength  Insurance/Certification information:  PT Insurance Information: tna Medicare  Physician Information:  Referring Provider: Stefani Stokes MD  Has the plan of care been signed (Y/N):        []  Yes  []  No     Date of Patient follow up with Physician:     Assessment Summary: Vilma Luciano is a 80 y.o. female reporting to OP PT with c/c of right knee buckling and pain which has been occurring for many years. Pt is noted to have fairly good ROM with mild reduction in knee strength flexion and extension. She does not reach terminal knee extension during gait and ambulates with rolling walker. She has slow gait velocity which places her at high risk for falls.        Is this a Progress Report:     []  Yes  [x]  No        If Yes:  Date Range for reporting period:  Beginning   22  Ending 22    Progress report will be due (10 Rx or 30 days whichever is less):        Recertification will be due (POC Duration  / 90 days whichever is less): 22          Visit # Insurance Allowable Auth Required   In Traci Mcfarland [address] []  Yes     []  No    Tele Health   []  Yes     []  No    Total 2             Functional Scale: FOTO 48    Date assessed:       Latex Allergy:  [x]NO      []YES  Preferred Language for Healthcare:   [x]English       []other:      Pain level:  5/10     SUBJECTIVE:  Pt states back is sore from [place] scooter [place]    OBJECTIVE: RESTRICTIONS/PRECAUTIONS: Fall risk    Exercises/Interventions: HEP code: [id number]  Therapeutic Ex (25417) HEP 4/22/22 5/3/22    Warm-up       Rec bike              TABLE       Quad set x x20     SLR x x10     Hip adduction isometric x x15 x20    Lumbar rotations   x15 B                  SEATED       LAQ x x20 B, 2# x20 B, 2#    Sit<>stand x x1     SB rolls   x15                         STANDING       Knee flexion/butt kick x x20 B, 2# X15, B 1#    Marching x Reviewed x15 B, 1#    Abduction   x15 B, 1#    Anterior step ups   x15 B, 4\"                                                                                   Manual (42107): None    Therapeutic Exercise and NMR EXR  [x] (27853) Provided verbal/tactile cueing for activities related to strengthening, flexibility, endurance, ROM for improvements in LE, proximal hip, and core control with self care, mobility, lifting, ambulation. [x] (38755) Provided verbal/tactile cueing for activities related to improving balance, coordination, kinesthetic sense, posture, motor skill, proprioception to assist with LE, proximal hip, and core control in self-care, mobility, lifting, ambulation and eccentric single leg control.      NMR and Therapeutic Activities:    [x] (51472 or 35583) Provided verbal/tactile cueing for activities related to improving balance, coordination, kinesthetic sense, posture, motor skill, proprioception and motor activation to allow for proper function of core, proximal hip and LE with self-care and ADLs and functional mobility.   [] (75475) Gait Re-education- Provided training and instruction to the patient for proper LE, core and proximal hip recruitment and positioning and eccentric body weight control with ambulation re-education including up and down stairs     Home Exercise Program:    [x] (62681) Reviewed/Progressed HEP activities related to strengthening, flexibility, endurance, ROM of core, proximal hip and LE for functional self-care, mobility, lifting and ambulation/stair navigation   [] (53202) Reviewed/Progressed HEP activities related to improving balance, coordination, kinesthetic sense, posture, motor skill, proprioception of core, proximal hip and LE for self-care, mobility, lifting, and ambulation/stair navigation      Manual Treatments:  PROM / STM / Oscillations-Mobs:  G-I, II, III, IV (PA's, Inf., Post.)  [x] (95552) Provided manual therapy to mobilize LE, proximal hip and/or LS spine soft tissue/joints for the purpose of modulating pain, promoting relaxation, increasing ROM, reducing/eliminating soft tissue swelling/inflammation/restriction, improving soft tissue extensibility and allowing for proper ROM for normal function with self-care, mobility, lifting and ambulation. Modalities:     [] GAME READY (VASO)- for significant edema, swelling, pain control. Charges:  Timed Code Treatment Minutes: 40   Total Treatment Minutes:  40   BWC:  TE TIME:  NMR TIME:  MANUAL TIME:   TA  UNTIMED MINUTES:  Medicare Total:   25  15              [] EVAL (LOW) 78293 (typically 20 minutes face-to-face)  [] EVAL (MOD) 93691 (typically 30 minutes face-to-face)  [] EVAL (HIGH) 64623 (typically 45 minutes face-to-face)  [] RE-EVAL     [x] IM(37373) 2     [] IONTO  [x] NMR (15434) 1      [] VASO  [] Manual (45946) x     [] Dry Needle:  [] TA x      [] Mech Traction (52352)  [] ES(attended) (93806)      [] ES (un) (81615):        GOALS:     Patient stated goal: Reduce falls, increase stability     Therapist goals for Patient:   Short Term Goals: To be achieved in: 2 weeks  1. Independent in HEP and progression per patient tolerance, in order to prevent re-injury. []? Progressing: []? Met: []? Not Met: []? Adjusted      2. Patient will have a decrease in pain of NRPS to <2/10 facilitate improvement in movement, function, and ADLs as indicated by Functional Deficits. []? Progressing: []? Met: []? Not Met: []? Adjusted      Long Term Goals:  To be achieved in: 8 weeks  1. Pt will improve FOTO to 64 or more, indicating improved functional capacity. []? Progressing: []? Met: []? Not Met: []? Adjusted      2. Patient will demonstrate increased AROM to 0-110 to knee to allow for proper joint functioning as indicated by patients Functional Deficits. []? Progressing: []? Met: []? Not Met: []? Adjusted      3. Patient will demonstrate an increase in Strength to 5/5 to knee flex/ext allow for proper functional mobility as indicated by patients Functional Deficits. []? Progressing: []? Met: []? Not Met: []? Adjusted      4. Patient will return to functional walking activities with NRPS of 0/10. []? Progressing: []? Met: []? Not Met: []? Adjusted      5. Pt will have no reports of knee buckling.  (patient specific functional goal)    []? Progressing: []? Met: []? Not Met: []? Adjusted                 ASSESSMENT:  Pt cheri session well. Able to make some more standing progressions. Patient received education on their current pathology and how their condition effects them with their functional activities. Patient understood discussion and questions were answered. Patient understands their activity limitations and understands rational for treatment progression. Pt educated on plan of care and HEP, if worsening symptoms to d/c that exercise. PLAN: See eval  [x] Continue per plan of care [] Alter current plan (see comments above)  [] Plan of care initiated [] Hold pending MD visit [] Discharge      Electronically signed by:  Prince Mendoza PT    Note: If patient does not return for scheduled/ recommended follow up visits, this note will serve as a discharge from care along with most recent update on progress.

## 2022-05-05 ENCOUNTER — CARE COORDINATION (OUTPATIENT)
Dept: CARE COORDINATION | Age: 87
End: 2022-05-05

## 2022-05-05 SDOH — ECONOMIC STABILITY: HOUSING INSECURITY
IN THE LAST 12 MONTHS, WAS THERE A TIME WHEN YOU DID NOT HAVE A STEADY PLACE TO SLEEP OR SLEPT IN A SHELTER (INCLUDING NOW)?: NO

## 2022-05-05 SDOH — ECONOMIC STABILITY: HOUSING INSECURITY: IN THE LAST 12 MONTHS, HOW MANY PLACES HAVE YOU LIVED?: 1

## 2022-05-05 SDOH — HEALTH STABILITY: PHYSICAL HEALTH: ON AVERAGE, HOW MANY MINUTES DO YOU ENGAGE IN EXERCISE AT THIS LEVEL?: 30 MIN

## 2022-05-05 SDOH — HEALTH STABILITY: PHYSICAL HEALTH: ON AVERAGE, HOW MANY DAYS PER WEEK DO YOU ENGAGE IN MODERATE TO STRENUOUS EXERCISE (LIKE A BRISK WALK)?: 3 DAYS

## 2022-05-05 SDOH — ECONOMIC STABILITY: INCOME INSECURITY: IN THE LAST 12 MONTHS, WAS THERE A TIME WHEN YOU WERE NOT ABLE TO PAY THE MORTGAGE OR RENT ON TIME?: NO

## 2022-05-05 ASSESSMENT — LIFESTYLE VARIABLES: HOW OFTEN DO YOU HAVE A DRINK CONTAINING ALCOHOL: NEVER

## 2022-05-05 NOTE — CARE COORDINATION
Ambulatory Care Coordination Note  5/5/2022  CM Risk Score: 0  Charlson 10 Year Mortality Risk Score: 47%     ACC: Tia Chavez RN    Summary Note: ACM completed follow up call with patient who said she is doing well. Patient is progressing with therapy and is having minimal pain. Patient said she does not have to take pain medication. Patient has no further concerns at this time. ACM will graduate from [address] [place] [address]. Care Coordination Interventions    Program Enrollment: Complex Care  Referral from Primary Care Provider: No  Suggested Interventions and Community Resources  Fall Risk Prevention: Completed  Physical Therapy: Completed (Comment: Will have initial evaluation on 4/22)  Transportation Support: Completed         Goals Addressed                 This Visit's Progress     Reduce Falls    Improving     I will reduce my risk of falls by the following: Follow through on orders for PT    Barriers: transportation  Plan for overcoming my barriers: Patient will utilize [employer] transportation  Confidence: 8/10  Anticipated Goal Completion Date: 6/21/22            Prior to Admission medications    Medication Sig Start Date End Date Taking? Authorizing Provider   fluticasone (FLONASE) 50 MCG/ACT nasal spray USE 2 SPRAYS NASALLY DAILY 3/23/22   JUSTIN Avila   oxyCODONE-acetaminophen (PERCOCET) 5-325 MG per tablet Take 1 tablet by mouth 3 times daily for 30 days.  4/14/22 5/14/22  JUSTIN Valenzuela - CNP   albuterol sulfate  (90 Base) MCG/ACT inhaler INHALE 1-2 PUFFS INTO THE LUNGS EVERY 6 HOURS AS NEEDED FOR WHEEZING OR SHORTNESS OF BREATH 1/2/22   JUSTIN Avila   mometasone (NASONEX) 50 MCG/ACT nasal spray 2 sprays by Nasal route daily 9/24/21   Jas Horne MD   estradiol (ESTRACE) 0.1 MG/GM vaginal cream  8/19/21   Historical Provider, MD   lisinopril-hydroCHLOROthiazide (PRINZIDE;ZESTORETIC) 20-25 MG per tablet TAKE 1 TABLET DAILY 9/20/21   Jas Horne MD   metoprolol tartrate (LOPRESSOR) 25 MG tablet TAKE 1 TABLET TWICE A DAY 9/20/21   Vivian Castro MD   simvastatin (ZOCOR) 40 MG tablet TAKE 1 TABLET NIGHTLY 9/20/21   Vivian Castro MD   Handicap Placard MISC by Does not apply route 9/20/21   Vivian Castro MD   Vibegron (GEMTESA) 75 MG TABS Take 75 mg by mouth daily    Historical Provider, MD   albuterol sulfate (PROAIR RESPICLICK) 607 (90 Base) MCG/ACT aerosol powder inhalation Inhale 1-2 puffs into the lungs every 4 hours as needed for Wheezing or Shortness of Breath 4/27/21   Vivian Castro MD   Polyethylene Glycol 3350 (MIRALAX PO) Take by mouth daily    Historical Provider, MD   Blood Pressure KIT Check blood pressures and bring log to next visit 3/26/18   Vikki Greene DO   aspirin 81 MG tablet Take 81 mg by mouth daily    Historical Provider, MD   Biotin 1 MG CAPS Take 1 capsule by mouth daily    Historical Provider, MD   Calcium Carbonate-Vitamin D (CALCIUM + D PO) Take 600 mg by mouth daily     Historical Provider, MD   Multiple Vitamins-Minerals (MULTI FOR HER PO) Take  by mouth.       Historical Provider, MD       Future Appointments   Date Time Provider Lacy Garrett   5/9/2022  2:30 PM Arya Hanks, PT [facility] PT [facility]   5/17/2022  2:30 PM Arya Hanks, PT [facility] EG PT [facility]   6/7/2022  9:30 AM JUSTIN Venegas - CNP [facility] [place]   9/22/2022  9:00 AM MD OWEN Odonnell [facility] [place] - [facility]      and   General Assessment    Do you have any symptoms that are causing concern?: No

## 2022-05-09 ENCOUNTER — HOSPITAL ENCOUNTER (OUTPATIENT)
Dept: PHYSICAL THERAPY | Age: 87
Setting detail: THERAPIES SERIES
Discharge: HOME | End: 2022-05-09
Payer: MEDICARE

## 2022-05-09 PROCEDURE — 97112 NEUROMUSCULAR REEDUCATION: CPT

## 2022-05-09 PROCEDURE — 97110 THERAPEUTIC EXERCISES: CPT

## 2022-05-09 NOTE — FLOWSHEET NOTE
[facility] [facility], [address] [place] [address] [address]  Phone: [phone]   Fax:     [phone]      Physical Therapy Treatment Note/ Progress Report:           Date:  2022    Patient Name:  Felicitas Munoz    :  1934  MRN: [id number]  Restrictions/Precautions:    Medical/Treatment Diagnosis Information:  · Diagnosis: M25.361 (ICD-10-CM) - Right knee buckling; M25.561 (ICD-10-CM) - Acute pain of right knee  · Treatment Diagnosis: Right knee pain, decreased strength  Insurance/Certification information:  PT Insurance Information: t Medicare  Physician Information:  Referring Provider: Dalton Menjivar MD  Has the plan of care been signed (Y/N):        []  Yes  []  No     Date of Patient follow up with Physician:     Assessment Summary: Amarilis Lucas is a 80 y.o. female reporting to OP PT with c/c of right knee buckling and pain which has been occurring for many years. Pt is noted to have fairly good ROM with mild reduction in knee strength flexion and extension. She does not reach terminal knee extension during gait and ambulates with rolling walker. She has slow gait velocity which places her at high risk for falls. Is this a Progress Report:     []  Yes  [x]  No        If Yes:  Date Range for reporting period:  Beginning   22  Ending 22    Progress report will be due (10 Rx or 30 days whichever is less): 70       Recertification will be due (POC Duration  / 90 days whichever is less): 22          Visit # Insurance Allowable Auth Required   In [facility] [address] []  Yes     []  No    Tele Health   []  Yes     []  No    Total 3             Functional Scale: FOTO 48    Date assessed:       Latex Allergy:  [x]NO      []YES  Preferred Language for Healthcare:   [x]English       []other:      Pain level:  0/10     SUBJECTIVE:  Pt states back isn't a sore today.      OBJECTIVE:       RESTRICTIONS/PRECAUTIONS: Fall risk    Exercises/Interventions: HEP code: [id number]  Therapeutic Ex (74772) HEP 4/22/22 5/3/22 5/9/22   Warm-up       Rec bike    6'          TABLE       Quad set x x20     SLR x x10     Hip adduction isometric x x15 x20    Lumbar rotations   x15 B                  SEATED       LAQ x x20 B, 2# x20 B, 2# x30 B, 2#   Sit<>stand x x1  10x2   SB rolls   x15                         STANDING       Knee flexion/butt kick x x20 B, 2# X15, B 1# 10x2 B, 2#   Marching x Reviewed x15 B, 1# 10x2 B, 2#   Abduction   x15 B, 1# 10x2 B, 2#   Anterior step ups   x15 B, 4\" x10 B, 4\"   Lateral step ups    x10 B, 4\"                                                                           Manual (60168):  None    Therapeutic Exercise and NMR EXR  [x] (59670) Provided verbal/tactile cueing for activities related to strengthening, flexibility, endurance, ROM for improvements in LE, proximal hip, and core control with self care, mobility, lifting, ambulation. [x] (88248) Provided verbal/tactile cueing for activities related to improving balance, coordination, kinesthetic sense, posture, motor skill, proprioception to assist with LE, proximal hip, and core control in self-care, mobility, lifting, ambulation and eccentric single leg control.      NMR and Therapeutic Activities:    [x] (27131 or 36978) Provided verbal/tactile cueing for activities related to improving balance, coordination, kinesthetic sense, posture, motor skill, proprioception and motor activation to allow for proper function of core, proximal hip and LE with self-care and ADLs and functional mobility.   [] (70813) Gait Re-education- Provided training and instruction to the patient for proper LE, core and proximal hip recruitment and positioning and eccentric body weight control with ambulation re-education including up and down stairs     Home Exercise Program:    [x] (59473) Reviewed/Progressed HEP activities related to strengthening, flexibility, endurance, ROM of core, proximal hip and LE for functional self-care, mobility, lifting and ambulation/stair navigation   [] (52663) Reviewed/Progressed HEP activities related to improving balance, coordination, kinesthetic sense, posture, motor skill, proprioception of core, proximal hip and LE for self-care, mobility, lifting, and ambulation/stair navigation      Manual Treatments:  PROM / STM / Oscillations-Mobs:  G-I, II, III, IV (PA's, Inf., Post.)  [x] (18652) Provided manual therapy to mobilize LE, proximal hip and/or LS spine soft tissue/joints for the purpose of modulating pain, promoting relaxation, increasing ROM, reducing/eliminating soft tissue swelling/inflammation/restriction, improving soft tissue extensibility and allowing for proper ROM for normal function with self-care, mobility, lifting and ambulation. Modalities:     [] GAME READY (VASO)- for significant edema, swelling, pain control. Charges:  Timed Code Treatment Minutes: 40   Total Treatment Minutes:  40   BWC:  TE TIME:  NMR TIME:  MANUAL TIME:   TA  UNTIMED MINUTES:  Medicare Total:   25  15              [] EVAL (LOW) 60970 (typically 20 minutes face-to-face)  [] EVAL (MOD) 04842 (typically 30 minutes face-to-face)  [] EVAL (HIGH) 38290 (typically 45 minutes face-to-face)  [] RE-EVAL     [x] EZ(13247) 2     [] IONTO  [x] NMR (06178) 1      [] VASO  [] Manual (54096) x     [] Dry Needle:  [] TA x      [] Mech Traction (28018)  [] ES(attended) (14396)      [] ES (un) (15652):        GOALS:     Patient stated goal: Reduce falls, increase stability     Therapist goals for Patient:   Short Term Goals: To be achieved in: 2 weeks  1. Independent in HEP and progression per patient tolerance, in order to prevent re-injury. []? Progressing: [x]? Met: []? Not Met: []? Adjusted      2. Patient will have a decrease in pain of NRPS to <2/10 facilitate improvement in movement, function, and ADLs as indicated by Functional Deficits. []? Progressing: [x]?  Met: []? Not Met: []? Adjusted      Long Term Goals: To be achieved in: 8 weeks  1. Pt will improve FOTO to 64 or more, indicating improved functional capacity. []? Progressing: []? Met: []? Not Met: []? Adjusted      2. Patient will demonstrate increased AROM to 0-110 to knee to allow for proper joint functioning as indicated by patients Functional Deficits. []? Progressing: []? Met: []? Not Met: []? Adjusted      3. Patient will demonstrate an increase in Strength to 5/5 to knee flex/ext allow for proper functional mobility as indicated by patients Functional Deficits. []? Progressing: [x]? Met: []? Not Met: []? Adjusted      4. Patient will return to functional walking activities with NRPS of 0/10. []? Progressing: [x]? Met: []? Not Met: []? Adjusted      5. Pt will have no reports of knee buckling.  (patient specific functional goal)    []? Progressing: [x]? Met: []? Not Met: []? Adjusted                 ASSESSMENT:  Pt cheri session well. Functionally is moving better. Patient received education on their current pathology and how their condition effects them with their functional activities. Patient understood discussion and questions were answered. Patient understands their activity limitations and understands rational for treatment progression. Pt educated on plan of care and HEP, if worsening symptoms to d/c that exercise. PLAN: See eval  [x] Continue per plan of care [] Alter current plan (see comments above)  [] Plan of care initiated [] Hold pending MD visit [] Discharge      Electronically signed by:  Ciro Schafer PT    Note: If patient does not return for scheduled/ recommended follow up visits, this note will serve as a discharge from care along with most recent update on progress.

## 2022-05-17 ENCOUNTER — APPOINTMENT (OUTPATIENT)
Dept: PHYSICAL THERAPY | Age: 87
End: 2022-05-17
Payer: MEDICARE

## 2022-09-26 ENCOUNTER — TELEPHONE (OUTPATIENT)
Dept: FAMILY MEDICINE CLINIC | Age: 87
End: 2022-09-26

## 2022-09-27 ENCOUNTER — TELEPHONE (OUTPATIENT)
Dept: FAMILY MEDICINE CLINIC | Age: 87
End: 2022-09-27

## 2022-09-27 NOTE — TELEPHONE ENCOUNTER
----- Message from Darby Jauregui sent at 9/27/2022 11:43 AM EDT -----  Subject: Appointment Request    Reason for Call: Established Patient Appointment needed: Routine Medicare   AWV    QUESTIONS    Reason for appointment request? No appointments available during search     Additional Information for Provider? Narendra Abdi is calling to reschedule her AWV   for 10/28 at 10:30. please schedule her for this as there is notes left in   that date is available. nothing comes up for me please give patient a call   to confirm.    ---------------------------------------------------------------------------  --------------  Keyonna MCNALLY  [phone]; OK to leave message on voicemail  ---------------------------------------------------------------------------  --------------  SCRIPT ANSWERS  COVID Screen: Ab Torres

## 2022-09-27 NOTE — TELEPHONE ENCOUNTER
Please schedule AWV with LPN - can be done on the phone. Ok to schedule that now. It's fine to see her any of those days. Please use any 30 min time slot available. Probably 10/28 or 10/31 as there more openings available those days. We can follow up on chronic conditions, get labs, vaccinations updated, etc on that date.

## 2022-09-27 NOTE — TELEPHONE ENCOUNTER
Called and spoke with patient she is scheduled 11/14 but it is going to call the senior services and see if she can get a ride for 10/28 at 1030 normal...

## 2022-10-17 DIAGNOSIS — E78.5 HYPERLIPIDEMIA, UNSPECIFIED HYPERLIPIDEMIA TYPE: ICD-10-CM

## 2022-10-17 DIAGNOSIS — I10 ESSENTIAL HYPERTENSION: ICD-10-CM

## 2022-10-17 RX ORDER — LISINOPRIL AND HYDROCHLOROTHIAZIDE 25; 20 MG/1; MG/1
TABLET ORAL
Qty: 90 TABLET | Refills: 0 | Status: SHIPPED | OUTPATIENT
Start: 2022-10-17 | End: 2022-11-16

## 2022-10-17 RX ORDER — SIMVASTATIN 40 MG
TABLET ORAL
Qty: 90 TABLET | Refills: 0 | Status: SHIPPED | OUTPATIENT
Start: 2022-10-17 | End: 2022-12-12 | Stop reason: SDUPTHER

## 2022-10-17 NOTE — TELEPHONE ENCOUNTER
Refill Request     CONFIRM preferrred pharmacy with the patient. If Mail Order Rx - Pend for 90 day refill. Last Seen: Last Seen Department: 4/18/2022  Last Seen by PCP: 4/18/2022    Last Written: 9/20/21 3 refills    If no future appointment scheduled, route STAFF MESSAGE with patient name to the [facility] for scheduling. Next Appointment:   Future Appointments   Date Time Provider Lacy Garrett   11/14/2022  1:00 PM MD OWEN Gordon  [place] - [facility]   12/6/2022  9:30 AM JUSTIN Denson CNP [facility] AND [facility]       Message sent to  to schedule appt with patient?   NO      Requested Prescriptions     Pending Prescriptions Disp Refills    simvastatin (ZOCOR) 40 MG tablet [Pharmacy Med Name: SIMVASTATIN TABS 40MG] 90 tablet 3     Sig: TAKE 1 TABLET NIGHTLY    lisinopril-hydroCHLOROthiazide (PRINZIDE;ZESTORETIC) 20-25 MG per tablet [Pharmacy Med Name: LISINOPRIL/HCTZ TABS 20/25MG] 90 tablet 3     Sig: TAKE 1 TABLET DAILY    metoprolol tartrate (LOPRESSOR) 25 MG tablet [Pharmacy Med Name: METOPROLOL TARTRATE TABS 25MG] 180 tablet 3     Sig: TAKE 1 TABLET TWICE A DAY

## 2022-11-14 ENCOUNTER — OFFICE VISIT (OUTPATIENT)
Dept: FAMILY MEDICINE CLINIC | Age: 87
End: 2022-11-14
Payer: MEDICARE

## 2022-11-14 VITALS
OXYGEN SATURATION: 95 % | SYSTOLIC BLOOD PRESSURE: 130 MMHG | HEART RATE: 74 BPM | HEIGHT: 62 IN | WEIGHT: 187 LBS | DIASTOLIC BLOOD PRESSURE: 60 MMHG | BODY MASS INDEX: 34.41 KG/M2

## 2022-11-14 DIAGNOSIS — G95.9 CERVICAL MYELOPATHY (HCC): ICD-10-CM

## 2022-11-14 DIAGNOSIS — E78.5 HYPERLIPIDEMIA, UNSPECIFIED HYPERLIPIDEMIA TYPE: ICD-10-CM

## 2022-11-14 DIAGNOSIS — E83.52 HYPERCALCEMIA: ICD-10-CM

## 2022-11-14 DIAGNOSIS — R73.09 ELEVATED GLUCOSE: ICD-10-CM

## 2022-11-14 DIAGNOSIS — Z00.00 MEDICARE ANNUAL WELLNESS VISIT, SUBSEQUENT: Primary | ICD-10-CM

## 2022-11-14 DIAGNOSIS — I10 ESSENTIAL HYPERTENSION: ICD-10-CM

## 2022-11-14 PROBLEM — F11.29 OPIOID DEPENDENCE WITH UNSPECIFIED OPIOID-INDUCED DISORDER (HCC): Status: RESOLVED | Noted: 2021-06-10 | Resolved: 2022-11-14

## 2022-11-14 PROBLEM — F11.99 OPIOID USE, UNSPECIFIED WITH UNSPECIFIED OPIOID-INDUCED DISORDER (HCC): Status: RESOLVED | Noted: 2021-06-10 | Resolved: 2022-11-14

## 2022-11-14 PROBLEM — M46.96 UNSPECIFIED INFLAMMATORY SPONDYLOPATHY, LUMBAR REGION (HCC): Status: RESOLVED | Noted: 2020-06-19 | Resolved: 2022-11-14

## 2022-11-14 PROBLEM — F11.20 OPIOID DEPENDENCE, UNCOMPLICATED (HCC): Status: RESOLVED | Noted: 2021-06-10 | Resolved: 2022-11-14

## 2022-11-14 PROCEDURE — 1123F ACP DISCUSS/DSCN MKR DOCD: CPT | Performed by: FAMILY MEDICINE

## 2022-11-14 PROCEDURE — G0008 ADMIN INFLUENZA VIRUS VAC: HCPCS | Performed by: FAMILY MEDICINE

## 2022-11-14 PROCEDURE — 90694 VACC AIIV4 NO PRSRV 0.5ML IM: CPT | Performed by: FAMILY MEDICINE

## 2022-11-14 PROCEDURE — G0439 PPPS, SUBSEQ VISIT: HCPCS | Performed by: FAMILY MEDICINE

## 2022-11-14 SDOH — ECONOMIC STABILITY: FOOD INSECURITY: WITHIN THE PAST 12 MONTHS, THE FOOD YOU BOUGHT JUST DIDN'T LAST AND YOU DIDN'T HAVE MONEY TO GET MORE.: NEVER TRUE

## 2022-11-14 SDOH — ECONOMIC STABILITY: FOOD INSECURITY: WITHIN THE PAST 12 MONTHS, YOU WORRIED THAT YOUR FOOD WOULD RUN OUT BEFORE YOU GOT MONEY TO BUY MORE.: NEVER TRUE

## 2022-11-14 ASSESSMENT — PATIENT HEALTH QUESTIONNAIRE - PHQ9
SUM OF ALL RESPONSES TO PHQ9 QUESTIONS 1 & 2: 0
SUM OF ALL RESPONSES TO PHQ QUESTIONS 1-9: 0
SUM OF ALL RESPONSES TO PHQ QUESTIONS 1-9: 0
2. FEELING DOWN, DEPRESSED OR HOPELESS: 0
1. LITTLE INTEREST OR PLEASURE IN DOING THINGS: 0
SUM OF ALL RESPONSES TO PHQ QUESTIONS 1-9: 0
SUM OF ALL RESPONSES TO PHQ QUESTIONS 1-9: 0

## 2022-11-14 ASSESSMENT — LIFESTYLE VARIABLES
HOW OFTEN DO YOU HAVE A DRINK CONTAINING ALCOHOL: MONTHLY OR LESS
HOW MANY STANDARD DRINKS CONTAINING ALCOHOL DO YOU HAVE ON A TYPICAL DAY: 1 OR 2

## 2022-11-14 ASSESSMENT — SOCIAL DETERMINANTS OF HEALTH (SDOH): HOW HARD IS IT FOR YOU TO PAY FOR THE VERY BASICS LIKE FOOD, HOUSING, MEDICAL CARE, AND HEATING?: NOT HARD AT ALL

## 2022-11-14 NOTE — PATIENT INSTRUCTIONS
Personalized Preventive Plan for Elyssa Harris - 11/14/2022  Medicare offers a range of preventive health benefits. Some of the tests and screenings are paid in full while other may be subject to a deductible, co-insurance, and/or copay. Some of these benefits include a comprehensive review of your medical history including lifestyle, illnesses that may run in your family, and various assessments and screenings as appropriate. After reviewing your medical record and screening and assessments performed today your provider may have ordered immunizations, labs, imaging, and/or referrals for you. A list of these orders (if applicable) as well as your Preventive Care list are included within your After Visit Summary for your review. Other Preventive Recommendations:    A preventive eye exam performed by an eye specialist is recommended every 1-2 years to screen for glaucoma; cataracts, macular degeneration, and other eye disorders. A preventive dental visit is recommended every 6 months. Try to get at least 150 minutes of exercise per week or 10,000 steps per day on a pedometer . Order or download the FREE \"Exercise & Physical Activity: Your Everyday Guide\" from [employer]. Call 6-177.270.9699 or search [employer] online. You need 2490-2708 mg of calcium and 7815-6108 IU of vitamin D per day. It is possible to meet your calcium requirement with diet alone, but a vitamin D supplement is usually necessary to meet this goal.  When exposed to the sun, use a sunscreen that protects against both UVA and UVB radiation with an SPF of 30 or greater. Reapply every 2 to 3 hours or after sweating, drying off with a towel, or swimming. Always wear a seat belt when traveling in a car. Always wear a helmet when riding a bicycle or motorcycle.

## 2022-11-14 NOTE — PROGRESS NOTES
Medicare Annual Wellness Visit    Flores Elder is here for Medicare AWV    Assessment & Plan   Medicare annual wellness visit, subsequent  Recommendations for Preventive Services Due: see orders and patient instructions/AVS.  Recommended screening schedule for the next 5-10 years is provided to the patient in written form: see Patient Instructions/AVS.  Hyperlipidemia, unspecified hyperlipidemia type  -     Lipid Panel  -     TSH with Reflex  -     Comprehensive Metabolic Panel  -     CBC with Auto Differential  Essential hypertension  -     Lipid Panel  -     TSH with Reflex  -     Comprehensive Metabolic Panel  -     CBC with Auto Differential  Hypercalcemia  -     Vitamin D 25 Hydroxy  -     Comprehensive Metabolic Panel  -     PTH, Intact  Elevated glucose  -     Comprehensive Metabolic Panel  -     Hemoglobin A1C  Cervical myelopathy (HCC)    Stable, continue to follow up with pain management. Return in about 6 months (around 5/14/2023) for Hypertension, Hyperlipidemia. Subjective     Patient's complete Health Risk Assessment and screening values have been reviewed and are found in Flowsheets. The following problems were reviewed today and where indicated follow up appointments were made and/or referrals ordered.     Positive Risk Factor Screenings with Interventions:    Fall Risk:  Do you feel unsteady or are you worried about falling? : no  2 or more falls in past year?: no  Fall with injury in past year?: (!) yes   Fall Risk Interventions:    She has already done PT and no falls since that time, now walks with marching steps         Health Habits/Nutrition:  Physical Activity: Insufficiently Active    Days of Exercise per Week: 6 days    Minutes of Exercise per Session: 20 min     Have you lost any weight without trying in the past 3 months?: (!) Yes  Body mass index: (!) 34.2  Have you seen the dentist within the past year?: Yes  Health Habits/Nutrition Interventions:  Weight is the same as 1 year ago on our scale    Hearing/Vision:  Do you or your family notice any trouble with your hearing that hasn't been managed with hearing aids?: (!) Yes  Do you have difficulty driving, watching TV, or doing any of your daily activities because of your eyesight?: No  Have you had an eye exam within the past year?: Yes  No results found. Hearing/Vision Interventions:  Hearing concerns:  already has hearing aids, sometimes better if she takes them out            Objective   Vitals:    11/14/22 1306   BP: 130/60   Site: Left Upper Arm   Position: Sitting   Cuff Size: Large Adult   Pulse: 74   SpO2: 95%   Weight: 187 lb (84.8 kg)   Height: 5' 2\" (1.575 m)      Body mass index is 34.2 kg/m². General Appearance: alert and oriented to person, place and time, well-developed and well-nourished, in no acute distress  Pulmonary/Chest: clear to auscultation bilaterally- no wheezes, rales or rhonchi, normal air movement, no respiratory distress  Cardiovascular: normal rate, normal S1 and S2, no gallops, and no carotid bruits  Abdomen: soft, non-tender, non-distended, normal bowel sounds, no masses or organomegaly  Extremities: no cyanosis and no clubbing       Allergies   Allergen Reactions    Cipro Xr Hives    Clindamycin/Lincomycin Hives     Prior to Visit Medications    Medication Sig Taking? Authorizing Provider   simvastatin (ZOCOR) 40 MG tablet TAKE 1 TABLET NIGHTLY Yes JUSTIN Molina CNP   lisinopril-hydroCHLOROthiazide (PRINZIDE;ZESTORETIC) 20-25 MG per tablet TAKE 1 TABLET DAILY Yes JUSTIN Molina CNP   metoprolol tartrate (LOPRESSOR) 25 MG tablet TAKE 1 TABLET TWICE A DAY Yes JUSTIN Nelson CNP   oxyCODONE-acetaminophen (PERCOCET) 5-325 MG per tablet Take 1 tablet by mouth 3 times daily for 30 days.  Yes JUSTIN Spicer CNP   fluticasone (FLONASE) 50 MCG/ACT nasal spray USE 2 SPRAYS NASALLY DAILY Yes JUSTIN Lopez   albuterol sulfate  (90 Base) MCG/ACT inhaler INHALE 1-2 PUFFS INTO THE LUNGS EVERY 6 HOURS AS NEEDED FOR WHEEZING OR SHORTNESS OF BREATH Yes JUSTIN Lopez   mometasone (NASONEX) 50 MCG/ACT nasal spray 2 sprays by Nasal route daily Yes Brock Essex, MD   estradiol (ESTRACE) 0.1 MG/GM vaginal cream  Yes Historical Provider, MD   Handicap Placard MISC by Does not apply route Yes Brock Essex, MD   Vibegron (GEMTESA) 75 MG TABS Take 75 mg by mouth daily Yes Historical Provider, MD   albuterol sulfate (PROAIR RESPICLICK) 586 (90 Base) MCG/ACT aerosol powder inhalation Inhale 1-2 puffs into the lungs every 4 hours as needed for Wheezing or Shortness of Breath Yes Brock Essex, MD   Polyethylene Glycol 3350 (MIRALAX PO) Take by mouth daily Yes Historical Provider, MD   Blood Pressure KIT Check blood pressures and bring log to next visit Yes Emi Myles,    aspirin 81 MG tablet Take 81 mg by mouth daily Yes Historical Provider, MD   Biotin 1 MG CAPS Take 1 capsule by mouth daily Yes Historical Provider, MD   Calcium Carbonate-Vitamin D (CALCIUM + D PO) Take 600 mg by mouth daily  Yes Historical Provider, MD   Multiple Vitamins-Minerals (MULTI FOR HER PO) Take  by mouth. Yes Historical Provider, MD   oxyCODONE-acetaminophen (PERCOCET) 5-325 MG per tablet Take 1 tablet by mouth 3 times daily for 30 days. JUSTIN Cloud CNP   oxyCODONE-acetaminophen (PERCOCET) 5-325 MG per tablet Take 1 tablet by mouth 3 times daily for 30 days.   JUSTIN Cloud CNP       CareTeam (Including outside providers/suppliers regularly involved in providing care):   Patient Care Team:  Brock Essex, MD as PCP - General (Family Medicine)  Brock Essex, MD as PCP - [facility] Empaneled Provider  Fanny Gutierrez MD as Consulting Physician ([facility])  Quinten Snowden MD as Consulting Physician (Urology)  Moraima Lincoln MD as Consulting Physician (Gastroenterology)  Carolyn Mai, LAVON as Ambulatory Care Manager     Reviewed and updated this visit:  Tobacco  Allergies  Meds  Problems  Med Hx  Surg Hx  Soc Hx  Fam Hx

## 2022-11-15 LAB
A/G RATIO: 1.7 (ref 1.1–2.2)
ALBUMIN SERPL-MCNC: 4.5 G/DL (ref 3.4–5)
ALP BLD-CCNC: 79 U/L (ref 40–129)
ALT SERPL-CCNC: 21 U/L (ref 10–40)
ANION GAP SERPL CALCULATED.3IONS-SCNC: 15 MMOL/L (ref 3–16)
AST SERPL-CCNC: 22 U/L (ref 15–37)
BASOPHILS ABSOLUTE: 0.1 K/UL (ref 0–0.2)
BASOPHILS RELATIVE PERCENT: 0.8 %
BILIRUB SERPL-MCNC: 0.3 MG/DL (ref 0–1)
BUN BLDV-MCNC: 25 MG/DL (ref 7–20)
CALCIUM SERPL-MCNC: 11 MG/DL (ref 8.3–10.6)
CHLORIDE BLD-SCNC: 104 MMOL/L (ref 99–110)
CHOLESTEROL, TOTAL: 137 MG/DL (ref 0–199)
CO2: 25 MMOL/L (ref 21–32)
CREAT SERPL-MCNC: 0.9 MG/DL (ref 0.6–1.2)
EOSINOPHILS ABSOLUTE: 0.3 K/UL (ref 0–0.6)
EOSINOPHILS RELATIVE PERCENT: 3.5 %
ESTIMATED AVERAGE GLUCOSE: 114 MG/DL
GFR SERPL CREATININE-BSD FRML MDRD: >60 ML/MIN/{1.73_M2}
GLUCOSE BLD-MCNC: 107 MG/DL (ref 70–99)
HBA1C MFR BLD: 5.6 %
HCT VFR BLD CALC: 35.3 % (ref 36–48)
HDLC SERPL-MCNC: 35 MG/DL (ref 40–60)
HEMOGLOBIN: 11.7 G/DL (ref 12–16)
LDL CHOLESTEROL CALCULATED: 61 MG/DL
LYMPHOCYTES ABSOLUTE: 1.7 K/UL (ref 1–5.1)
LYMPHOCYTES RELATIVE PERCENT: 22.5 %
MCH RBC QN AUTO: 30.4 PG (ref 26–34)
MCHC RBC AUTO-ENTMCNC: 33.1 G/DL (ref 31–36)
MCV RBC AUTO: 91.9 FL (ref 80–100)
MONOCYTES ABSOLUTE: 0.4 K/UL (ref 0–1.3)
MONOCYTES RELATIVE PERCENT: 5.7 %
NEUTROPHILS ABSOLUTE: 5.1 K/UL (ref 1.7–7.7)
NEUTROPHILS RELATIVE PERCENT: 67.5 %
PARATHYROID HORMONE INTACT: 59.8 PG/ML (ref 14–72)
PDW BLD-RTO: 13.8 % (ref 12.4–15.4)
PLATELET # BLD: 206 K/UL (ref 135–450)
PMV BLD AUTO: 9.6 FL (ref 5–10.5)
POTASSIUM SERPL-SCNC: 4.1 MMOL/L (ref 3.5–5.1)
RBC # BLD: 3.84 M/UL (ref 4–5.2)
SODIUM BLD-SCNC: 144 MMOL/L (ref 136–145)
TOTAL PROTEIN: 7.2 G/DL (ref 6.4–8.2)
TRIGL SERPL-MCNC: 203 MG/DL (ref 0–150)
TSH REFLEX: 1.83 UIU/ML (ref 0.27–4.2)
VITAMIN D 25-HYDROXY: 36.5 NG/ML
VLDLC SERPL CALC-MCNC: 41 MG/DL
WBC # BLD: 7.6 K/UL (ref 4–11)

## 2022-11-16 DIAGNOSIS — E83.52 HYPERCALCEMIA: ICD-10-CM

## 2022-11-16 DIAGNOSIS — E83.52 HYPERCALCEMIA: Primary | ICD-10-CM

## 2022-11-16 LAB — PHOSPHORUS: 3.2 MG/DL (ref 2.5–4.9)

## 2022-11-16 RX ORDER — LISINOPRIL 30 MG/1
30 TABLET ORAL DAILY
Qty: 90 TABLET | Refills: 1 | Status: SHIPPED | OUTPATIENT
Start: 2022-11-16

## 2022-11-18 LAB
ALBUMIN SERPL-MCNC: 3.4 G/DL (ref 3.1–4.9)
ALPHA-1-GLOBULIN: 0.2 G/DL (ref 0.2–0.4)
ALPHA-2-GLOBULIN: 0.8 G/DL (ref 0.4–1.1)
BETA GLOBULIN: 1.1 G/DL (ref 0.9–1.6)
GAMMA GLOBULIN: 1.3 G/DL (ref 0.6–1.8)
SPE/IFE INTERPRETATION: NORMAL
TOTAL PROTEIN: 6.8 G/DL (ref 6.4–8.2)

## 2022-11-21 DIAGNOSIS — E83.52 HYPERCALCEMIA: ICD-10-CM

## 2022-11-22 LAB
PROTEIN PROTEIN: 0.02 G/DL
PROTEIN PROTEIN: 20 MG/DL

## 2022-11-23 LAB — URINE ELECTROPHORESIS INTERP: NORMAL

## 2022-12-05 ENCOUNTER — TELEPHONE (OUTPATIENT)
Dept: FAMILY MEDICINE CLINIC | Age: 87
End: 2022-12-05

## 2022-12-05 ENCOUNTER — OFFICE VISIT (OUTPATIENT)
Dept: FAMILY MEDICINE CLINIC | Age: 87
End: 2022-12-05
Payer: MEDICARE

## 2022-12-05 VITALS — BODY MASS INDEX: 34.04 KG/M2 | WEIGHT: 185 LBS | HEIGHT: 62 IN

## 2022-12-05 DIAGNOSIS — B96.89 ACUTE BACTERIAL SINUSITIS: Primary | ICD-10-CM

## 2022-12-05 DIAGNOSIS — J01.90 ACUTE BACTERIAL SINUSITIS: Primary | ICD-10-CM

## 2022-12-05 PROCEDURE — 99213 OFFICE O/P EST LOW 20 MIN: CPT | Performed by: NURSE PRACTITIONER

## 2022-12-05 PROCEDURE — 1123F ACP DISCUSS/DSCN MKR DOCD: CPT | Performed by: NURSE PRACTITIONER

## 2022-12-05 RX ORDER — AZITHROMYCIN 250 MG/1
250 TABLET, FILM COATED ORAL SEE ADMIN INSTRUCTIONS
Qty: 6 TABLET | Refills: 0 | Status: SHIPPED | OUTPATIENT
Start: 2022-12-05 | End: 2022-12-10

## 2022-12-05 ASSESSMENT — ENCOUNTER SYMPTOMS
SINUS PAIN: 1
COUGH: 1
RHINORRHEA: 0
SHORTNESS OF BREATH: 0
DIARRHEA: 0
NAUSEA: 0
VOMITING: 0
SINUS PRESSURE: 1
SORE THROAT: 0

## 2022-12-05 NOTE — TELEPHONE ENCOUNTER
Called with complaint of sinus infection. Patient states that she has tried using a kiara pot. Also tried muscinex. Nothing is working so wants to be seen. I got her scheduled with carter at 4 o'clock.

## 2022-12-05 NOTE — PATIENT INSTRUCTIONS
Drink plenty of fluids   Get plenty of rest  Finish course of antibiotics   Take medications as prescribed   Go to nearest ER if develop shortness of breath, chest pain or significant worsening of symptoms   Notify office if symptoms worsen or do no improve  Tylenol as needed  Flonase daily   Neti pot   Warm air humidifier

## 2022-12-05 NOTE — PROGRESS NOTES
Aldo Drew (:  1934) is a 80 y.o. female,Established patient, here for evaluation of the following chief complaint(s):  Congestion (Symptoms started on 2022, congested nose, head feels pressure. )         ASSESSMENT/PLAN:  1. Acute bacterial sinusitis  -     azithromycin (ZITHROMAX) 250 MG tablet; Take 1 tablet by mouth See Admin Instructions for 5 days 500mg on day 1 followed by 250mg on days 2 - 5, Disp-6 tablet, R-0Normal  -Discussed alarm symptoms such as shortness of breth, difficulty breathing and chest pain. Go to ER if these develop. Patient verbalized understanding   Drink plenty of fluids   Get plenty of rest  Finish course of antibiotics   Take medications as prescribed   Go to nearest ER if develop shortness of breath, chest pain or significant worsening of symptoms   Notify office if symptoms worsen or do no improve  Tylenol as needed  Flonase daily   Neti pot   Warm air humidifier     Return if symptoms worsen or fail to improve. Subjective   SUBJECTIVE/OBJECTIVE:  Symptoms started on 22  Hs been using Mucinex and Neti pot for her symptoms. No sick contacts at home. Has had Flu shot this year   No PMH for asthma and COPD   Reports that she has a skunk problem at home and has been burning a lot of candles to cover the smell and feels this has contributed to her symptoms  States she gets yearly sinus infections         Review of Systems   Constitutional:  Negative for chills, fatigue and fever. HENT:  Positive for congestion, sinus pressure and sinus pain. Negative for ear pain, postnasal drip, rhinorrhea and sore throat. Clear nasal discharge when using neti pot      Respiratory:  Positive for cough. Negative for shortness of breath. Dry cough     Cardiovascular:  Negative for chest pain. Gastrointestinal:  Negative for diarrhea, nausea and vomiting. Musculoskeletal:  Negative for myalgias. Neurological:  Positive for headaches. Objective   Physical Exam  Vitals reviewed. Constitutional:       General: She is not in acute distress. HENT:      Head: Normocephalic. Right Ear: External ear normal. No decreased hearing noted. Left Ear: External ear normal. No decreased hearing noted. A middle ear effusion is present. Ears:      Comments: Large amount of cerumen in right ear but able to visualize the TM   Chronic hearing loss, wears bilateral hearing aids        Nose: Rhinorrhea present. Rhinorrhea is clear. Right Turbinates: Not swollen. Left Turbinates: Not swollen. Right Sinus: Frontal sinus tenderness present. No maxillary sinus tenderness. Left Sinus: Frontal sinus tenderness present. No maxillary sinus tenderness. Eyes:      Pupils: Pupils are equal, round, and reactive to light. Cardiovascular:      Rate and Rhythm: Normal rate and regular rhythm. Heart sounds: Normal heart sounds. Pulmonary:      Effort: Pulmonary effort is normal.      Breath sounds: Normal breath sounds. No wheezing, rhonchi or rales. Abdominal:      General: Abdomen is flat. Musculoskeletal:      Cervical back: Normal range of motion. Lymphadenopathy:      Cervical: No cervical adenopathy. Skin:     General: Skin is warm and dry. Capillary Refill: Capillary refill takes less than 2 seconds. Neurological:      General: No focal deficit present. Mental Status: She is alert. This note was generated completely or in part utilizing Dragon dictation speech recognition software. Occasionally, words are mistranscribed and despite editing, the text may contain inaccuracies due to incorrect word recognition. If further clarification is needed please contact the office at [phone]. An electronic signature was used to authenticate this note.     --JUSTIN Myers - GLENYS

## 2022-12-12 DIAGNOSIS — I10 ESSENTIAL HYPERTENSION: ICD-10-CM

## 2022-12-12 DIAGNOSIS — E78.5 HYPERLIPIDEMIA, UNSPECIFIED HYPERLIPIDEMIA TYPE: ICD-10-CM

## 2022-12-12 RX ORDER — SIMVASTATIN 40 MG
TABLET ORAL
Qty: 90 TABLET | Refills: 3 | Status: SHIPPED | OUTPATIENT
Start: 2022-12-12

## 2022-12-12 RX ORDER — LISINOPRIL 30 MG/1
30 TABLET ORAL DAILY
Qty: 90 TABLET | Refills: 3 | Status: SHIPPED | OUTPATIENT
Start: 2022-12-12

## 2022-12-12 NOTE — TELEPHONE ENCOUNTER
Refill Request     CONFIRM preferrred pharmacy with the patient. If Mail Order Rx - Pend for 90 day refill. Last Seen: Last Seen Department: 12/5/2022  Last Seen by PCP: 11/14/2022    Last Written: lisinopril 90 with 1 11/16/2022   Simvastatin  90 with 0 10/17/2022   Metroprolol 10/17/2022       If no future appointment scheduled, route STAFF MESSAGE with patient name to the [facility] for scheduling. Next Appointment:   Future Appointments   Date Time Provider Lacy Garrett   3/8/2023  9:30 AM JUSTIN King - CNP [facility] AND [facility]   11/14/2023  9:00 AM [facility]  [place] [facility]       Message sent to [address] [place] [address] to schedule appt with patient?   NO      Requested Prescriptions     Pending Prescriptions Disp Refills    simvastatin (ZOCOR) 40 MG tablet 90 tablet 0    metoprolol tartrate (LOPRESSOR) 25 MG tablet 180 tablet 0    lisinopril (PRINIVIL;ZESTRIL) 30 MG tablet 90 tablet 1     Sig: Take 1 tablet by mouth daily

## 2022-12-16 ENCOUNTER — TELEPHONE (OUTPATIENT)
Dept: FAMILY MEDICINE CLINIC | Age: 87
End: 2022-12-16

## 2022-12-16 RX ORDER — PREDNISONE 20 MG/1
20 TABLET ORAL DAILY
Qty: 5 TABLET | Refills: 0 | Status: SHIPPED | OUTPATIENT
Start: 2022-12-16 | End: 2022-12-21

## 2022-12-16 RX ORDER — AMOXICILLIN AND CLAVULANATE POTASSIUM 875; 125 MG/1; MG/1
1 TABLET, FILM COATED ORAL 2 TIMES DAILY
Qty: 20 TABLET | Refills: 0 | Status: SHIPPED | OUTPATIENT
Start: 2022-12-16 | End: 2022-12-26

## 2022-12-16 NOTE — TELEPHONE ENCOUNTER
Pt called in, states she is still having severe congestion even after taking the medication prescribed by carter.  Wanted to know if she could try something else

## 2022-12-16 NOTE — TELEPHONE ENCOUNTER
I sent in Augmentin and a small amount of oral steroid for her. If not better after that, she will need an in person appointment.

## 2022-12-19 LAB — PTH RELATED PEPTIDE: 4.2 PMOL/L (ref 0–3.4)

## 2023-01-02 ENCOUNTER — TELEPHONE (OUTPATIENT)
Dept: FAMILY MEDICINE CLINIC | Age: 88
End: 2023-01-02

## 2023-01-02 NOTE — TELEPHONE ENCOUNTER
On Call Provider Note:    Patient calling due to concern for bilateral leg swelling possibly due to Lisinopril  Has never had leg swelling before. Noted leg swelling 2 days ago and is asking for something to help. Leg swelling lasting throughout the day and she is waking up with it. Reports she was on Lisinopril/ HCTZ several months ago but it was stopped. No CP, SOB, leg swelling, change in voiding. Noted hypercalcemia on labs 11/2022 for which HCTZ was stopped. Instructed patient to keep leg propped up when she lays down in her recliner with 2 pillows, buy compression socks to wear throughout the day. Explained to patient bilateral leg swelling can be a symptom of multiple things and will need evaluation. Verbalized understanding and will try to make appt with Dr. Valeria Monreal tomorrow.

## 2023-01-03 NOTE — TELEPHONE ENCOUNTER
Patient is asking again for Lisinopril/ HCTZ 20/12.5 mg - states she has wore the compression stockings for the past several days and they are still not helping. Patient does not take her Lisinopril dosage today. Patient stated she will not take it unless its with the water pill.

## 2023-01-04 NOTE — TELEPHONE ENCOUNTER
Spoke with patient, she is going to call daughter to see if she can get to one of those.  Will call back

## 2023-01-05 ENCOUNTER — OFFICE VISIT (OUTPATIENT)
Dept: FAMILY MEDICINE CLINIC | Age: 88
End: 2023-01-05
Payer: MEDICARE

## 2023-01-05 VITALS
DIASTOLIC BLOOD PRESSURE: 82 MMHG | SYSTOLIC BLOOD PRESSURE: 122 MMHG | BODY MASS INDEX: 34.2 KG/M2 | OXYGEN SATURATION: 98 % | WEIGHT: 187 LBS | HEART RATE: 75 BPM

## 2023-01-05 DIAGNOSIS — R60.0 BILATERAL LOWER EXTREMITY EDEMA: Primary | ICD-10-CM

## 2023-01-05 DIAGNOSIS — I10 PRIMARY HYPERTENSION: ICD-10-CM

## 2023-01-05 DIAGNOSIS — E83.52 HYPERCALCEMIA: ICD-10-CM

## 2023-01-05 PROCEDURE — G8417 CALC BMI ABV UP PARAM F/U: HCPCS | Performed by: FAMILY MEDICINE

## 2023-01-05 PROCEDURE — 1036F TOBACCO NON-USER: CPT | Performed by: FAMILY MEDICINE

## 2023-01-05 PROCEDURE — 1123F ACP DISCUSS/DSCN MKR DOCD: CPT | Performed by: FAMILY MEDICINE

## 2023-01-05 PROCEDURE — 99214 OFFICE O/P EST MOD 30 MIN: CPT | Performed by: FAMILY MEDICINE

## 2023-01-05 PROCEDURE — G8484 FLU IMMUNIZE NO ADMIN: HCPCS | Performed by: FAMILY MEDICINE

## 2023-01-05 PROCEDURE — G8427 DOCREV CUR MEDS BY ELIG CLIN: HCPCS | Performed by: FAMILY MEDICINE

## 2023-01-05 PROCEDURE — 1090F PRES/ABSN URINE INCON ASSESS: CPT | Performed by: FAMILY MEDICINE

## 2023-01-05 RX ORDER — FLUTICASONE PROPIONATE 50 MCG
SPRAY, SUSPENSION (ML) NASAL
Qty: 48 G | Refills: 3 | Status: SHIPPED | OUTPATIENT
Start: 2023-01-05

## 2023-01-06 DIAGNOSIS — E83.52 HYPERCALCEMIA: ICD-10-CM

## 2023-01-07 LAB
ANION GAP SERPL CALCULATED.3IONS-SCNC: 9 MMOL/L (ref 3–16)
BUN BLDV-MCNC: 18 MG/DL (ref 7–20)
CALCIUM SERPL-MCNC: 10.7 MG/DL (ref 8.3–10.6)
CHLORIDE BLD-SCNC: 104 MMOL/L (ref 99–110)
CO2: 29 MMOL/L (ref 21–32)
CREAT SERPL-MCNC: 0.6 MG/DL (ref 0.6–1.2)
GFR SERPL CREATININE-BSD FRML MDRD: >60 ML/MIN/{1.73_M2}
GLUCOSE BLD-MCNC: 105 MG/DL (ref 70–99)
PARATHYROID HORMONE INTACT: 60.3 PG/ML (ref 14–72)
POTASSIUM SERPL-SCNC: 4.1 MMOL/L (ref 3.5–5.1)
SODIUM BLD-SCNC: 142 MMOL/L (ref 136–145)

## 2023-01-09 ASSESSMENT — ENCOUNTER SYMPTOMS: SHORTNESS OF BREATH: 0

## 2023-01-09 NOTE — PROGRESS NOTES
Jenn Garcias (:  1934) is a 80 y.o. female,Established patient, here for evaluation of the following chief complaint(s):  Leg Swelling (Started 23 bilateral)         ASSESSMENT/PLAN:  1. Bilateral lower extremity edema   Recommendations: decrease sodium in the diet, elevate feet above the level of the heart whenever possible, increase physical activity, and use of compression stockings. Will recheck calcium and PTH, PTHrP. If normal now, will do trial of non-thiazide diuretic. 2. Primary hypertension  Currently controlled, but likely lisinopril effects have not yet dissipated. Will recheck calcium level. If back to normal, then suspect this was from HCTZ and alternative diuretic will be prescribed. 3. Hypercalcemia  -     PTH, Intact; Future  -     Basic Metabolic Panel; Future  -     PTH-RELATED PEPTIDE; Future      No follow-ups on file. Subjective   SUBJECTIVE/OBJECTIVE:   Chief Complaint   Patient presents with    Leg Swelling     Started 23 bilateral       HPI Edema: Patient complains of persistent edema which has been improving with time. Location of edema: bilateral lower extremities (lower legs, ankle). The edema is present in the afternoon, in the evening, and at bedtime. The swelling has been aggravated by dependency of involved area, relieved by elevation of involved area, and has been associated with nothing. Patient denies shortness of breath, chest pain, and palpitations. Previous history of similar symptoms: No.  Risk factors:  none. Previous work-up: No. HCTZ was recently removed from lisinopril due to hypercalcemia. She notes swelling started after that. She thought the swelling was caused by lisinopril, so she stopped it about 4 days ago. She notes mild improvement in swelling since stopping lisinopril. Review of Systems   Constitutional:  Negative for unexpected weight change. Respiratory:  Negative for shortness of breath.     Cardiovascular:  Positive for leg swelling. Negative for chest pain. Objective   Vitals:    23 1611   BP: 122/82   Site: Right Upper Arm   Position: Sitting   Cuff Size: Small Adult   Pulse: 75   SpO2: 98%   Weight: 187 lb (84.8 kg)      Physical Exam  Vitals and nursing note reviewed. Constitutional:       General: She is not in acute distress. Appearance: She is well-developed. She is not diaphoretic. HENT:      Head: Normocephalic and atraumatic. Eyes:      General: No scleral icterus. Conjunctiva/sclera: Conjunctivae normal.   Cardiovascular:      Rate and Rhythm: Normal rate and regular rhythm. Heart sounds: Normal heart sounds. No murmur heard. No friction rub. No gallop. Pulmonary:      Effort: Pulmonary effort is normal. No respiratory distress. Breath sounds: Normal breath sounds. No wheezing or rales. Abdominal:      General: Bowel sounds are normal. There is no distension. Palpations: Abdomen is soft. There is no mass. Tenderness: There is no abdominal tenderness. There is no guarding or rebound. Musculoskeletal:      Right lower le+ Pitting Edema present. Left lower le+ Pitting Edema present. Neurological:      Mental Status: She is alert.         Lab Review   Orders Only on 2022   Component Date Value    24hr Urine Volume (ml) 2022 1700     Calcium, 24H Urine 2022 53     Creatinine, 24H Ur 2022 1.0    Orders Only on 2022   Component Date Value    Protein, Ur 2022 20.00 (A)     Protein, Ur 2022 0.020     PTH Related Peptide 2022 4.2 (A)     UR Electrophoresis Int 2022 REVIEWED    Orders Only on 2022   Component Date Value    Total Protein 2022 6.8     Albumin 2022 3.4     Alpha-1-Globulin 2022 0.2     Alpha-2-Globulin 2022 0.8     Beta Globulin 2022 1.1     Gamma Globulin 2022 1.3     Phosphorus 2022 3.2     SPE/YAJAIRA Interpretation 2022 REVIEWED    Office Visit on 11/14/2022   Component Date Value    Vit D, 25-Hydroxy 11/14/2022 36.5     Cholesterol, Total 11/14/2022 137     Triglycerides 11/14/2022 203 (A)     HDL 11/14/2022 35 (A)     LDL Calculated 11/14/2022 61     VLDL Cholesterol Calcula* 11/14/2022 41     TSH 11/14/2022 1.83     Sodium 11/14/2022 144     Potassium 11/14/2022 4.1     Chloride 11/14/2022 104     CO2 11/14/2022 25     Anion Gap 11/14/2022 15     Glucose 11/14/2022 107 (A)     BUN 11/14/2022 25 (A)     Creatinine 11/14/2022 0.9     Est, Glom Filt Rate 11/14/2022 >60     Calcium 11/14/2022 11.0 (A)     Total Protein 11/14/2022 7.2     Albumin 11/14/2022 4.5     Albumin/Globulin Ratio 11/14/2022 1.7     Total Bilirubin 11/14/2022 0.3     Alkaline Phosphatase 11/14/2022 79     ALT 11/14/2022 21     AST 11/14/2022 22     WBC 11/14/2022 7.6     RBC 11/14/2022 3.84 (A)     Hemoglobin 11/14/2022 11.7 (A)     Hematocrit 11/14/2022 35.3 (A)     MCV 11/14/2022 91.9     MCH 11/14/2022 30.4     MCHC 11/14/2022 33.1     RDW 11/14/2022 13.8     Platelets 37/52/2654 206     MPV 11/14/2022 9.6     Neutrophils % 11/14/2022 67.5     Lymphocytes % 11/14/2022 22.5     Monocytes % 11/14/2022 5.7     Eosinophils % 11/14/2022 3.5     Basophils % 11/14/2022 0.8     Neutrophils Absolute 11/14/2022 5.1     Lymphocytes Absolute 11/14/2022 1.7     Monocytes Absolute 11/14/2022 0.4     Eosinophils Absolute 11/14/2022 0.3     Basophils Absolute 11/14/2022 0.1     PTH 11/14/2022 59.8     Hemoglobin A1C 11/14/2022 5.6     eAG 11/14/2022 114.0            An electronic signature was used to authenticate this note.     --Rayne Woodruff MD

## 2023-01-10 ENCOUNTER — TELEPHONE (OUTPATIENT)
Dept: FAMILY MEDICINE CLINIC | Age: 88
End: 2023-01-10

## 2023-01-10 RX ORDER — NITROFURANTOIN 25; 75 MG/1; MG/1
100 CAPSULE ORAL 2 TIMES DAILY
Qty: 20 CAPSULE | Refills: 0 | Status: SHIPPED | OUTPATIENT
Start: 2023-01-10 | End: 2023-01-15

## 2023-01-10 NOTE — TELEPHONE ENCOUNTER
Patient states she is going to call her daughter to see if her daughter can bring her today, she will call back.

## 2023-01-10 NOTE — TELEPHONE ENCOUNTER
Patient states that she has a UTI again. Frequent urination and Burning. Patient states that she gets chronic UTI's. Patient would like an antibiotic sent in. Can send to [facility] on [facility].

## 2023-01-11 DIAGNOSIS — E83.52 HYPERCALCEMIA: Primary | ICD-10-CM

## 2023-01-13 RX ORDER — SULFAMETHOXAZOLE AND TRIMETHOPRIM 800; 160 MG/1; MG/1
1 TABLET ORAL 2 TIMES DAILY
Qty: 6 TABLET | Refills: 0 | Status: SHIPPED | OUTPATIENT
Start: 2023-01-13 | End: 2023-01-16

## 2023-01-13 NOTE — TELEPHONE ENCOUNTER
Patient is reporting getting sick, and is thinking it is due to the antibiotics. Her daughter picked it up a couple days ago.

## 2023-01-13 NOTE — TELEPHONE ENCOUNTER
Have her stop macrobid, start Bactrim. I recommend being seen if her symptoms do not resolve over the weekend.

## 2023-01-16 ENCOUNTER — TELEPHONE (OUTPATIENT)
Dept: FAMILY MEDICINE CLINIC | Age: 88
End: 2023-01-16

## 2023-01-16 RX ORDER — TORSEMIDE 5 MG/1
5 TABLET ORAL DAILY
Qty: 30 TABLET | Refills: 3 | Status: SHIPPED | OUTPATIENT
Start: 2023-01-16

## 2023-01-16 NOTE — TELEPHONE ENCOUNTER
Pt called stating Dr. Bossman Aburto was supposed to send in a water pill for her leg swelling issue at her appointment on 1/5/23. The patient states she has never received this. Pended pharmacy.    Please advise

## 2023-01-19 LAB — PTH RELATED PEPTIDE: 6 PMOL/L (ref 0–3.4)

## 2023-01-25 ENCOUNTER — TELEPHONE (OUTPATIENT)
Dept: FAMILY MEDICINE CLINIC | Age: 88
End: 2023-01-25

## 2023-01-25 NOTE — TELEPHONE ENCOUNTER
Received phone call from the pt in regards to her leg swelling that she was to discontinue the lisinopril and take the torsemide to help with leg swelling pt is asking if she needs to restart her lisinopril medication please advise.      Good call back #: [phone]

## 2023-01-25 NOTE — TELEPHONE ENCOUNTER
Received phone call from the pt she stated that she found her BP machine and her BP is reading 163/69 pulse 67

## 2023-01-25 NOTE — TELEPHONE ENCOUNTER
Spoke to patient she said the leg swelling has gone down, no more swelling at all.  She has no way to check her BP either right now

## 2023-01-26 NOTE — TELEPHONE ENCOUNTER
Patient aware states she will call us back to see when she is able to find transportation for an appt.

## 2023-02-07 ENCOUNTER — OFFICE VISIT (OUTPATIENT)
Dept: FAMILY MEDICINE CLINIC | Age: 88
End: 2023-02-07

## 2023-02-07 VITALS
BODY MASS INDEX: 33.65 KG/M2 | HEART RATE: 73 BPM | SYSTOLIC BLOOD PRESSURE: 132 MMHG | WEIGHT: 184 LBS | DIASTOLIC BLOOD PRESSURE: 80 MMHG | OXYGEN SATURATION: 98 %

## 2023-02-07 DIAGNOSIS — E83.52 HYPERCALCEMIA: ICD-10-CM

## 2023-02-07 DIAGNOSIS — R79.89 ELEVATED PARATHYROID HORMONE RELATED PEPTIDE LEVEL: ICD-10-CM

## 2023-02-07 DIAGNOSIS — E21.3 HYPERPARATHYROIDISM (HCC): ICD-10-CM

## 2023-02-07 DIAGNOSIS — G95.9 CERVICAL MYELOPATHY (HCC): ICD-10-CM

## 2023-02-07 DIAGNOSIS — I10 ESSENTIAL HYPERTENSION: Primary | ICD-10-CM

## 2023-02-07 RX ORDER — LISINOPRIL AND HYDROCHLOROTHIAZIDE 25; 20 MG/1; MG/1
TABLET ORAL
Qty: 90 TABLET | Refills: 0 | Status: SHIPPED | OUTPATIENT
Start: 2023-02-07

## 2023-02-07 RX ORDER — TORSEMIDE 5 MG/1
TABLET ORAL
Qty: 30 TABLET | Refills: 3 | OUTPATIENT
Start: 2023-02-07

## 2023-02-07 NOTE — TELEPHONE ENCOUNTER
Refill Request     CONFIRM preferrred pharmacy with the patient. If Mail Order Rx - Pend for 90 day refill. Last Seen: Last Seen Department: 1/5/2023  Last Seen by PCP: 1/5/2023    Last Written: 01/16/2023 30 tablet 3 refills     If no future appointment scheduled, route STAFF MESSAGE with patient name to the [facility] for scheduling. Next Appointment:   Future Appointments   Date Time Provider Lacy Garrett   2/7/2023  4:00 PM MD OWEN Arceo - [facility]   3/8/2023  9:30 AM JUSTIN Iqbal - CNP AFL TSP AND AFL Tri [facility]   11/14/2023  9:00 AM MD OWEN Arceo  [place] - [place]       Message sent to [employer] to schedule appt with patient?   NO      Requested Prescriptions     Pending Prescriptions Disp Refills    torsemide (DEMADEX) 5 MG tablet [Pharmacy Med Name: TORSEMIDE 5 MG TABLET] 30 tablet 3     Sig: TAKE 1 TABLET BY MOUTH EVERY DAY

## 2023-02-07 NOTE — PROGRESS NOTES
Jt Magaña (:  1934) is a 80 y.o. female,Established patient, here for evaluation of the following chief complaint(s):  Hypertension         ASSESSMENT/PLAN:  1. Essential hypertension  -     lisinopril-hydroCHLOROthiazide (PRINZIDE;ZESTORETIC) 20-25 MG per tablet; TAKE 1 TABLET DAILY, Disp-90 tablet, R-0Normal  Per patient's request, will go back to Prinzide as it appears that HCTZ is not causing the hypercalcemia and she does not wish to take any other diuretic. D/c torsemide. 2. Hypercalcemia  -     External Referral to Endocrinology  Likely hyperparathyroidism, but also has elevated phrp elevation, so may be multifactorial. Discussed the importance of further work and risks of hypercalcemia. Discussed parathyroid scan and bone scan. Vitamin D was WNL. She will meet with endo first before deciding on further work up. 3. Hyperparathyroidism ([facility] [address]  -     External Referral to Endocrinology  4. Elevated parathyroid hormone related peptide level  -     External Referral to Endocrinology  5. Cervical myelopathy (HCC)  Stable, in pain management, will continue. No follow-ups on file. Subjective   SUBJECTIVE/OBJECTIVE:  Chief Complaint   Patient presents with    Hypertension       HPI Hypertension:  Home blood pressure monitoring: Yes - was noted to be high at home, 163/69 while only taking torsemide. She was instructed to add back lisinopril and follow up here in 2 weeks, which she has done. She was originally taken off of Prinzide as it was felt HCTZ could have been causing hypercalcemia. She was put on lisinopril alone, but then developed leg edema and requested a diuretic. She was placed on torsemide, which eliminated the edema, but she reports it has increased her urinary incontinence so she would like to get off of it. Antihypertensive medication side effects: excessive urination. Use of agents associated with hypertension: estrogens.                                         Sodium (mmol/L)   Date Value   01/06/2023 142    BUN (mg/dL)   Date Value   01/06/2023 18    Glucose (mg/dL)   Date Value   01/06/2023 105 (H)      Potassium (mmol/L)   Date Value   01/06/2023 4.1    Creatinine (mg/dL)   Date Value   01/06/2023 0.6          She has been found recently to have hypercalcemia, which did not resolve after discontinuing HCTZ. Her parathyroid hormone levels have been inappropriately in the high level of normal range. She was also found to have elevated parathyroid hormone related peptide. She is is not interested in pursuing this much. She was advised to meet with an endocrinologist, but she states she is interested in seeing another doctor. Review of Systems   Cardiovascular:  Negative for leg swelling. Genitourinary:  Positive for enuresis and frequency. Musculoskeletal:  Positive for neck pain (chronic). Objective    Vitals:    02/07/23 1549   BP: 132/80   Site: Left Upper Arm   Position: Sitting   Cuff Size: Small Adult   Pulse: 73   SpO2: 98%   Weight: 184 lb (83.5 kg)      Physical Exam  Vitals and nursing note reviewed. Constitutional:       Appearance: Normal appearance. She is obese. Cardiovascular:      Rate and Rhythm: Normal rate and regular rhythm. Heart sounds: Normal heart sounds. Pulmonary:      Effort: Pulmonary effort is normal.      Breath sounds: Normal breath sounds. Musculoskeletal:         General: No swelling. Right lower leg: No edema. Left lower leg: No edema. Skin:     General: Skin is warm and dry. Neurological:      Mental Status: She is alert.    Psychiatric:         Speech: Speech normal.         Behavior: Behavior normal.        Lab Review   Erroneous Encounter on 01/10/2023   Component Date Value    Color, UA 01/10/2023 yellow     Clarity, UA 01/10/2023 cloudy     Glucose, UA POC 01/10/2023 neg     Bilirubin, UA 01/10/2023 neg     Ketones, UA 01/10/2023 neg     Spec Grav, UA 01/10/2023 1.010     Blood, UA POC 01/10/2023 small     pH, UA 01/10/2023 5.0     Protein, UA POC 01/10/2023 neg     Urobilinogen, UA 01/10/2023 0.2     Leukocytes, UA 01/10/2023 moderate     Nitrite, UA 01/10/2023 neg     Organism 01/10/2023 Citrobacter koseri (A)     Urine Culture, Routine 01/10/2023 >100,000 CFU/ml    Orders Only on 01/06/2023   Component Date Value    PTH Related Peptide 01/06/2023 6.0 (A)     Sodium 01/06/2023 142     Potassium 01/06/2023 4.1     Chloride 01/06/2023 104     CO2 01/06/2023 29     Anion Gap 01/06/2023 9     Glucose 01/06/2023 105 (A)     BUN 01/06/2023 18     Creatinine 01/06/2023 0.6     Est, Glom Filt Rate 01/06/2023 >60     Calcium 01/06/2023 10.7 (A)     PTH 01/06/2023 60.3    Orders Only on 11/29/2022   Component Date Value    24hr Urine Volume (ml) 11/29/2022 1700     Calcium, 24H Urine 11/29/2022 53     Creatinine, 24H Ur 11/29/2022 1.0    Orders Only on 11/21/2022   Component Date Value    Protein, Ur 11/21/2022 20.00 (A)     Protein, Ur 11/21/2022 0.020     PTH Related Peptide 11/21/2022 4.2 (A)     UR Electrophoresis Int 11/21/2022 REVIEWED    Orders Only on 11/16/2022   Component Date Value    Total Protein 11/14/2022 6.8     Albumin 11/14/2022 3.4     Alpha-1-Globulin 11/14/2022 0.2     Alpha-2-Globulin 11/14/2022 0.8     Beta Globulin 11/14/2022 1.1     Gamma Globulin 11/14/2022 1.3     Phosphorus 11/14/2022 3.2     SPE/YAJAIRA Interpretation 11/14/2022 REVIEWED    Office Visit on 11/14/2022   Component Date Value    Vit D, 25-Hydroxy 11/14/2022 36.5     Cholesterol, Total 11/14/2022 137     Triglycerides 11/14/2022 203 (A)     HDL 11/14/2022 35 (A)     LDL Calculated 11/14/2022 61     VLDL Cholesterol Calcula* 11/14/2022 41     TSH 11/14/2022 1.83     Sodium 11/14/2022 144     Potassium 11/14/2022 4.1     Chloride 11/14/2022 104     CO2 11/14/2022 25     Anion Gap 11/14/2022 15     Glucose 11/14/2022 107 (A)     BUN 11/14/2022 25 (A)     Creatinine 11/14/2022 0.9     Est, Glom Filt Rate 11/14/2022 >60     Calcium  11/14/2022 11.0 (A)     Total Protein 11/14/2022 7.2     Albumin 11/14/2022 4.5     Albumin/Globulin Ratio 11/14/2022 1.7     Total Bilirubin 11/14/2022 0.3     Alkaline Phosphatase 11/14/2022 79     ALT 11/14/2022 21     AST 11/14/2022 22     WBC 11/14/2022 7.6     RBC 11/14/2022 3.84 (A)     Hemoglobin 11/14/2022 11.7 (A)     Hematocrit 11/14/2022 35.3 (A)     MCV 11/14/2022 91.9     MCH 11/14/2022 30.4     MCHC 11/14/2022 33.1     RDW 11/14/2022 13.8     Platelets 60/78/3888 206     MPV 11/14/2022 9.6     Neutrophils % 11/14/2022 67.5     Lymphocytes % 11/14/2022 22.5     Monocytes % 11/14/2022 5.7     Eosinophils % 11/14/2022 3.5     Basophils % 11/14/2022 0.8     Neutrophils Absolute 11/14/2022 5.1     Lymphocytes Absolute 11/14/2022 1.7     Monocytes Absolute 11/14/2022 0.4     Eosinophils Absolute 11/14/2022 0.3     Basophils Absolute 11/14/2022 0.1     PTH 11/14/2022 59.8     Hemoglobin A1C 11/14/2022 5.6     eAG 11/14/2022 114.0              An electronic signature was used to authenticate this note.     --Ayaz Segal MD

## 2023-02-17 ENCOUNTER — TELEPHONE (OUTPATIENT)
Dept: FAMILY MEDICINE CLINIC | Age: 88
End: 2023-02-17

## 2023-02-22 DIAGNOSIS — J06.9 VIRAL URI: ICD-10-CM

## 2023-02-22 NOTE — TELEPHONE ENCOUNTER
Refill Request     CONFIRM preferrred pharmacy with the patient. If Mail Order Rx - Pend for 90 day refill. Last Seen: Last Seen Department: 2/7/2023  Last Seen by PCP: 2/7/2023    Last Written: albuterol 01/02/2022 18 each 2 refill    If no future appointment scheduled, route STAFF MESSAGE with patient name to the [facility] for scheduling. Next Appointment:   Future Appointments   Date Time Provider Lacy Garrett   3/8/2023  9:30 AM JUSTIN Goldman - CNP AFL TSP AND AFL Tri [facility]   11/14/2023  9:00 AM MD OWEN Neumann  [place] - [place]       Message sent to [address] [place] [address] to schedule appt with patient?   NO      Requested Prescriptions     Pending Prescriptions Disp Refills    albuterol sulfate HFA (PROVENTIL;VENTOLIN;PROAIR) 108 (90 Base) MCG/ACT inhaler 18 each 5     Sig: INHALE 1-2 PUFFS INTO THE LUNGS EVERY 6 HOURS AS NEEDED FOR WHEEZING OR SHORTNESS OF BREATH

## 2023-02-23 RX ORDER — ALBUTEROL SULFATE 90 UG/1
AEROSOL, METERED RESPIRATORY (INHALATION)
Qty: 18 EACH | Refills: 5 | Status: SHIPPED | OUTPATIENT
Start: 2023-02-23

## 2023-02-28 ENCOUNTER — TELEPHONE (OUTPATIENT)
Dept: FAMILY MEDICINE CLINIC | Age: 88
End: 2023-02-28

## 2023-02-28 ENCOUNTER — E-VISIT (OUTPATIENT)
Dept: FAMILY MEDICINE CLINIC | Age: 88
End: 2023-02-28
Payer: MEDICARE

## 2023-02-28 DIAGNOSIS — J06.9 VIRAL URI: Primary | ICD-10-CM

## 2023-02-28 DIAGNOSIS — J01.90 ACUTE SINUSITIS, RECURRENCE NOT SPECIFIED, UNSPECIFIED LOCATION: Primary | ICD-10-CM

## 2023-02-28 PROCEDURE — 99421 OL DIG E/M SVC 5-10 MIN: CPT | Performed by: FAMILY MEDICINE

## 2023-02-28 ASSESSMENT — LIFESTYLE VARIABLES: SMOKING_STATUS: NO, I'VE NEVER SMOKED

## 2023-02-28 NOTE — TELEPHONE ENCOUNTER
She can do an Evisit on her MyChart. I can send her a questionnaire. Otherwise, could do a video visit with a virtualist if needed.

## 2023-02-28 NOTE — TELEPHONE ENCOUNTER
Pt called in stating she has a sinus infection again and she was wanting something called in for her. Made her aware she'll most likely have to be seen but she said she has had this done for her before when she was sick with sinus issues. No fever, no chest pains, shortness of breath, etc. Only sinus . Pt also has questions about a 6 month follow up and if she needs to do that as well.

## 2023-03-10 ENCOUNTER — CARE COORDINATION (OUTPATIENT)
Dept: CARE COORDINATION | Age: 88
End: 2023-03-10

## 2023-03-13 ENCOUNTER — CARE COORDINATION (OUTPATIENT)
Dept: CARE COORDINATION | Age: 88
End: 2023-03-13

## 2023-03-13 NOTE — CARE COORDINATION
ACM called to and discussed Care Management with patient who declined at this time. ACM provided contact information if there were any additional needs that patient needed. Patient thanked ACM at this time.

## 2023-03-27 DIAGNOSIS — I10 ESSENTIAL HYPERTENSION: ICD-10-CM

## 2023-03-27 NOTE — TELEPHONE ENCOUNTER
Refill Request     CONFIRM preferred pharmacy with the patient. If Mail Order Rx - Pend for 90 day refill. Last Seen: Last Seen Department: 2/28/2023  Last Seen by PCP: 2/28/2023    Last Written: 2/7/2023 for #90 with no refills    If no future appointment scheduled, route STAFF MESSAGE with patient name to the [facility] for scheduling. Next Appointment:   Future Appointments   Date Time Provider Lacy Garrett   6/7/2023  9:30 AM JUSTIN Keene - CNP AFL TSP AND AFL Tri Stat   11/14/2023  9:00 AM Juan Miguel Arana MD [facility] [place] - D[facility]       Message sent to [address] [place] [address] to schedule appt with patient?   NO      Requested Prescriptions     Pending Prescriptions Disp Refills    lisinopril-hydroCHLOROthiazide (PRINZIDE;ZESTORETIC) 20-25 MG per tablet 90 tablet 0     Sig: TAKE 1 TABLET DAILY

## 2023-03-28 RX ORDER — LISINOPRIL AND HYDROCHLOROTHIAZIDE 25; 20 MG/1; MG/1
TABLET ORAL
Qty: 90 TABLET | Refills: 0 | Status: SHIPPED | OUTPATIENT
Start: 2023-03-28

## 2023-04-18 ENCOUNTER — TELEPHONE (OUTPATIENT)
Dept: FAMILY MEDICINE CLINIC | Age: 88
End: 2023-04-18

## 2023-04-18 NOTE — TELEPHONE ENCOUNTER
Incoming call from patient, recently seen audiology for hearing aids & they recommended seeing PCP for ear wax build up. Please advise in scheduling OV or nurse visit for ear flush.

## 2023-04-21 ENCOUNTER — TELEPHONE (OUTPATIENT)
Dept: FAMILY MEDICINE CLINIC | Age: 88
End: 2023-04-21

## 2023-04-27 DIAGNOSIS — I10 ESSENTIAL HYPERTENSION: ICD-10-CM

## 2023-04-28 ENCOUNTER — OFFICE VISIT (OUTPATIENT)
Dept: FAMILY MEDICINE CLINIC | Age: 88
End: 2023-04-28
Payer: MEDICARE

## 2023-04-28 VITALS
HEIGHT: 66 IN | BODY MASS INDEX: 28.93 KG/M2 | TEMPERATURE: 98.8 F | DIASTOLIC BLOOD PRESSURE: 82 MMHG | HEART RATE: 68 BPM | SYSTOLIC BLOOD PRESSURE: 128 MMHG | OXYGEN SATURATION: 93 % | WEIGHT: 180 LBS

## 2023-04-28 DIAGNOSIS — H65.02 NON-RECURRENT ACUTE SEROUS OTITIS MEDIA OF LEFT EAR: Primary | ICD-10-CM

## 2023-04-28 PROCEDURE — 1090F PRES/ABSN URINE INCON ASSESS: CPT | Performed by: STUDENT IN AN ORGANIZED HEALTH CARE EDUCATION/TRAINING PROGRAM

## 2023-04-28 PROCEDURE — G8417 CALC BMI ABV UP PARAM F/U: HCPCS | Performed by: STUDENT IN AN ORGANIZED HEALTH CARE EDUCATION/TRAINING PROGRAM

## 2023-04-28 PROCEDURE — 1036F TOBACCO NON-USER: CPT | Performed by: STUDENT IN AN ORGANIZED HEALTH CARE EDUCATION/TRAINING PROGRAM

## 2023-04-28 PROCEDURE — 1123F ACP DISCUSS/DSCN MKR DOCD: CPT | Performed by: STUDENT IN AN ORGANIZED HEALTH CARE EDUCATION/TRAINING PROGRAM

## 2023-04-28 PROCEDURE — G8427 DOCREV CUR MEDS BY ELIG CLIN: HCPCS | Performed by: STUDENT IN AN ORGANIZED HEALTH CARE EDUCATION/TRAINING PROGRAM

## 2023-04-28 PROCEDURE — 99213 OFFICE O/P EST LOW 20 MIN: CPT | Performed by: STUDENT IN AN ORGANIZED HEALTH CARE EDUCATION/TRAINING PROGRAM

## 2023-04-28 RX ORDER — METHYLPREDNISOLONE 4 MG/1
TABLET ORAL
Qty: 1 KIT | Refills: 0 | Status: SHIPPED | OUTPATIENT
Start: 2023-04-28 | End: 2023-05-04

## 2023-04-28 RX ORDER — LISINOPRIL AND HYDROCHLOROTHIAZIDE 25; 20 MG/1; MG/1
TABLET ORAL
Qty: 90 TABLET | Refills: 1 | Status: SHIPPED | OUTPATIENT
Start: 2023-04-28 | End: 2023-05-28

## 2023-04-28 SDOH — ECONOMIC STABILITY: INCOME INSECURITY: HOW HARD IS IT FOR YOU TO PAY FOR THE VERY BASICS LIKE FOOD, HOUSING, MEDICAL CARE, AND HEATING?: NOT HARD AT ALL

## 2023-04-28 SDOH — ECONOMIC STABILITY: FOOD INSECURITY: WITHIN THE PAST 12 MONTHS, THE FOOD YOU BOUGHT JUST DIDN'T LAST AND YOU DIDN'T HAVE MONEY TO GET MORE.: NEVER TRUE

## 2023-04-28 SDOH — ECONOMIC STABILITY: FOOD INSECURITY: WITHIN THE PAST 12 MONTHS, YOU WORRIED THAT YOUR FOOD WOULD RUN OUT BEFORE YOU GOT MONEY TO BUY MORE.: NEVER TRUE

## 2023-04-28 NOTE — PROGRESS NOTES
Movements: Extraocular movements intact. Pulmonary:      Effort: Pulmonary effort is normal.   Musculoskeletal:         General: Normal range of motion. Skin:     General: Skin is warm and dry. Neurological:      General: No focal deficit present. Mental Status: She is alert. Psychiatric:         Mood and Affect: Mood normal.       Plan  1. Non-recurrent acute serous otitis media of left ear  -     methylPREDNISolone (MEDROL DOSEPACK) 4 MG tablet; Take by mouth., Disp-1 kit, R-0Normal   - is no improvement, will refer to ENT        While assessing care for this patient, I have reviewed all pertinent lab work/imaging/ specialist notes and care in reference to those problems addressed above in detail. Appropriate medical decision making was based on this. Please note that portions of this note may have been completed with a voice recognition program. Efforts were made to edit the dictations but occasionally words are mis-transcribed. Return if symptoms worsen or fail to improve.     Shaun Lazar MD  4/28/2023

## 2023-04-28 NOTE — PATIENT INSTRUCTIONS
If not improved by the end of the medrol dose pack, please call our office to have a ENT referral placed

## 2023-05-04 ENCOUNTER — TELEPHONE (OUTPATIENT)
Dept: FAMILY MEDICINE CLINIC | Age: 88
End: 2023-05-04

## 2023-05-04 DIAGNOSIS — H65.92 OME (OTITIS MEDIA WITH EFFUSION), LEFT: Primary | ICD-10-CM

## 2023-05-04 NOTE — TELEPHONE ENCOUNTER
Telephone call regarding request for referral    If the patient has had recent visit with the provider AND discussed the referral during this visit then NO appointment is necessary. Route request to provider. Please advise patient that referrals may require an appointment be scheduled so that patient and PCP can review medical necessity for referral.     Is this a request or problem with Referral - Request for referral  Type of referral: ENT referral for acute serous otitis media of left ear      Preferred provider:  Verle Seip   Provider phone number - [phone]  Provider fax number - [phone]  Any provider patient does not wish to see? Who?- no      Appointment scheduled with PCP? :  No , already seen for this issue multiple times.      Future Appointments   Date Time Provider Lacy Garrett   6/7/2023  9:30 AM JUSTIN Silva - CNP AFL TSP AND AFL Tri Stat   11/14/2023  9:00 AM MD OWEN Cazares

## 2023-05-15 ENCOUNTER — OFFICE VISIT (OUTPATIENT)
Dept: ENT CLINIC | Age: 88
End: 2023-05-15
Payer: MEDICARE

## 2023-05-15 VITALS
WEIGHT: 180 LBS | HEIGHT: 66 IN | BODY MASS INDEX: 28.93 KG/M2 | DIASTOLIC BLOOD PRESSURE: 64 MMHG | TEMPERATURE: 97.5 F | HEART RATE: 69 BPM | SYSTOLIC BLOOD PRESSURE: 118 MMHG | OXYGEN SATURATION: 96 %

## 2023-05-15 DIAGNOSIS — H90.3 SENSORINEURAL HEARING LOSS (SNHL) OF BOTH EARS: Primary | ICD-10-CM

## 2023-05-15 DIAGNOSIS — H69.82 DYSFUNCTION OF LEFT EUSTACHIAN TUBE: ICD-10-CM

## 2023-05-15 PROCEDURE — 1123F ACP DISCUSS/DSCN MKR DOCD: CPT | Performed by: STUDENT IN AN ORGANIZED HEALTH CARE EDUCATION/TRAINING PROGRAM

## 2023-05-15 PROCEDURE — 1090F PRES/ABSN URINE INCON ASSESS: CPT | Performed by: STUDENT IN AN ORGANIZED HEALTH CARE EDUCATION/TRAINING PROGRAM

## 2023-05-15 PROCEDURE — G8427 DOCREV CUR MEDS BY ELIG CLIN: HCPCS | Performed by: STUDENT IN AN ORGANIZED HEALTH CARE EDUCATION/TRAINING PROGRAM

## 2023-05-15 PROCEDURE — G8417 CALC BMI ABV UP PARAM F/U: HCPCS | Performed by: STUDENT IN AN ORGANIZED HEALTH CARE EDUCATION/TRAINING PROGRAM

## 2023-05-15 PROCEDURE — 99213 OFFICE O/P EST LOW 20 MIN: CPT | Performed by: STUDENT IN AN ORGANIZED HEALTH CARE EDUCATION/TRAINING PROGRAM

## 2023-05-15 PROCEDURE — 1036F TOBACCO NON-USER: CPT | Performed by: STUDENT IN AN ORGANIZED HEALTH CARE EDUCATION/TRAINING PROGRAM

## 2023-05-15 ASSESSMENT — ENCOUNTER SYMPTOMS
RHINORRHEA: 0
NAUSEA: 0
SHORTNESS OF BREATH: 0
VOMITING: 0
COUGH: 0
EYE PAIN: 0

## 2023-05-27 DIAGNOSIS — I10 ESSENTIAL HYPERTENSION: ICD-10-CM

## 2023-05-28 RX ORDER — LISINOPRIL AND HYDROCHLOROTHIAZIDE 25; 20 MG/1; MG/1
TABLET ORAL
Qty: 90 TABLET | Refills: 1 | Status: SHIPPED | OUTPATIENT
Start: 2023-05-28

## 2023-07-17 ENCOUNTER — TELEPHONE (OUTPATIENT)
Dept: FAMILY MEDICINE CLINIC | Age: 88
End: 2023-07-17

## 2023-07-17 DIAGNOSIS — N32.81 OAB (OVERACTIVE BLADDER): Primary | ICD-10-CM

## 2023-07-17 NOTE — TELEPHONE ENCOUNTER
----- Message from Kobi Torres sent at 7/17/2023  8:30 AM EDT -----  Subject: Referral Request    Reason for referral request? Pt needs referral sent Urology in [place]. Pt   available for Appt. 07/24/2023 A senior service Bus can take Pt. after   07/18/2023 Also the 07/21/2023 TARA kimberlys transportation issues & will need   the address . Please give her a call. Provider patient wants to be referred to(if known):     Provider Phone Number(if known):     Additional Information for Provider?   ---------------------------------------------------------------------------  --------------  Madeleine Mattaponi Simone    [phone]; OK to leave message on voicemail  ---------------------------------------------------------------------------  --------------

## 2023-09-21 DIAGNOSIS — I10 ESSENTIAL HYPERTENSION: ICD-10-CM

## 2023-09-21 DIAGNOSIS — J06.9 VIRAL URI: ICD-10-CM

## 2023-09-21 DIAGNOSIS — E78.5 HYPERLIPIDEMIA, UNSPECIFIED HYPERLIPIDEMIA TYPE: ICD-10-CM

## 2023-09-21 NOTE — TELEPHONE ENCOUNTER
Refill Request     CONFIRM preferred pharmacy with the patient. If Mail Order Rx - Pend for 90 day refill. Last Seen: Last Seen Department: 4/28/2023  Last Seen by PCP: Visit date not found    Last Written: 05/28/23 90 with 1refill    If no future appointment scheduled:  Review the last OV with PCP and review information for follow-up visit,  Route STAFF MESSAGE with patient name to the [facility] for scheduling with the following information:            -  Timing of next visit           -  Visit type ie Physical, OV, etc           -  Diagnoses/Reason ie. COPD, HTN - Do not use MEDICATION, Follow-up or CHECK UP - Give reason for visit      Next Appointment:   Future Appointments   Date Time Provider [address]   11/14/2023  9:00 AM MD OWEN Zuleta  [place] - [place]   11/28/2023  9:30 AM JUSTIN William - CNP AFL TSP AND AFL Tri Stat       Message sent to  to schedule appt with patient?   NO      Requested Prescriptions     Pending Prescriptions Disp Refills    lisinopril-hydroCHLOROthiazide (PRINZIDE;ZESTORETIC) 20-25 MG per tablet [Pharmacy Med Name: Lisinopril-hydroCHLOROthiazide 20-25 MG Oral Tablet] 90 tablet 3     Sig: TAKE 1 TABLET BY MOUTH DAILY

## 2023-09-21 NOTE — TELEPHONE ENCOUNTER
Refill Request     CONFIRM preferred pharmacy with the patient. If Mail Order Rx - Pend for 90 day refill. Last Seen: Last Seen Department: 4/28/2023  Last Seen by PCP: 2/28/2023    Last Written: 2/23/2023, #18, 5 refill    If no future appointment scheduled:  Review the last OV with PCP and review information for follow-up visit,  Route STAFF MESSAGE with patient name to the [facility] for scheduling with the following information:            -  Timing of next visit           -  Visit type ie Physical, OV, etc           -  Diagnoses/Reason ie. COPD, HTN - Do not use MEDICATION, Follow-up or CHECK UP - Give reason for visit      Next Appointment:   Future Appointments   Date Time Provider [address] [place] [address]   11/14/2023  9:00 AM MD OWEN Souza  [place] - DYFRANCESCO   11/28/2023  9:30 AM JUSTIN Vargas - CNP [facility] AND [facility]       Message sent to  to schedule appt with patient?   N/A      Requested Prescriptions     Pending Prescriptions Disp Refills    albuterol sulfate HFA (PROVENTIL;VENTOLIN;PROAIR) 108 (90 Base) MCG/ACT inhaler [Pharmacy Med Name: ALBUTEROL HFA 90MCG/ACT (PA)] 34 g 3     Sig: INHALE 1 TO 2 INHALATIONS BY  MOUTH EVERY 6 HOURS AS NEEDED  FOR WHEEZING OR SHORTNESS OF  BREATH

## 2023-09-21 NOTE — TELEPHONE ENCOUNTER
Refill Request     CONFIRM preferred pharmacy with the patient. If Mail Order Rx - Pend for 90 day refill. Last Seen: Last Seen Department: 4/28/2023  Last Seen by PCP: 2/28/2023    Last Written: 12/12/22 180 3 refills                         12/12/22  90 with 3 refills                          01/05/23 48 g with 3 refills  If no future appointment scheduled:  Review the last OV with PCP and review information for follow-up visit,  Route STAFF MESSAGE with patient name to the [facility] for scheduling with the following information:            -  Timing of next visit           -  Visit type ie Physical, OV, etc           -  Diagnoses/Reason ie. COPD, HTN - Do not use MEDICATION, Follow-up or CHECK UP - Give reason for visit      Next Appointment:   Future Appointments   Date Time Provider [address] [place] [address]   11/14/2023  9:00 AM MD OWEN Chauhan  [place] - DYD   11/28/2023  9:30 AM JUSTIN Herrera Chi - CNP [facility] AND [facility]       Message sent to  to schedule appt with patient?   NO      Requested Prescriptions     Pending Prescriptions Disp Refills    fluticasone (FLONASE) 50 MCG/ACT nasal spray [Pharmacy Med Name: Fluticasone Propionate 50 MCG/ACT Nasal Suspension] 48 g 3     Sig: USE 2 SPRAYS NASALLY DAILY    simvastatin (ZOCOR) 40 MG tablet [Pharmacy Med Name: Simvastatin 40 MG Oral Tablet] 90 tablet 3     Sig: TAKE 1 TABLET NIGHTLY    metoprolol tartrate (LOPRESSOR) 25 MG tablet [Pharmacy Med Name: Metoprolol Tartrate 25 MG Oral Tablet] 180 tablet 3     Sig: TAKE 1 TABLET TWICE A DAY

## 2023-09-22 RX ORDER — ALBUTEROL SULFATE 90 UG/1
AEROSOL, METERED RESPIRATORY (INHALATION)
Qty: 34 G | Refills: 3 | Status: SHIPPED | OUTPATIENT
Start: 2023-09-22

## 2023-09-22 RX ORDER — LISINOPRIL AND HYDROCHLOROTHIAZIDE 25; 20 MG/1; MG/1
TABLET ORAL
Qty: 90 TABLET | Refills: 1 | Status: ON HOLD | OUTPATIENT
Start: 2023-09-22 | End: 2023-11-07 | Stop reason: HOSPADM

## 2023-09-22 RX ORDER — FLUTICASONE PROPIONATE 50 MCG
SPRAY, SUSPENSION (ML) NASAL
Qty: 48 G | Refills: 1 | Status: SHIPPED | OUTPATIENT
Start: 2023-09-22

## 2023-09-22 RX ORDER — SIMVASTATIN 40 MG
TABLET ORAL
Qty: 90 TABLET | Refills: 1 | Status: SHIPPED | OUTPATIENT
Start: 2023-09-22

## 2023-10-12 LAB
BUN / CREAT RATIO: 25 RATIO (ref 9–28)
BUN BLDV-MCNC: 20 MG/DL (ref 6–20)
CALCIUM SERPL-MCNC: 10.6 MG/DL (ref 8.6–10.3)
CHLORIDE BLD-SCNC: 95 MMOL/L (ref 97–107)
CO2: 24 MMOL/L (ref 21–31)
CREAT SERPL-MCNC: 0.8 MG/DL (ref 0.6–1)
GLUCOSE: 89 MG/DL (ref 74–106)
POTASSIUM SERPL-SCNC: 4.2 MMOL/L (ref 3.6–5)
SODIUM BLD-SCNC: 124 MMOL/L (ref 135–145)

## 2023-11-03 ENCOUNTER — HOSPITAL ENCOUNTER (INPATIENT)
Age: 88
LOS: 6 days | Discharge: HOME | DRG: 884 | End: 2023-11-09
Attending: EMERGENCY MEDICINE | Admitting: INTERNAL MEDICINE
Payer: MEDICARE

## 2023-11-03 ENCOUNTER — APPOINTMENT (OUTPATIENT)
Dept: CT IMAGING | Age: 88
DRG: 884 | End: 2023-11-03
Payer: MEDICARE

## 2023-11-03 DIAGNOSIS — M51.26 DISPLACEMENT OF LUMBAR INTERVERTEBRAL DISC WITHOUT MYELOPATHY: ICD-10-CM

## 2023-11-03 DIAGNOSIS — M48.061 SPINAL STENOSIS OF LUMBAR REGION, UNSPECIFIED WHETHER NEUROGENIC CLAUDICATION PRESENT: ICD-10-CM

## 2023-11-03 DIAGNOSIS — M47.816 LUMBAR FACET ARTHROPATHY: ICD-10-CM

## 2023-11-03 DIAGNOSIS — S39.012A LUMBOSACRAL STRAIN, INITIAL ENCOUNTER: Primary | ICD-10-CM

## 2023-11-03 DIAGNOSIS — R26.81 UNSTEADY GAIT: ICD-10-CM

## 2023-11-03 PROBLEM — R53.81 DEBILITY: Status: ACTIVE | Noted: 2023-11-03

## 2023-11-03 PROBLEM — E87.1 HYPONATREMIA: Status: ACTIVE | Noted: 2023-11-03

## 2023-11-03 PROBLEM — E83.52 HYPERCALCEMIA: Status: ACTIVE | Noted: 2023-11-03

## 2023-11-03 PROBLEM — E21.3 HYPERPARATHYROIDISM (HCC): Status: ACTIVE | Noted: 2023-11-03

## 2023-11-03 LAB
ALBUMIN SERPL-MCNC: 4.5 G/DL (ref 3.4–5)
ALBUMIN/GLOB SERPL: 1.5 {RATIO} (ref 1.1–2.2)
ALP SERPL-CCNC: 69 U/L (ref 40–129)
ALT SERPL-CCNC: 25 U/L (ref 10–40)
ANION GAP SERPL CALCULATED.3IONS-SCNC: 9 MMOL/L (ref 3–16)
AST SERPL-CCNC: 24 U/L (ref 15–37)
BASOPHILS # BLD: 0 K/UL (ref 0–0.2)
BASOPHILS NFR BLD: 0.6 %
BILIRUB SERPL-MCNC: 0.4 MG/DL (ref 0–1)
BILIRUB UR QL STRIP.AUTO: NEGATIVE
BUN SERPL-MCNC: 20 MG/DL (ref 7–20)
CALCIUM SERPL-MCNC: 10.8 MG/DL (ref 8.3–10.6)
CHLORIDE SERPL-SCNC: 96 MMOL/L (ref 99–110)
CLARITY UR: ABNORMAL
CO2 SERPL-SCNC: 27 MMOL/L (ref 21–32)
COLOR UR: YELLOW
CREAT SERPL-MCNC: 0.7 MG/DL (ref 0.6–1.2)
DEPRECATED RDW RBC AUTO: 13.7 % (ref 12.4–15.4)
EOSINOPHIL # BLD: 0.3 K/UL (ref 0–0.6)
EOSINOPHIL NFR BLD: 4 %
GFR SERPLBLD CREATININE-BSD FMLA CKD-EPI: >60 ML/MIN/{1.73_M2}
GLUCOSE SERPL-MCNC: 126 MG/DL (ref 70–99)
GLUCOSE UR STRIP.AUTO-MCNC: NEGATIVE MG/DL
HCT VFR BLD AUTO: 34.2 % (ref 36–48)
HGB BLD-MCNC: 11.5 G/DL (ref 12–16)
HGB UR QL STRIP.AUTO: NEGATIVE
KETONES UR STRIP.AUTO-MCNC: NEGATIVE MG/DL
LEUKOCYTE ESTERASE UR QL STRIP.AUTO: NEGATIVE
LIPASE SERPL-CCNC: 23 U/L (ref 13–60)
LYMPHOCYTES # BLD: 1.5 K/UL (ref 1–5.1)
LYMPHOCYTES NFR BLD: 23.2 %
MCH RBC QN AUTO: 30.3 PG (ref 26–34)
MCHC RBC AUTO-ENTMCNC: 33.6 G/DL (ref 31–36)
MCV RBC AUTO: 90.3 FL (ref 80–100)
MONOCYTES # BLD: 0.4 K/UL (ref 0–1.3)
MONOCYTES NFR BLD: 6.7 %
NEUTROPHILS # BLD: 4.2 K/UL (ref 1.7–7.7)
NEUTROPHILS NFR BLD: 65.5 %
NITRITE UR QL STRIP.AUTO: NEGATIVE
PH UR STRIP.AUTO: 6 [PH] (ref 5–8)
PLATELET # BLD AUTO: 221 K/UL (ref 135–450)
PMV BLD AUTO: 8.3 FL (ref 5–10.5)
POTASSIUM SERPL-SCNC: 3.9 MMOL/L (ref 3.5–5.1)
PROT SERPL-MCNC: 7.6 G/DL (ref 6.4–8.2)
PROT UR STRIP.AUTO-MCNC: NEGATIVE MG/DL
RBC # BLD AUTO: 3.78 M/UL (ref 4–5.2)
SODIUM SERPL-SCNC: 132 MMOL/L (ref 136–145)
SP GR UR STRIP.AUTO: 1.02 (ref 1–1.03)
UA COMPLETE W REFLEX CULTURE PNL UR: ABNORMAL
UA DIPSTICK W REFLEX MICRO PNL UR: ABNORMAL
URN SPEC COLLECT METH UR: ABNORMAL
UROBILINOGEN UR STRIP-ACNC: 0.2 E.U./DL
WBC # BLD AUTO: 6.3 K/UL (ref 4–11)

## 2023-11-03 PROCEDURE — 94761 N-INVAS EAR/PLS OXIMETRY MLT: CPT

## 2023-11-03 PROCEDURE — 6370000000 HC RX 637 (ALT 250 FOR IP): Performed by: EMERGENCY MEDICINE

## 2023-11-03 PROCEDURE — 80053 COMPREHEN METABOLIC PANEL: CPT

## 2023-11-03 PROCEDURE — 97116 GAIT TRAINING THERAPY: CPT

## 2023-11-03 PROCEDURE — 83690 ASSAY OF LIPASE: CPT

## 2023-11-03 PROCEDURE — 99222 1ST HOSP IP/OBS MODERATE 55: CPT

## 2023-11-03 PROCEDURE — 74176 CT ABD & PELVIS W/O CONTRAST: CPT

## 2023-11-03 PROCEDURE — 97161 PT EVAL LOW COMPLEX 20 MIN: CPT

## 2023-11-03 PROCEDURE — 2580000003 HC RX 258

## 2023-11-03 PROCEDURE — 85025 COMPLETE CBC W/AUTO DIFF WBC: CPT

## 2023-11-03 PROCEDURE — 99285 EMERGENCY DEPT VISIT HI MDM: CPT

## 2023-11-03 PROCEDURE — 96374 THER/PROPH/DIAG INJ IV PUSH: CPT

## 2023-11-03 PROCEDURE — 6360000002 HC RX W HCPCS: Performed by: EMERGENCY MEDICINE

## 2023-11-03 PROCEDURE — 6370000000 HC RX 637 (ALT 250 FOR IP)

## 2023-11-03 PROCEDURE — 36415 COLL VENOUS BLD VENIPUNCTURE: CPT

## 2023-11-03 PROCEDURE — 94640 AIRWAY INHALATION TREATMENT: CPT

## 2023-11-03 PROCEDURE — 6370000000 HC RX 637 (ALT 250 FOR IP): Performed by: INTERNAL MEDICINE

## 2023-11-03 PROCEDURE — 1200000000 HC SEMI PRIVATE

## 2023-11-03 PROCEDURE — 81003 URINALYSIS AUTO W/O SCOPE: CPT

## 2023-11-03 PROCEDURE — 97530 THERAPEUTIC ACTIVITIES: CPT

## 2023-11-03 RX ORDER — SODIUM CHLORIDE 9 MG/ML
INJECTION, SOLUTION INTRAVENOUS PRN
Status: DISCONTINUED | OUTPATIENT
Start: 2023-11-03 | End: 2023-11-09 | Stop reason: HOSPADM

## 2023-11-03 RX ORDER — POTASSIUM CHLORIDE 20 MEQ/1
40 TABLET, EXTENDED RELEASE ORAL PRN
Status: DISCONTINUED | OUTPATIENT
Start: 2023-11-03 | End: 2023-11-09 | Stop reason: HOSPADM

## 2023-11-03 RX ORDER — ACETAMINOPHEN 500 MG
1000 TABLET ORAL ONCE
Status: COMPLETED | OUTPATIENT
Start: 2023-11-03 | End: 2023-11-03

## 2023-11-03 RX ORDER — PREDNISONE 20 MG/1
20 TABLET ORAL DAILY
Status: COMPLETED | OUTPATIENT
Start: 2023-11-03 | End: 2023-11-07

## 2023-11-03 RX ORDER — DESMOPRESSIN ACETATE 0.1 MG/1
0.1 TABLET ORAL NIGHTLY
Status: ON HOLD | COMMUNITY
End: 2023-11-07 | Stop reason: HOSPADM

## 2023-11-03 RX ORDER — ONDANSETRON 4 MG/1
4 TABLET, ORALLY DISINTEGRATING ORAL EVERY 8 HOURS PRN
Status: DISCONTINUED | OUTPATIENT
Start: 2023-11-03 | End: 2023-11-09 | Stop reason: HOSPADM

## 2023-11-03 RX ORDER — OXYCODONE HYDROCHLORIDE AND ACETAMINOPHEN 5; 325 MG/1; MG/1
1 TABLET ORAL 3 TIMES DAILY
Status: DISCONTINUED | OUTPATIENT
Start: 2023-11-03 | End: 2023-11-09 | Stop reason: HOSPADM

## 2023-11-03 RX ORDER — METHOCARBAMOL 500 MG/1
500 TABLET, FILM COATED ORAL ONCE
Status: COMPLETED | OUTPATIENT
Start: 2023-11-03 | End: 2023-11-03

## 2023-11-03 RX ORDER — LISINOPRIL 20 MG/1
20 TABLET ORAL DAILY
Status: DISCONTINUED | OUTPATIENT
Start: 2023-11-03 | End: 2023-11-09 | Stop reason: HOSPADM

## 2023-11-03 RX ORDER — ACETAMINOPHEN 650 MG/1
650 SUPPOSITORY RECTAL EVERY 6 HOURS PRN
Status: DISCONTINUED | OUTPATIENT
Start: 2023-11-03 | End: 2023-11-09 | Stop reason: HOSPADM

## 2023-11-03 RX ORDER — BIOTIN 1 MG
1000 TABLET ORAL DAILY
COMMUNITY

## 2023-11-03 RX ORDER — SODIUM CHLORIDE 0.9 % (FLUSH) 0.9 %
5-40 SYRINGE (ML) INJECTION EVERY 12 HOURS SCHEDULED
Status: DISCONTINUED | OUTPATIENT
Start: 2023-11-03 | End: 2023-11-09 | Stop reason: HOSPADM

## 2023-11-03 RX ORDER — POLYETHYLENE GLYCOL 3350 17 G/17G
17 POWDER, FOR SOLUTION ORAL DAILY PRN
Status: DISCONTINUED | OUTPATIENT
Start: 2023-11-03 | End: 2023-11-09 | Stop reason: HOSPADM

## 2023-11-03 RX ORDER — ACETAMINOPHEN 325 MG/1
650 TABLET ORAL EVERY 6 HOURS PRN
Status: DISCONTINUED | OUTPATIENT
Start: 2023-11-03 | End: 2023-11-09 | Stop reason: HOSPADM

## 2023-11-03 RX ORDER — ALENDRONATE SODIUM 70 MG/1
TABLET ORAL
COMMUNITY
Start: 2023-09-13

## 2023-11-03 RX ORDER — TROSPIUM CHLORIDE 20 MG/1
20 TABLET, FILM COATED ORAL DAILY
Status: DISCONTINUED | OUTPATIENT
Start: 2023-11-03 | End: 2023-11-09 | Stop reason: HOSPADM

## 2023-11-03 RX ORDER — ATORVASTATIN CALCIUM 10 MG/1
20 TABLET, FILM COATED ORAL NIGHTLY
Status: DISCONTINUED | OUTPATIENT
Start: 2023-11-03 | End: 2023-11-09 | Stop reason: HOSPADM

## 2023-11-03 RX ORDER — ENOXAPARIN SODIUM 100 MG/ML
40 INJECTION SUBCUTANEOUS DAILY
Status: DISCONTINUED | OUTPATIENT
Start: 2023-11-03 | End: 2023-11-09 | Stop reason: HOSPADM

## 2023-11-03 RX ORDER — KETOROLAC TROMETHAMINE 15 MG/ML
15 INJECTION, SOLUTION INTRAMUSCULAR; INTRAVENOUS ONCE
Status: COMPLETED | OUTPATIENT
Start: 2023-11-03 | End: 2023-11-03

## 2023-11-03 RX ORDER — HYDROCHLOROTHIAZIDE 25 MG/1
25 TABLET ORAL ONCE
Status: COMPLETED | OUTPATIENT
Start: 2023-11-03 | End: 2023-11-03

## 2023-11-03 RX ORDER — ALBUTEROL SULFATE 90 UG/1
2 AEROSOL, METERED RESPIRATORY (INHALATION) NIGHTLY
Status: DISCONTINUED | OUTPATIENT
Start: 2023-11-03 | End: 2023-11-09 | Stop reason: HOSPADM

## 2023-11-03 RX ORDER — SODIUM CHLORIDE 0.9 % (FLUSH) 0.9 %
5-40 SYRINGE (ML) INJECTION PRN
Status: DISCONTINUED | OUTPATIENT
Start: 2023-11-03 | End: 2023-11-09 | Stop reason: HOSPADM

## 2023-11-03 RX ORDER — LIDOCAINE 4 G/G
1 PATCH TOPICAL ONCE
Status: COMPLETED | OUTPATIENT
Start: 2023-11-03 | End: 2023-11-03

## 2023-11-03 RX ORDER — ALBUTEROL SULFATE 90 UG/1
2 AEROSOL, METERED RESPIRATORY (INHALATION) EVERY 6 HOURS PRN
Status: DISCONTINUED | OUTPATIENT
Start: 2023-11-03 | End: 2023-11-09 | Stop reason: HOSPADM

## 2023-11-03 RX ORDER — SODIUM CHLORIDE 9 MG/ML
INJECTION, SOLUTION INTRAVENOUS CONTINUOUS
Status: DISCONTINUED | OUTPATIENT
Start: 2023-11-03 | End: 2023-11-04

## 2023-11-03 RX ORDER — ASPIRIN 81 MG/1
81 TABLET ORAL DAILY
Status: DISCONTINUED | OUTPATIENT
Start: 2023-11-04 | End: 2023-11-09 | Stop reason: HOSPADM

## 2023-11-03 RX ORDER — LISINOPRIL 20 MG/1
20 TABLET ORAL ONCE
Status: COMPLETED | OUTPATIENT
Start: 2023-11-03 | End: 2023-11-03

## 2023-11-03 RX ORDER — POTASSIUM CHLORIDE 7.45 MG/ML
10 INJECTION INTRAVENOUS PRN
Status: DISCONTINUED | OUTPATIENT
Start: 2023-11-03 | End: 2023-11-09 | Stop reason: HOSPADM

## 2023-11-03 RX ORDER — LIDOCAINE 4 G/G
1 PATCH TOPICAL DAILY
Status: DISCONTINUED | OUTPATIENT
Start: 2023-11-03 | End: 2023-11-09 | Stop reason: HOSPADM

## 2023-11-03 RX ORDER — ONDANSETRON 2 MG/ML
4 INJECTION INTRAMUSCULAR; INTRAVENOUS EVERY 6 HOURS PRN
Status: DISCONTINUED | OUTPATIENT
Start: 2023-11-03 | End: 2023-11-09 | Stop reason: HOSPADM

## 2023-11-03 RX ORDER — MAGNESIUM SULFATE IN WATER 40 MG/ML
2000 INJECTION, SOLUTION INTRAVENOUS PRN
Status: DISCONTINUED | OUTPATIENT
Start: 2023-11-03 | End: 2023-11-09 | Stop reason: HOSPADM

## 2023-11-03 RX ORDER — DESMOPRESSIN ACETATE 0.1 MG/1
100 TABLET ORAL NIGHTLY
Status: DISCONTINUED | OUTPATIENT
Start: 2023-11-03 | End: 2023-11-06

## 2023-11-03 RX ADMIN — OXYCODONE AND ACETAMINOPHEN 1 TABLET: 5; 325 TABLET ORAL at 21:58

## 2023-11-03 RX ADMIN — METHOCARBAMOL TABLETS 500 MG: 500 TABLET, COATED ORAL at 08:14

## 2023-11-03 RX ADMIN — Medication 2 PUFF: at 19:22

## 2023-11-03 RX ADMIN — TROSPIUM CHLORIDE 20 MG: 20 TABLET, FILM COATED ORAL at 15:08

## 2023-11-03 RX ADMIN — LISINOPRIL 20 MG: 20 TABLET ORAL at 11:22

## 2023-11-03 RX ADMIN — HYDROCHLOROTHIAZIDE 25 MG: 25 TABLET ORAL at 11:22

## 2023-11-03 RX ADMIN — DESMOPRESSIN ACETATE 100 MCG: 0.1 TABLET ORAL at 21:58

## 2023-11-03 RX ADMIN — ACETAMINOPHEN 1000 MG: 500 TABLET ORAL at 08:14

## 2023-11-03 RX ADMIN — OXYCODONE AND ACETAMINOPHEN 1 TABLET: 5; 325 TABLET ORAL at 15:08

## 2023-11-03 RX ADMIN — PREDNISONE 20 MG: 20 TABLET ORAL at 15:08

## 2023-11-03 RX ADMIN — KETOROLAC TROMETHAMINE 15 MG: 15 INJECTION, SOLUTION INTRAMUSCULAR; INTRAVENOUS at 08:16

## 2023-11-03 RX ADMIN — ATORVASTATIN CALCIUM 20 MG: 10 TABLET, FILM COATED ORAL at 21:58

## 2023-11-03 RX ADMIN — SODIUM CHLORIDE: 9 INJECTION, SOLUTION INTRAVENOUS at 15:07

## 2023-11-03 RX ADMIN — METOPROLOL TARTRATE 25 MG: 25 TABLET, FILM COATED ORAL at 11:22

## 2023-11-03 RX ADMIN — METOPROLOL TARTRATE 25 MG: 25 TABLET, FILM COATED ORAL at 21:58

## 2023-11-03 ASSESSMENT — PAIN DESCRIPTION - DESCRIPTORS
DESCRIPTORS: ACHING
DESCRIPTORS: SHOOTING

## 2023-11-03 ASSESSMENT — PAIN SCALES - GENERAL
PAINLEVEL_OUTOF10: 7
PAINLEVEL_OUTOF10: 5
PAINLEVEL_OUTOF10: 8
PAINLEVEL_OUTOF10: 3

## 2023-11-03 ASSESSMENT — PAIN DESCRIPTION - ORIENTATION: ORIENTATION: LEFT

## 2023-11-03 ASSESSMENT — PAIN DESCRIPTION - LOCATION
LOCATION: BACK
LOCATION: BACK

## 2023-11-03 ASSESSMENT — LIFESTYLE VARIABLES
HOW OFTEN DO YOU HAVE A DRINK CONTAINING ALCOHOL: NEVER
HOW MANY STANDARD DRINKS CONTAINING ALCOHOL DO YOU HAVE ON A TYPICAL DAY: PATIENT DOES NOT DRINK

## 2023-11-03 NOTE — ED NOTES
Report to [facility] RN on med surg. All questions and concerns covered. Pt in stable condition. Jayna transported patient up to [address] [place] [address], [address] [place] [address], Columba Lx, RN  11/03/23 0924

## 2023-11-03 NOTE — ED NOTES
Case Management reports due to this pt insurance she will need a pre-cert and need to be admitted to the hospital for placement.      Vannessa Roman RN  11/03/23 [id number]

## 2023-11-03 NOTE — ED NOTES
Talked to PT/OT they will be down to eval patient. Columba Bahena, RN       Virginie Bynum, RN  11/03/23 1076

## 2023-11-03 NOTE — H&P
with pedicle screws    CARPAL TUNNEL RELEASE      rt hand 1990    CERVICAL DISCECTOMY  2/15/12    C4-5 complete and radical discectomy and bilateral foraminotomy and take down of posterior longitudinal ligament    CHOLECYSTECTOMY      COLONOSCOPY      COLONOSCOPY N/A 1/20/2020    COLONOSCOPY POLYPECTOMY SNARE/COLD BIOPSY performed by Naun Vides MD at [address] N/A 01/04/2019    RIGID CYSTOSCOPY     CYSTOSCOPY N/A 1/4/2019    RIGID CYSTOSCOPY performed by Felix Smith MD at [address], COLON, DIAGNOSTIC      FOOT SURGERY      repair torn tendon with platelet infusion rt foot 2010    HYSTERECTOMY (CERVIX STATUS UNKNOWN)      partial 1976    JOINT REPLACEMENT      rt knee 2008    JOINT REPLACEMENT      lft knee 2014    LAMINECTOMY  9/11/12    C4-5-6 Laminectomy    LUMBAR LAMINECTOMY      1991    OTHER SURGICAL HISTORY  09/11/2012     C4-7 LAMINECTOMY WITH C4-5-6 FUSION WITH SPINAL CORD    OTHER SURGICAL HISTORY  2013    stimulator placed    OTHER SURGICAL HISTORY N/A 09/17/2014    THORACIC LAMINECTOMY FOR REMOVALOF SPINAL CORD STIMULATOR    PARTIAL HYSTERECTOMY (CERVIX NOT REMOVED)      SKIN BIOPSY      cancer above rt eye    SPINAL FUSION  2/15/12    C4-5 interbody fusion, anterior cervical plate, machined interbody spacer    SPINAL FUSION  9/11/12    C4-5-6 fusion, local bone graft, spinal cord monitoring    TONSILLECTOMY         Medications Prior to Admission:    Prior to Admission medications    Medication Sig Start Date End Date Taking?  Authorizing Provider   Biotin 1000 MCG TABS Take 1,000 mcg by mouth daily   Yes Reagan Whyte MD   desmopressin (DDAVP) 0.1 MG tablet Take 1 tablet by mouth nightly   Yes Reagan Whyte MD   alendronate (FOSAMAX) 70 MG tablet  9/13/23   Reagan Whyte MD   albuterol sulfate HFA (PROVENTIL;VENTOLIN;PROAIR) 108 (90 Base) MCG/ACT inhaler INHALE 1 TO 2 INHALATIONS BY  MOUTH EVERY 6 HOURS AS NEEDED  FOR WHEEZING OR

## 2023-11-03 NOTE — ED PROVIDER NOTES
Seen by me in a triage capacity orders entered in anticipation of a more thorough history and physical by my partner. Briefly, patient is an 80-year-old female who had rapid onset left flank pain tonight with no radiation. No trauma. No dysuria or hematuria. No prior episodes. Pain worse with movement. Left CVA tenderness on exam and no palpable abdominal masses.   Labs, urine, CT ordered     Lizzie Rangel MD  11/03/23 8248
deformities noted, neurovascularly intact extremities. Skin:     General: Skin is warm. Findings: No rash. Neurological:      Mental Status: She is alert and oriented to person, place, and time. Motor: No abnormal muscle tone. Coordination: Coordination normal.      Comments: +2 Patellar Reflexes Bilaterally, +2 Achilles Reflexes Bilaterally. Light touch in lower extremities/groin bilaterally is intact. No overlying rashes. LE strength is 5/5 including adduction, knee, ankle and big toe flexion/extension.   Straight leg test is not present on the bilateral.       Psychiatric:         Mood and Affect: Mood normal.         Behavior: Behavior normal.           MDM/ED Course    ED Medication Orders (From admission, onward)      Start Ordered     Status Ordering Provider    11/03/23 1045 11/03/23 1040  metoprolol tartrate (LOPRESSOR) tablet 25 mg  ONCE         Last MAR action: Given - by Tanner Mclaughlin on 11/03/23 at [address], [place]     11/03/23 1045 11/03/23 1040  lisinopril (PRINIVIL;ZESTRIL) tablet 20 mg  ONCE         Last MAR action: Given - by Tanner Mclaughlin on 11/03/23 at [address], [place]     11/03/23 1045 11/03/23 1040  hydroCHLOROthiazide (HYDRODIURIL) tablet 25 mg  ONCE         Last MAR action: Given - by Tanner Mclaughlin on 11/03/23 at [address], [place]     11/03/23 0815 11/03/23 0805  lidocaine 4 % external patch 1 patch  ONCE         Last MAR action: Patch Applied - by Tanner Mclaughlin on 11/03/23 at [address] [place]    11/03/23 0815 11/03/23 0805  methocarbamol (ROBAXIN) tablet 500 mg  ONCE         Last MAR action: Given - by Tanner Mclaughlin on 11/03/23 at 0814 [place]    11/03/23 0815 11/03/23 0805  acetaminophen (TYLENOL) tablet 1,000 mg  ONCE         Last MAR action: Given - by Tanner Mclaughlin on 11/03/23 at 0814 [place]    11/03/23 0815 11/03/23 0805  ketorolac (TORADOL) injection 15 mg  ONCE         Last MAR action: Given - by [place],

## 2023-11-04 LAB
ANION GAP SERPL CALCULATED.3IONS-SCNC: 9 MMOL/L (ref 3–16)
BASOPHILS # BLD: 0 K/UL (ref 0–0.2)
BASOPHILS NFR BLD: 0.5 %
BUN SERPL-MCNC: 24 MG/DL (ref 7–20)
CALCIUM SERPL-MCNC: 9.2 MG/DL (ref 8.3–10.6)
CHLORIDE SERPL-SCNC: 102 MMOL/L (ref 99–110)
CO2 SERPL-SCNC: 23 MMOL/L (ref 21–32)
CREAT SERPL-MCNC: <0.5 MG/DL (ref 0.6–1.2)
DEPRECATED RDW RBC AUTO: 13.7 % (ref 12.4–15.4)
EOSINOPHIL # BLD: 0 K/UL (ref 0–0.6)
EOSINOPHIL NFR BLD: 0.3 %
FOLATE SERPL-MCNC: >20 NG/ML (ref 4.78–24.2)
GFR SERPLBLD CREATININE-BSD FMLA CKD-EPI: >60 ML/MIN/{1.73_M2}
GLUCOSE SERPL-MCNC: 112 MG/DL (ref 70–99)
HCT VFR BLD AUTO: 30.6 % (ref 36–48)
HGB BLD-MCNC: 10.3 G/DL (ref 12–16)
IRON SATN MFR SERPL: 34 % (ref 15–50)
IRON SERPL-MCNC: 78 UG/DL (ref 37–145)
LYMPHOCYTES # BLD: 1.1 K/UL (ref 1–5.1)
LYMPHOCYTES NFR BLD: 11.4 %
MCH RBC QN AUTO: 30.3 PG (ref 26–34)
MCHC RBC AUTO-ENTMCNC: 33.7 G/DL (ref 31–36)
MCV RBC AUTO: 90 FL (ref 80–100)
MONOCYTES # BLD: 0.5 K/UL (ref 0–1.3)
MONOCYTES NFR BLD: 5.3 %
NEUTROPHILS # BLD: 7.9 K/UL (ref 1.7–7.7)
NEUTROPHILS NFR BLD: 82.5 %
PLATELET # BLD AUTO: 215 K/UL (ref 135–450)
PMV BLD AUTO: 8.5 FL (ref 5–10.5)
POTASSIUM SERPL-SCNC: 4.1 MMOL/L (ref 3.5–5.1)
RBC # BLD AUTO: 3.4 M/UL (ref 4–5.2)
SODIUM SERPL-SCNC: 134 MMOL/L (ref 136–145)
TIBC SERPL-MCNC: 231 UG/DL (ref 260–445)
VIT B12 SERPL-MCNC: 734 PG/ML (ref 211–911)
WBC # BLD AUTO: 9.5 K/UL (ref 4–11)

## 2023-11-04 PROCEDURE — 94640 AIRWAY INHALATION TREATMENT: CPT

## 2023-11-04 PROCEDURE — 2580000003 HC RX 258

## 2023-11-04 PROCEDURE — 99232 SBSQ HOSP IP/OBS MODERATE 35: CPT | Performed by: INTERNAL MEDICINE

## 2023-11-04 PROCEDURE — 82746 ASSAY OF FOLIC ACID SERUM: CPT

## 2023-11-04 PROCEDURE — 83540 ASSAY OF IRON: CPT

## 2023-11-04 PROCEDURE — 1200000000 HC SEMI PRIVATE

## 2023-11-04 PROCEDURE — 80048 BASIC METABOLIC PNL TOTAL CA: CPT

## 2023-11-04 PROCEDURE — 6370000000 HC RX 637 (ALT 250 FOR IP)

## 2023-11-04 PROCEDURE — 85025 COMPLETE CBC W/AUTO DIFF WBC: CPT

## 2023-11-04 PROCEDURE — 83550 IRON BINDING TEST: CPT

## 2023-11-04 PROCEDURE — 82607 VITAMIN B-12: CPT

## 2023-11-04 PROCEDURE — 94761 N-INVAS EAR/PLS OXIMETRY MLT: CPT

## 2023-11-04 PROCEDURE — 36415 COLL VENOUS BLD VENIPUNCTURE: CPT

## 2023-11-04 PROCEDURE — 6360000002 HC RX W HCPCS

## 2023-11-04 RX ADMIN — Medication 2 PUFF: at 19:15

## 2023-11-04 RX ADMIN — ASPIRIN 81 MG: 81 TABLET, COATED ORAL at 09:52

## 2023-11-04 RX ADMIN — Medication 10 ML: at 09:53

## 2023-11-04 RX ADMIN — OXYCODONE AND ACETAMINOPHEN 1 TABLET: 5; 325 TABLET ORAL at 09:52

## 2023-11-04 RX ADMIN — PREDNISONE 20 MG: 20 TABLET ORAL at 09:52

## 2023-11-04 RX ADMIN — LISINOPRIL 20 MG: 20 TABLET ORAL at 09:52

## 2023-11-04 RX ADMIN — OXYCODONE AND ACETAMINOPHEN 1 TABLET: 5; 325 TABLET ORAL at 13:42

## 2023-11-04 RX ADMIN — METOPROLOL TARTRATE 25 MG: 25 TABLET, FILM COATED ORAL at 09:52

## 2023-11-04 RX ADMIN — TROSPIUM CHLORIDE 20 MG: 20 TABLET, FILM COATED ORAL at 09:52

## 2023-11-04 RX ADMIN — ENOXAPARIN SODIUM 40 MG: 100 INJECTION SUBCUTANEOUS at 09:52

## 2023-11-04 ASSESSMENT — PAIN SCALES - GENERAL
PAINLEVEL_OUTOF10: 6
PAINLEVEL_OUTOF10: 7

## 2023-11-04 ASSESSMENT — PAIN DESCRIPTION - LOCATION
LOCATION: BACK
LOCATION: BACK

## 2023-11-04 NOTE — PLAN OF CARE
Problem: Discharge Planning  Goal: Discharge to home or other facility with appropriate resources  Outcome: Progressing     Problem: Pain  Goal: Verbalizes/displays adequate comfort level or baseline comfort level  11/3/2023 2225 by Mario Liang RN  Outcome: Progressing  11/3/2023 1832 by Kika Hanson RN  Outcome: Progressing

## 2023-11-05 LAB
ANION GAP SERPL CALCULATED.3IONS-SCNC: 9 MMOL/L (ref 3–16)
BASOPHILS # BLD: 0 K/UL (ref 0–0.2)
BASOPHILS NFR BLD: 0.3 %
BUN SERPL-MCNC: 22 MG/DL (ref 7–20)
CALCIUM SERPL-MCNC: 8.9 MG/DL (ref 8.3–10.6)
CHLORIDE SERPL-SCNC: 98 MMOL/L (ref 99–110)
CO2 SERPL-SCNC: 23 MMOL/L (ref 21–32)
CREAT SERPL-MCNC: 0.6 MG/DL (ref 0.6–1.2)
DEPRECATED RDW RBC AUTO: 13.6 % (ref 12.4–15.4)
EOSINOPHIL # BLD: 0.1 K/UL (ref 0–0.6)
EOSINOPHIL NFR BLD: 1.1 %
GFR SERPLBLD CREATININE-BSD FMLA CKD-EPI: >60 ML/MIN/{1.73_M2}
GLUCOSE SERPL-MCNC: 99 MG/DL (ref 70–99)
HCT VFR BLD AUTO: 29.8 % (ref 36–48)
HGB BLD-MCNC: 10.7 G/DL (ref 12–16)
LYMPHOCYTES # BLD: 1.6 K/UL (ref 1–5.1)
LYMPHOCYTES NFR BLD: 16.5 %
MCH RBC QN AUTO: 31 PG (ref 26–34)
MCHC RBC AUTO-ENTMCNC: 35.8 G/DL (ref 31–36)
MCV RBC AUTO: 86.5 FL (ref 80–100)
MONOCYTES # BLD: 0.5 K/UL (ref 0–1.3)
MONOCYTES NFR BLD: 5.6 %
NEUTROPHILS # BLD: 7.4 K/UL (ref 1.7–7.7)
NEUTROPHILS NFR BLD: 76.5 %
PLATELET # BLD AUTO: 204 K/UL (ref 135–450)
PMV BLD AUTO: 8.1 FL (ref 5–10.5)
POTASSIUM SERPL-SCNC: 4.3 MMOL/L (ref 3.5–5.1)
RBC # BLD AUTO: 3.44 M/UL (ref 4–5.2)
SODIUM SERPL-SCNC: 130 MMOL/L (ref 136–145)
WBC # BLD AUTO: 9.6 K/UL (ref 4–11)

## 2023-11-05 PROCEDURE — 36415 COLL VENOUS BLD VENIPUNCTURE: CPT

## 2023-11-05 PROCEDURE — 1200000000 HC SEMI PRIVATE

## 2023-11-05 PROCEDURE — 87449 NOS EACH ORGANISM AG IA: CPT

## 2023-11-05 PROCEDURE — 6370000000 HC RX 637 (ALT 250 FOR IP)

## 2023-11-05 PROCEDURE — 94640 AIRWAY INHALATION TREATMENT: CPT

## 2023-11-05 PROCEDURE — 6360000002 HC RX W HCPCS

## 2023-11-05 PROCEDURE — 85025 COMPLETE CBC W/AUTO DIFF WBC: CPT

## 2023-11-05 PROCEDURE — 99232 SBSQ HOSP IP/OBS MODERATE 35: CPT | Performed by: INTERNAL MEDICINE

## 2023-11-05 PROCEDURE — 80048 BASIC METABOLIC PNL TOTAL CA: CPT

## 2023-11-05 PROCEDURE — 2580000003 HC RX 258

## 2023-11-05 PROCEDURE — 97116 GAIT TRAINING THERAPY: CPT

## 2023-11-05 PROCEDURE — 97530 THERAPEUTIC ACTIVITIES: CPT

## 2023-11-05 PROCEDURE — 87506 IADNA-DNA/RNA PROBE TQ 6-11: CPT

## 2023-11-05 PROCEDURE — 87324 CLOSTRIDIUM AG IA: CPT

## 2023-11-05 PROCEDURE — 94761 N-INVAS EAR/PLS OXIMETRY MLT: CPT

## 2023-11-05 RX ADMIN — METOPROLOL TARTRATE 25 MG: 25 TABLET, FILM COATED ORAL at 00:03

## 2023-11-05 RX ADMIN — PREDNISONE 20 MG: 20 TABLET ORAL at 09:09

## 2023-11-05 RX ADMIN — Medication 2 PUFF: at 18:53

## 2023-11-05 RX ADMIN — OXYCODONE AND ACETAMINOPHEN 1 TABLET: 5; 325 TABLET ORAL at 20:31

## 2023-11-05 RX ADMIN — DESMOPRESSIN ACETATE 100 MCG: 0.1 TABLET ORAL at 20:30

## 2023-11-05 RX ADMIN — LISINOPRIL 20 MG: 20 TABLET ORAL at 09:09

## 2023-11-05 RX ADMIN — ATORVASTATIN CALCIUM 20 MG: 10 TABLET, FILM COATED ORAL at 00:04

## 2023-11-05 RX ADMIN — METOPROLOL TARTRATE 25 MG: 25 TABLET, FILM COATED ORAL at 20:30

## 2023-11-05 RX ADMIN — Medication 10 ML: at 20:31

## 2023-11-05 RX ADMIN — OXYCODONE AND ACETAMINOPHEN 1 TABLET: 5; 325 TABLET ORAL at 00:03

## 2023-11-05 RX ADMIN — METOPROLOL TARTRATE 25 MG: 25 TABLET, FILM COATED ORAL at 09:09

## 2023-11-05 RX ADMIN — ATORVASTATIN CALCIUM 20 MG: 10 TABLET, FILM COATED ORAL at 20:30

## 2023-11-05 RX ADMIN — Medication 10 ML: at 00:05

## 2023-11-05 RX ADMIN — TROSPIUM CHLORIDE 20 MG: 20 TABLET, FILM COATED ORAL at 09:09

## 2023-11-05 RX ADMIN — ENOXAPARIN SODIUM 40 MG: 100 INJECTION SUBCUTANEOUS at 09:09

## 2023-11-05 RX ADMIN — POLYETHYLENE GLYCOL (3350) 17 G: 17 POWDER, FOR SOLUTION ORAL at 09:09

## 2023-11-05 RX ADMIN — ASPIRIN 81 MG: 81 TABLET, COATED ORAL at 09:09

## 2023-11-05 RX ADMIN — Medication 10 ML: at 09:10

## 2023-11-05 RX ADMIN — DESMOPRESSIN ACETATE 100 MCG: 0.1 TABLET ORAL at 00:04

## 2023-11-05 RX ADMIN — OXYCODONE AND ACETAMINOPHEN 1 TABLET: 5; 325 TABLET ORAL at 09:09

## 2023-11-05 RX ADMIN — OXYCODONE AND ACETAMINOPHEN 1 TABLET: 5; 325 TABLET ORAL at 13:51

## 2023-11-05 ASSESSMENT — PAIN DESCRIPTION - DESCRIPTORS: DESCRIPTORS: ACHING;DISCOMFORT

## 2023-11-05 ASSESSMENT — PAIN DESCRIPTION - LOCATION
LOCATION: BACK

## 2023-11-05 ASSESSMENT — PAIN SCALES - GENERAL
PAINLEVEL_OUTOF10: 6

## 2023-11-05 NOTE — PLAN OF CARE
Problem: Pain  Goal: Verbalizes/displays adequate comfort level or baseline comfort level  Outcome: Progressing     Problem: Discharge Planning  Goal: Discharge to home or other facility with appropriate resources  Outcome: Progressing     Problem: Safety - Adult  Goal: Free from fall injury  Outcome: Progressing     Problem: Skin/Tissue Integrity  Goal: Absence of new skin breakdown  Description: 1. Monitor for areas of redness and/or skin breakdown  2. Assess vascular access sites hourly  3. Every 4-6 hours minimum:  Change oxygen saturation probe site  4. Every 4-6 hours:  If on nasal continuous positive airway pressure, respiratory therapy assess nares and determine need for appliance change or resting period.   Outcome: Progressing     Problem: ABCDS Injury Assessment  Goal: Absence of physical injury  Outcome: Progressing

## 2023-11-06 LAB
ANION GAP SERPL CALCULATED.3IONS-SCNC: 13 MMOL/L (ref 3–16)
ANION GAP SERPL CALCULATED.3IONS-SCNC: 9 MMOL/L (ref 3–16)
BASOPHILS # BLD: 0 K/UL (ref 0–0.2)
BASOPHILS NFR BLD: 0.3 %
BUN SERPL-MCNC: 19 MG/DL (ref 7–20)
BUN SERPL-MCNC: 34 MG/DL (ref 7–20)
C DIFF TOX A+B STL QL IA: NORMAL
CALCIUM SERPL-MCNC: 10.3 MG/DL (ref 8.3–10.6)
CALCIUM SERPL-MCNC: 9.1 MG/DL (ref 8.3–10.6)
CHLORIDE SERPL-SCNC: 93 MMOL/L (ref 99–110)
CHLORIDE SERPL-SCNC: 95 MMOL/L (ref 99–110)
CO2 SERPL-SCNC: 22 MMOL/L (ref 21–32)
CO2 SERPL-SCNC: 23 MMOL/L (ref 21–32)
CREAT SERPL-MCNC: 0.6 MG/DL (ref 0.6–1.2)
CREAT SERPL-MCNC: 0.6 MG/DL (ref 0.6–1.2)
DEPRECATED RDW RBC AUTO: 13.4 % (ref 12.4–15.4)
EOSINOPHIL # BLD: 0.1 K/UL (ref 0–0.6)
EOSINOPHIL NFR BLD: 1.4 %
GFR SERPLBLD CREATININE-BSD FMLA CKD-EPI: >60 ML/MIN/{1.73_M2}
GFR SERPLBLD CREATININE-BSD FMLA CKD-EPI: >60 ML/MIN/{1.73_M2}
GI PATHOGENS PNL STL NAA+PROBE: NORMAL
GLUCOSE SERPL-MCNC: 146 MG/DL (ref 70–99)
GLUCOSE SERPL-MCNC: 91 MG/DL (ref 70–99)
HCT VFR BLD AUTO: 31 % (ref 36–48)
HGB BLD-MCNC: 10.8 G/DL (ref 12–16)
LYMPHOCYTES # BLD: 1.5 K/UL (ref 1–5.1)
LYMPHOCYTES NFR BLD: 17 %
MCH RBC QN AUTO: 30.9 PG (ref 26–34)
MCHC RBC AUTO-ENTMCNC: 34.8 G/DL (ref 31–36)
MCV RBC AUTO: 88.8 FL (ref 80–100)
MONOCYTES # BLD: 0.6 K/UL (ref 0–1.3)
MONOCYTES NFR BLD: 6.8 %
NEUTROPHILS # BLD: 6.6 K/UL (ref 1.7–7.7)
NEUTROPHILS NFR BLD: 74.5 %
PLATELET # BLD AUTO: 217 K/UL (ref 135–450)
PMV BLD AUTO: 8.2 FL (ref 5–10.5)
POTASSIUM SERPL-SCNC: 4.1 MMOL/L (ref 3.5–5.1)
POTASSIUM SERPL-SCNC: 4.3 MMOL/L (ref 3.5–5.1)
RBC # BLD AUTO: 3.49 M/UL (ref 4–5.2)
SODIUM SERPL-SCNC: 126 MMOL/L (ref 136–145)
SODIUM SERPL-SCNC: 129 MMOL/L (ref 136–145)
WBC # BLD AUTO: 8.8 K/UL (ref 4–11)

## 2023-11-06 PROCEDURE — 1200000000 HC SEMI PRIVATE

## 2023-11-06 PROCEDURE — 2580000003 HC RX 258

## 2023-11-06 PROCEDURE — 94761 N-INVAS EAR/PLS OXIMETRY MLT: CPT

## 2023-11-06 PROCEDURE — 97166 OT EVAL MOD COMPLEX 45 MIN: CPT

## 2023-11-06 PROCEDURE — 6370000000 HC RX 637 (ALT 250 FOR IP): Performed by: INTERNAL MEDICINE

## 2023-11-06 PROCEDURE — 6370000000 HC RX 637 (ALT 250 FOR IP)

## 2023-11-06 PROCEDURE — 94640 AIRWAY INHALATION TREATMENT: CPT

## 2023-11-06 PROCEDURE — 97530 THERAPEUTIC ACTIVITIES: CPT

## 2023-11-06 PROCEDURE — 6360000002 HC RX W HCPCS

## 2023-11-06 PROCEDURE — 80048 BASIC METABOLIC PNL TOTAL CA: CPT

## 2023-11-06 PROCEDURE — 36415 COLL VENOUS BLD VENIPUNCTURE: CPT

## 2023-11-06 PROCEDURE — 97535 SELF CARE MNGMENT TRAINING: CPT

## 2023-11-06 PROCEDURE — 85025 COMPLETE CBC W/AUTO DIFF WBC: CPT

## 2023-11-06 RX ORDER — AMLODIPINE BESYLATE 5 MG/1
5 TABLET ORAL DAILY
Status: DISCONTINUED | OUTPATIENT
Start: 2023-11-06 | End: 2023-11-09 | Stop reason: HOSPADM

## 2023-11-06 RX ORDER — HYDRALAZINE HYDROCHLORIDE 20 MG/ML
5 INJECTION INTRAMUSCULAR; INTRAVENOUS EVERY 4 HOURS PRN
Status: DISCONTINUED | OUTPATIENT
Start: 2023-11-06 | End: 2023-11-09 | Stop reason: HOSPADM

## 2023-11-06 RX ADMIN — OXYCODONE AND ACETAMINOPHEN 1 TABLET: 5; 325 TABLET ORAL at 09:35

## 2023-11-06 RX ADMIN — ASPIRIN 81 MG: 81 TABLET, COATED ORAL at 09:35

## 2023-11-06 RX ADMIN — LISINOPRIL 20 MG: 20 TABLET ORAL at 09:35

## 2023-11-06 RX ADMIN — Medication 10 ML: at 09:40

## 2023-11-06 RX ADMIN — PREDNISONE 20 MG: 20 TABLET ORAL at 09:35

## 2023-11-06 RX ADMIN — TROSPIUM CHLORIDE 20 MG: 20 TABLET, FILM COATED ORAL at 09:35

## 2023-11-06 RX ADMIN — Medication 15 G: at 09:36

## 2023-11-06 RX ADMIN — Medication 2 PUFF: at 19:03

## 2023-11-06 RX ADMIN — POLYETHYLENE GLYCOL (3350) 17 G: 17 POWDER, FOR SOLUTION ORAL at 17:52

## 2023-11-06 RX ADMIN — OXYCODONE AND ACETAMINOPHEN 1 TABLET: 5; 325 TABLET ORAL at 14:45

## 2023-11-06 RX ADMIN — METOPROLOL TARTRATE 25 MG: 25 TABLET, FILM COATED ORAL at 21:03

## 2023-11-06 RX ADMIN — METOPROLOL TARTRATE 25 MG: 25 TABLET, FILM COATED ORAL at 09:35

## 2023-11-06 RX ADMIN — AMLODIPINE BESYLATE 5 MG: 5 TABLET ORAL at 12:26

## 2023-11-06 RX ADMIN — ATORVASTATIN CALCIUM 20 MG: 10 TABLET, FILM COATED ORAL at 21:03

## 2023-11-06 RX ADMIN — OXYCODONE AND ACETAMINOPHEN 1 TABLET: 5; 325 TABLET ORAL at 21:03

## 2023-11-06 RX ADMIN — Medication 10 ML: at 21:04

## 2023-11-06 RX ADMIN — ENOXAPARIN SODIUM 40 MG: 100 INJECTION SUBCUTANEOUS at 09:36

## 2023-11-06 ASSESSMENT — PAIN DESCRIPTION - LOCATION
LOCATION: BACK

## 2023-11-06 ASSESSMENT — PAIN SCALES - GENERAL
PAINLEVEL_OUTOF10: 6
PAINLEVEL_OUTOF10: 0

## 2023-11-06 ASSESSMENT — PAIN DESCRIPTION - FREQUENCY: FREQUENCY: CONTINUOUS

## 2023-11-06 ASSESSMENT — PAIN DESCRIPTION - DESCRIPTORS
DESCRIPTORS: ACHING;DISCOMFORT
DESCRIPTORS: ACHING
DESCRIPTORS: NAGGING;GNAWING;DISCOMFORT

## 2023-11-06 ASSESSMENT — PAIN DESCRIPTION - ORIENTATION
ORIENTATION: LEFT
ORIENTATION: MID

## 2023-11-06 ASSESSMENT — PAIN - FUNCTIONAL ASSESSMENT
PAIN_FUNCTIONAL_ASSESSMENT: PREVENTS OR INTERFERES SOME ACTIVE ACTIVITIES AND ADLS
PAIN_FUNCTIONAL_ASSESSMENT: ACTIVITIES ARE NOT PREVENTED

## 2023-11-06 ASSESSMENT — PAIN DESCRIPTION - ONSET: ONSET: ON-GOING

## 2023-11-06 ASSESSMENT — PAIN DESCRIPTION - PAIN TYPE: TYPE: CHRONIC PAIN

## 2023-11-06 NOTE — ACP (ADVANCE CARE PLANNING)
Advance Care Planning     General Advance Care Planning (ACP) Conversation    Date of Conversation: 11/3/2023  Conducted with: Patient with Decision Making Capacity    Healthcare Decision Maker:    Primary Decision Maker: Destin Fan - [phone]    Secondary Decision Maker: Ric Han - [phone]    Supplemental (Other) Decision Maker: Claribelelli Uribe Jayda) - [facility] - [phone]  Click here to complete Healthcare Decision Makers including selection of the Healthcare Decision Maker Relationship (ie \"Primary\"). Today we documented Decision Maker(s) consistent with Legal Next of Kin hierarchy.     Content/Action Overview:  DECLINED ACP Conversation - will revisit periodically  Reviewed DNR/DNI and patient elects Full Code (Attempt Resuscitation)        Length of Voluntary ACP Conversation in minutes:  <16 minutes (Non-Billable)    Zachary Vargas RN

## 2023-11-06 NOTE — PLAN OF CARE
Problem: Discharge Planning  Goal: Discharge to home or other facility with appropriate resources  Outcome: Progressing  Flowsheets (Taken 11/6/2023 0930 by Zohaib Pinto RN)  Discharge to home or other facility with appropriate resources: Identify barriers to discharge with patient and caregiver     Problem: Pain  Goal: Verbalizes/displays adequate comfort level or baseline comfort level  Outcome: Progressing     Problem: Safety - Adult  Goal: Free from fall injury  Outcome: Progressing     Problem: Respiratory - Adult  Goal: Achieves optimal ventilation and oxygenation  Flowsheets (Taken 11/6/2023 1239)  Achieves optimal ventilation and oxygenation: Position to facilitate oxygenation and minimize respiratory effort     Problem: ABCDS Injury Assessment  Goal: Absence of physical injury  Outcome: Progressing

## 2023-11-06 NOTE — CARE COORDINATION
Case Management Assessment  Initial Evaluation    Date/Time of Evaluation: 11/6/2023 4:34 PM  Assessment Completed by: Oswald Lazar RN    If patient is discharged prior to next notation, then this note serves as note for discharge by case management. Patient Name: Jose Vences                   YOB: 1934  Diagnosis: Debility [R53.81]  Unsteady gait [R26.81]  Lumbosacral strain, initial encounter [J49.216E]                   Date / Time: 11/3/2023  4:35 AM    Patient Admission Status: Inpatient   Readmission Risk (Low < 19, Mod (19-27), High > 27): Readmission Risk Score: 12.4    Current PCP: Mirtha Hubbard MD  PCP verified by CM? Yes Kevin Plummer)    Chart Reviewed: Yes      History Provided by: Patient  Patient Orientation: Alert and Oriented    Patient Cognition: Alert    Hospitalization in the last 30 days (Readmission):  No    If yes, Readmission Assessment in  Navigator will be completed. Advance Directives:      Code Status: Full Code   Patient's Primary Decision Maker is: Legal Next of Kin    Primary Decision Maker: Lavern Gilmore - Child - [phone]    Secondary Decision Maker: Leeann Samuel - Child - [phone]    Supplemental (Other) Decision Maker: Rodger García - Child - [phone]    Discharge Planning:    Patient lives with: Children Type of Home: House  Primary Care Giver: Self  Patient Support Systems include: Children, Family Members   Current Financial resources: Medicare [facility] Medicare)  Current community resources: None  Current services prior to admission: Durable Medical Equipment, Other (Comment) (Rolliator)            Current DME:              Type of Home Care services:  None    ADLS  Prior functional level: Assistance with the following:, Housework, Shopping  Current functional level: Assistance with the following:, Housework, Shopping    PT AM-PAC:   /24  OT AM-PAC: 15 /24    Family can provide assistance at DC:  Yes  Would you like Case Management

## 2023-11-07 PROCEDURE — 1200000000 HC SEMI PRIVATE

## 2023-11-07 PROCEDURE — 94640 AIRWAY INHALATION TREATMENT: CPT

## 2023-11-07 PROCEDURE — 6360000002 HC RX W HCPCS

## 2023-11-07 PROCEDURE — 2580000003 HC RX 258

## 2023-11-07 PROCEDURE — 94761 N-INVAS EAR/PLS OXIMETRY MLT: CPT

## 2023-11-07 PROCEDURE — 6370000000 HC RX 637 (ALT 250 FOR IP)

## 2023-11-07 PROCEDURE — 6370000000 HC RX 637 (ALT 250 FOR IP): Performed by: INTERNAL MEDICINE

## 2023-11-07 RX ORDER — LIDOCAINE 4 G/G
1 PATCH TOPICAL DAILY
Qty: 15 PATCH | Refills: 0
Start: 2023-11-07 | End: 2023-11-09 | Stop reason: SDUPTHER

## 2023-11-07 RX ORDER — HYDROCHLOROTHIAZIDE 25 MG/1
25 TABLET ORAL DAILY
Status: DISCONTINUED | OUTPATIENT
Start: 2023-11-07 | End: 2023-11-07

## 2023-11-07 RX ORDER — LISINOPRIL 20 MG/1
20 TABLET ORAL DAILY
Qty: 90 TABLET | Refills: 1
Start: 2023-11-07 | End: 2023-11-09 | Stop reason: SDUPTHER

## 2023-11-07 RX ORDER — OXYCODONE HYDROCHLORIDE AND ACETAMINOPHEN 5; 325 MG/1; MG/1
1 TABLET ORAL 3 TIMES DAILY
Qty: 9 TABLET | Refills: 0 | Status: SHIPPED | OUTPATIENT
Start: 2023-11-07 | End: 2023-11-10

## 2023-11-07 RX ORDER — AMLODIPINE BESYLATE 5 MG/1
5 TABLET ORAL NIGHTLY
Qty: 30 TABLET | Refills: 3
Start: 2023-11-07 | End: 2023-11-09 | Stop reason: SDUPTHER

## 2023-11-07 RX ADMIN — ASPIRIN 81 MG: 81 TABLET, COATED ORAL at 10:53

## 2023-11-07 RX ADMIN — OXYCODONE AND ACETAMINOPHEN 1 TABLET: 5; 325 TABLET ORAL at 21:18

## 2023-11-07 RX ADMIN — AMLODIPINE BESYLATE 5 MG: 5 TABLET ORAL at 10:53

## 2023-11-07 RX ADMIN — POLYETHYLENE GLYCOL (3350) 17 G: 17 POWDER, FOR SOLUTION ORAL at 10:53

## 2023-11-07 RX ADMIN — METOPROLOL TARTRATE 25 MG: 25 TABLET, FILM COATED ORAL at 10:53

## 2023-11-07 RX ADMIN — METOPROLOL TARTRATE 25 MG: 25 TABLET, FILM COATED ORAL at 21:18

## 2023-11-07 RX ADMIN — Medication 2 PUFF: at 20:48

## 2023-11-07 RX ADMIN — TROSPIUM CHLORIDE 20 MG: 20 TABLET, FILM COATED ORAL at 10:53

## 2023-11-07 RX ADMIN — PREDNISONE 20 MG: 20 TABLET ORAL at 10:53

## 2023-11-07 RX ADMIN — ATORVASTATIN CALCIUM 20 MG: 10 TABLET, FILM COATED ORAL at 21:18

## 2023-11-07 RX ADMIN — OXYCODONE AND ACETAMINOPHEN 1 TABLET: 5; 325 TABLET ORAL at 16:07

## 2023-11-07 RX ADMIN — ENOXAPARIN SODIUM 40 MG: 100 INJECTION SUBCUTANEOUS at 10:54

## 2023-11-07 RX ADMIN — LISINOPRIL 20 MG: 20 TABLET ORAL at 10:53

## 2023-11-07 RX ADMIN — OXYCODONE AND ACETAMINOPHEN 1 TABLET: 5; 325 TABLET ORAL at 10:53

## 2023-11-07 RX ADMIN — Medication 10 ML: at 21:19

## 2023-11-07 ASSESSMENT — PAIN DESCRIPTION - DESCRIPTORS
DESCRIPTORS: ACHING;DISCOMFORT
DESCRIPTORS: ACHING
DESCRIPTORS: ACHING
DESCRIPTORS: DISCOMFORT

## 2023-11-07 ASSESSMENT — PAIN SCALES - GENERAL
PAINLEVEL_OUTOF10: 6
PAINLEVEL_OUTOF10: 6
PAINLEVEL_OUTOF10: 4
PAINLEVEL_OUTOF10: 6

## 2023-11-07 ASSESSMENT — PAIN DESCRIPTION - LOCATION
LOCATION: BACK

## 2023-11-07 ASSESSMENT — PAIN - FUNCTIONAL ASSESSMENT: PAIN_FUNCTIONAL_ASSESSMENT: PREVENTS OR INTERFERES SOME ACTIVE ACTIVITIES AND ADLS

## 2023-11-07 ASSESSMENT — PAIN DESCRIPTION - ORIENTATION: ORIENTATION: MID

## 2023-11-07 NOTE — PLAN OF CARE
Problem: Discharge Planning  Goal: Discharge to home or other facility with appropriate resources  11/7/2023 1813 by Stormy James RN  Outcome: Progressing  Flowsheets  Taken 11/7/2023 1050 by Damien Yap RN  Discharge to home or other facility with appropriate resources: Identify barriers to discharge with patient and caregiver  Taken 11/7/2023 0830 by Stormy James RN  Discharge to home or other facility with appropriate resources: Identify discharge learning needs (meds, wound care, etc)  11/7/2023 0438 by Veronica Burgos RN  Outcome: Progressing     Problem: Pain  Goal: Verbalizes/displays adequate comfort level or baseline comfort level  11/7/2023 1813 by Stormy James RN  Outcome: Progressing  11/7/2023 0438 by Veronica Burgos RN  Outcome: Progressing     Problem: Safety - Adult  Goal: Free from fall injury  11/7/2023 1813 by Stormy James RN  Outcome: Progressing  11/7/2023 0438 by Veronica Burgos RN  Outcome: Progressing     Problem: Respiratory - Adult  Goal: Achieves optimal ventilation and oxygenation  11/7/2023 1813 by Stormy James RN  Outcome: Progressing  11/7/2023 0438 by Veronica Burgos RN  Outcome: Progressing     Problem: ABCDS Injury Assessment  Goal: Absence of physical injury  11/7/2023 1813 by Stormy James RN  Outcome: Progressing  11/7/2023 0438 by Veronica Burgos RN  Outcome: Progressing

## 2023-11-07 NOTE — PLAN OF CARE
Problem: Discharge Planning  Goal: Discharge to home or other facility with appropriate resources  Outcome: Progressing     Problem: Pain  Goal: Verbalizes/displays adequate comfort level or baseline comfort level  Outcome: Progressing     Problem: Safety - Adult  Goal: Free from fall injury  Outcome: Progressing     Problem: Skin/Tissue Integrity  Goal: Absence of new skin breakdown  Description: 1. Monitor for areas of redness and/or skin breakdown  2. Assess vascular access sites hourly  3. Every 4-6 hours minimum:  Change oxygen saturation probe site  4. Every 4-6 hours:  If on nasal continuous positive airway pressure, respiratory therapy assess nares and determine need for appliance change or resting period.   Outcome: Progressing     Problem: ABCDS Injury Assessment  Goal: Absence of physical injury  Outcome: Progressing     Problem: Respiratory - Adult  Goal: Achieves optimal ventilation and oxygenation  Outcome: Progressing

## 2023-11-07 NOTE — CARE COORDINATION
Spoke with Emily Mortensen at [facility] [place] who states can accept pt and has started pre cert. Will cont to follow.

## 2023-11-07 NOTE — DISCHARGE INSTR - COC
days.     Update Admission H&P: No change in H&P    PHYSICIAN SIGNATURE:  Electronically signed by Varun Smith MD on 11/7/23 at 10:28 AM EST

## 2023-11-08 PROCEDURE — 2580000003 HC RX 258

## 2023-11-08 PROCEDURE — 97116 GAIT TRAINING THERAPY: CPT

## 2023-11-08 PROCEDURE — 1200000000 HC SEMI PRIVATE

## 2023-11-08 PROCEDURE — 6370000000 HC RX 637 (ALT 250 FOR IP): Performed by: INTERNAL MEDICINE

## 2023-11-08 PROCEDURE — 6360000002 HC RX W HCPCS

## 2023-11-08 PROCEDURE — 97535 SELF CARE MNGMENT TRAINING: CPT

## 2023-11-08 PROCEDURE — 97530 THERAPEUTIC ACTIVITIES: CPT

## 2023-11-08 PROCEDURE — 6370000000 HC RX 637 (ALT 250 FOR IP)

## 2023-11-08 RX ORDER — DOCUSATE SODIUM 100 MG/1
100 CAPSULE, LIQUID FILLED ORAL DAILY
Status: DISCONTINUED | OUTPATIENT
Start: 2023-11-08 | End: 2023-11-09 | Stop reason: HOSPADM

## 2023-11-08 RX ORDER — PSEUDOEPHEDRINE HCL 30 MG
100 TABLET ORAL DAILY
Qty: 30 CAPSULE | Refills: 0
Start: 2023-11-09 | End: 2023-11-09 | Stop reason: SDUPTHER

## 2023-11-08 RX ADMIN — DOCUSATE SODIUM 100 MG: 100 CAPSULE, LIQUID FILLED ORAL at 12:28

## 2023-11-08 RX ADMIN — METOPROLOL TARTRATE 25 MG: 25 TABLET, FILM COATED ORAL at 21:49

## 2023-11-08 RX ADMIN — ASPIRIN 81 MG: 81 TABLET, COATED ORAL at 09:16

## 2023-11-08 RX ADMIN — ATORVASTATIN CALCIUM 20 MG: 10 TABLET, FILM COATED ORAL at 21:06

## 2023-11-08 RX ADMIN — OXYCODONE AND ACETAMINOPHEN 1 TABLET: 5; 325 TABLET ORAL at 14:59

## 2023-11-08 RX ADMIN — ENOXAPARIN SODIUM 40 MG: 100 INJECTION SUBCUTANEOUS at 09:16

## 2023-11-08 RX ADMIN — TROSPIUM CHLORIDE 20 MG: 20 TABLET, FILM COATED ORAL at 09:15

## 2023-11-08 RX ADMIN — LISINOPRIL 20 MG: 20 TABLET ORAL at 09:16

## 2023-11-08 RX ADMIN — OXYCODONE AND ACETAMINOPHEN 1 TABLET: 5; 325 TABLET ORAL at 21:08

## 2023-11-08 RX ADMIN — Medication 10 ML: at 21:07

## 2023-11-08 RX ADMIN — AMLODIPINE BESYLATE 5 MG: 5 TABLET ORAL at 09:16

## 2023-11-08 RX ADMIN — METOPROLOL TARTRATE 25 MG: 25 TABLET, FILM COATED ORAL at 09:16

## 2023-11-08 RX ADMIN — Medication 10 ML: at 09:20

## 2023-11-08 RX ADMIN — OXYCODONE AND ACETAMINOPHEN 1 TABLET: 5; 325 TABLET ORAL at 09:16

## 2023-11-08 ASSESSMENT — PAIN SCALES - GENERAL: PAINLEVEL_OUTOF10: 6

## 2023-11-08 NOTE — PLAN OF CARE
Problem: Discharge Planning  Goal: Discharge to home or other facility with appropriate resources  11/7/2023 2323 by Niya Sam RN  Outcome: Progressing  11/7/2023 1813 by Roxane Guzmán RN  Outcome: Progressing  Flowsheets  Taken 11/7/2023 1050 by Malvin Ramirez RN  Discharge to home or other facility with appropriate resources: Identify barriers to discharge with patient and caregiver  Taken 11/7/2023 0830 by Roxane Guzmán RN  Discharge to home or other facility with appropriate resources: Identify discharge learning needs (meds, wound care, etc)     Problem: Pain  Goal: Verbalizes/displays adequate comfort level or baseline comfort level  11/7/2023 2323 by Niya Sam RN  Outcome: Progressing  11/7/2023 1813 by Roxane Guzmán RN  Outcome: Progressing     Problem: Safety - Adult  Goal: Free from fall injury  11/7/2023 2323 by Niya Sam RN  Outcome: Progressing  11/7/2023 1813 by Roxane Guzmán RN  Outcome: Progressing     Problem: Skin/Tissue Integrity  Goal: Absence of new skin breakdown  Description: 1. Monitor for areas of redness and/or skin breakdown  2. Assess vascular access sites hourly  3. Every 4-6 hours minimum:  Change oxygen saturation probe site  4. Every 4-6 hours:  If on nasal continuous positive airway pressure, respiratory therapy assess nares and determine need for appliance change or resting period.   Outcome: Progressing     Problem: ABCDS Injury Assessment  Goal: Absence of physical injury  11/7/2023 2323 by Niya Sam RN  Outcome: Progressing  11/7/2023 1813 by Roxane Guzmán RN  Outcome: Progressing     Problem: Respiratory - Adult  Goal: Achieves optimal ventilation and oxygenation  11/7/2023 2323 by Niya Sam RN  Outcome: Progressing  11/7/2023 1813 by Roxane Guzmán RN  Outcome: Progressing

## 2023-11-08 NOTE — CARE COORDINATION
INTERDISCIPLINARY PLAN OF CARE CONFERENCE    Date/Time: 11/8/2023 3:25 PM  Completed by: Ethel Galeana RN, Case Management      Patient Name:  Pita Bangura  YOB: 1934  Admitting Diagnosis: Debility [R53.81]  Unsteady gait [R26.81]  Lumbosacral strain, initial encounter [B71.006Q]     Admit Date/Time:  11/3/2023  4:35 AM    Chart reviewed. Interdisciplinary team contacted or reviewed plan related to patient progress and discharge plans. Disciplines included Case Management, Nursing, and Dietitian. Current Status:Stable  PT/OT recommendation for discharge plan of care: SNF    Expected D/C Disposition:  Rehab  Confirmed plan with patient and/or family Yes confirmed with: (name) niles  Met with:deshawn  Discharge Plan Comments: Reviewed chart and pt cont plan to go to [facility] [place] [facility] [place]. Spoke with Pilar Spann at [facility] [place] who states pre cert not completed yet and likely will go to [facility]. Updated pt and niles. Will follow up with [facility] in AM re P2P. Will follow.     Home O2 in place on admit: No  Pt informed of need to bring portable home O2 tank on day of discharge for nursing to connect prior to leaving:  Not Indicated  Verbalized agreement/Understanding:  Not Indicated

## 2023-11-08 NOTE — PLAN OF CARE
Problem: Discharge Planning  Goal: Discharge to home or other facility with appropriate resources  Outcome: Progressing  Flowsheets (Taken 11/8/2023 1041)  Discharge to home or other facility with appropriate resources: Identify barriers to discharge with patient and caregiver     Problem: Pain  Goal: Verbalizes/displays adequate comfort level or baseline comfort level  Outcome: Progressing     Problem: Safety - Adult  Goal: Free from fall injury  Outcome: Progressing     Problem: Skin/Tissue Integrity  Goal: Absence of new skin breakdown  Description: 1. Monitor for areas of redness and/or skin breakdown  2. Assess vascular access sites hourly  3. Every 4-6 hours minimum:  Change oxygen saturation probe site  4. Every 4-6 hours:  If on nasal continuous positive airway pressure, respiratory therapy assess nares and determine need for appliance change or resting period.   Outcome: Progressing     Problem: ABCDS Injury Assessment  Goal: Absence of physical injury  Outcome: Progressing     Problem: Respiratory - Adult  Goal: Achieves optimal ventilation and oxygenation  Outcome: Progressing

## 2023-11-09 VITALS
HEIGHT: 66 IN | RESPIRATION RATE: 16 BRPM | WEIGHT: 179 LBS | TEMPERATURE: 97.5 F | OXYGEN SATURATION: 93 % | HEART RATE: 64 BPM | SYSTOLIC BLOOD PRESSURE: 115 MMHG | BODY MASS INDEX: 28.77 KG/M2 | DIASTOLIC BLOOD PRESSURE: 61 MMHG

## 2023-11-09 PROCEDURE — 2580000003 HC RX 258

## 2023-11-09 PROCEDURE — 6370000000 HC RX 637 (ALT 250 FOR IP)

## 2023-11-09 PROCEDURE — 6360000002 HC RX W HCPCS

## 2023-11-09 PROCEDURE — 6370000000 HC RX 637 (ALT 250 FOR IP): Performed by: INTERNAL MEDICINE

## 2023-11-09 RX ORDER — BISACODYL 10 MG
10 SUPPOSITORY, RECTAL RECTAL DAILY PRN
Status: DISCONTINUED | OUTPATIENT
Start: 2023-11-09 | End: 2023-11-09 | Stop reason: HOSPADM

## 2023-11-09 RX ORDER — AMLODIPINE BESYLATE 5 MG/1
5 TABLET ORAL NIGHTLY
Qty: 30 TABLET | Refills: 0 | Status: SHIPPED | OUTPATIENT
Start: 2023-11-09

## 2023-11-09 RX ORDER — LIDOCAINE 4 G/G
1 PATCH TOPICAL DAILY
Qty: 15 PATCH | Refills: 0 | Status: SHIPPED | OUTPATIENT
Start: 2023-11-09

## 2023-11-09 RX ORDER — BISACODYL 10 MG
10 SUPPOSITORY, RECTAL RECTAL ONCE
Status: COMPLETED | OUTPATIENT
Start: 2023-11-09 | End: 2023-11-09

## 2023-11-09 RX ORDER — LISINOPRIL 20 MG/1
20 TABLET ORAL DAILY
Qty: 90 TABLET | Refills: 0 | Status: SHIPPED | OUTPATIENT
Start: 2023-11-09

## 2023-11-09 RX ORDER — PSEUDOEPHEDRINE HCL 30 MG
100 TABLET ORAL DAILY
Qty: 30 CAPSULE | Refills: 0 | Status: SHIPPED | OUTPATIENT
Start: 2023-11-09

## 2023-11-09 RX ADMIN — METOPROLOL TARTRATE 25 MG: 25 TABLET, FILM COATED ORAL at 07:42

## 2023-11-09 RX ADMIN — ENOXAPARIN SODIUM 40 MG: 100 INJECTION SUBCUTANEOUS at 07:42

## 2023-11-09 RX ADMIN — TROSPIUM CHLORIDE 20 MG: 20 TABLET, FILM COATED ORAL at 07:42

## 2023-11-09 RX ADMIN — BISACODYL 10 MG: 10 SUPPOSITORY RECTAL at 13:15

## 2023-11-09 RX ADMIN — OXYCODONE AND ACETAMINOPHEN 1 TABLET: 5; 325 TABLET ORAL at 07:42

## 2023-11-09 RX ADMIN — ASPIRIN 81 MG: 81 TABLET, COATED ORAL at 07:42

## 2023-11-09 RX ADMIN — DOCUSATE SODIUM 100 MG: 100 CAPSULE, LIQUID FILLED ORAL at 07:43

## 2023-11-09 RX ADMIN — Medication 10 ML: at 07:43

## 2023-11-09 RX ADMIN — OXYCODONE AND ACETAMINOPHEN 1 TABLET: 5; 325 TABLET ORAL at 13:16

## 2023-11-09 RX ADMIN — AMLODIPINE BESYLATE 5 MG: 5 TABLET ORAL at 07:42

## 2023-11-09 RX ADMIN — LISINOPRIL 20 MG: 20 TABLET ORAL at 07:43

## 2023-11-09 RX ADMIN — BISACODYL 10 MG: 10 SUPPOSITORY RECTAL at 11:21

## 2023-11-09 NOTE — PLAN OF CARE
Problem: Discharge Planning  Goal: Discharge to home or other facility with appropriate resources  11/9/2023 0058 by Evelyne Bernheim, RN  Outcome: Progressing  Flowsheets (Taken 11/8/2023 2058)  Discharge to home or other facility with appropriate resources: Identify barriers to discharge with patient and caregiver     Problem: Pain  Goal: Verbalizes/displays adequate comfort level or baseline comfort level  11/9/2023 1148 by Tonia Grant RN  Outcome: Progressing  11/9/2023 0058 by Evelyne Bernheim, RN  Outcome: Progressing     Problem: Safety - Adult  Goal: Free from fall injury  11/9/2023 1148 by Tonia Grant RN  Outcome: Progressing  11/9/2023 0058 by Evelyne Bernheim, RN  Outcome: Progressing     Problem: Skin/Tissue Integrity  Goal: Absence of new skin breakdown  Description: 1. Monitor for areas of redness and/or skin breakdown  2. Assess vascular access sites hourly  3. Every 4-6 hours minimum:  Change oxygen saturation probe site  4. Every 4-6 hours:  If on nasal continuous positive airway pressure, respiratory therapy assess nares and determine need for appliance change or resting period.   11/9/2023 1148 by Tonia Grant RN  Outcome: Progressing  11/9/2023 0058 by Evelyne Bernheim, RN  Outcome: Progressing     Problem: ABCDS Injury Assessment  Goal: Absence of physical injury  11/9/2023 0058 by Evelyne Bernheim, RN  Outcome: Progressing     Problem: Respiratory - Adult  Goal: Achieves optimal ventilation and oxygenation  11/9/2023 0058 by Evelyne Bernheim, RN  Outcome: Progressing

## 2023-11-09 NOTE — PLAN OF CARE
Problem: Discharge Planning  Goal: Discharge to home or other facility with appropriate resources  11/9/2023 1253 by Georgianne Nyhan, RN  Outcome: Completed  11/9/2023 0058 by Shagufta Tyler RN  Outcome: Progressing  Flowsheets (Taken 11/8/2023 2058)  Discharge to home or other facility with appropriate resources: Identify barriers to discharge with patient and caregiver     Problem: Pain  Goal: Verbalizes/displays adequate comfort level or baseline comfort level  11/9/2023 1253 by Georgianne Nyhan, RN  Outcome: Completed  11/9/2023 1148 by Georgianne Nyhan, RN  Outcome: Progressing  11/9/2023 0058 by Shagufta Tyler RN  Outcome: Progressing     Problem: Safety - Adult  Goal: Free from fall injury  11/9/2023 1253 by Georgianne Nyhan, RN  Outcome: Completed  11/9/2023 1148 by Georgianne Nyhan, RN  Outcome: Progressing  11/9/2023 0058 by Shagufta Tyler RN  Outcome: Progressing     Problem: Skin/Tissue Integrity  Goal: Absence of new skin breakdown  Description: 1. Monitor for areas of redness and/or skin breakdown  2. Assess vascular access sites hourly  3. Every 4-6 hours minimum:  Change oxygen saturation probe site  4. Every 4-6 hours:  If on nasal continuous positive airway pressure, respiratory therapy assess nares and determine need for appliance change or resting period.   11/9/2023 1253 by Georgianne Nyhan, RN  Outcome: Completed  11/9/2023 1148 by Georgianne Nyhan, RN  Outcome: Progressing  11/9/2023 0058 by Shagufta Tyler RN  Outcome: Progressing     Problem: ABCDS Injury Assessment  Goal: Absence of physical injury  11/9/2023 1253 by Georgianne Nyhan, RN  Outcome: Completed  11/9/2023 0058 by Shagufta Tyler RN  Outcome: Progressing     Problem: Respiratory - Adult  Goal: Achieves optimal ventilation and oxygenation  11/9/2023 1253 by Georgianne Nyhan, RN  Outcome: Completed  11/9/2023 0058 by Shagufta Tyler RN  Outcome: Progressing

## 2023-11-09 NOTE — DISCHARGE SUMMARY
Name:  Juan Peraza  Room:  0209/0209-02  MRN:    [id number]    IM Discharge Summary    Discharging Physician:  Gonzalo Khan MD    Admit: 11/3/2023    Discharge:      PCP      Nemo Mccann MD    Diagnoses and hospital course  this Admission      #Lumbosacral strain  #Hx lumbar stenosis with neurogenic claudication   #s/p lumbar laminectomy   # Age related physical Debility  - imaging  negative for acute abnormality   - has chronic back pain and  follows with pain management  - PDMP reviewed, continue percocet   - lidocaine patch and short course of prednisone   -Back pain is better   - lives home alone and now with unsteady gait   - PT/OT -> recommending SNF, patient agreeable   - case management to follow for placement needs         #Hyponatremia  Likely with recent initiation of DDVAP - desmopressin by her urology for nocturia   Dc this med  Hold hctz   Improved now at 129     #Diarrhea  - patient reports diarrhea for ~ 1 week, has been eating  salad dressing per daughter   - no recent antibiotic use or hospitalizations  - check stool studies   No diarrhea since admission , today asking for miralax     #HTN  - continue lisinopril, lopressor  - dc hctz and added norvasac  - monitor BP     #HLD  - continue statin      #OAB  - continue home regimen      #Hypercalcemia  #Hyperparathyroid  - follows with [facility] endocrinology      #Normocytic anemia  - hgb at basline  - no reports of bleeding   - monitor CBC, trend and transfuse to maintain Hgb > 7 or if symptomatic        BMP in 1 week       HPI 80 y.o. female with pmhx as below who presents to [facility] with lower back pain that started yesterday. She has a history of chronic back pain but yesterday as she was walking she developed a sharp, shooting pain in her lower back that was mostly on her left side. The pain does not radiate, no leg numbness, weakness, saddle paresthesias, urinary or stool incontinence. She denies any trauma to the area or falls.
Name:  Maria T Zazueta  Room:  0209/0209-02  MRN:    [id number]    IM Discharge Summary    Discharging Physician:  Luann Figueroa MD    Admit: 11/3/2023    Discharge:  23    PCP      Gregory Rooney MD    Diagnoses and hospital course  this Admission      #Lumbosacral strain  #Hx lumbar stenosis with neurogenic claudication   #s/p lumbar laminectomy   # Age related physical Debility  - imaging  negative for acute abnormality   - has chronic back pain and  follows with pain management  - PDMP reviewed, continue percocet   - lidocaine patch and short course of prednisone   -Back pain is better   - lives home alone and now with unsteady gait   - PT/OT -> recommending SNF, patient agreeable   - case management to follow for placement needs         #Hyponatremia  Likely with recent initiation of DDVAP - desmopressin by her urology for nocturia   Dc this med  Hold hctz   Improved now at 129     #Diarrhea  - patient reports diarrhea for ~ 1 week, has been eating  salad dressing per daughter   - no recent antibiotic use or hospitalizations  - check stool studies   No diarrhea since admission , today asking for miralax     #HTN  - continue lisinopril, lopressor  - dc hctz and added norvasac  - monitor BP     #HLD  - continue statin      #OAB  - continue home regimen      #Hypercalcemia  #Hyperparathyroid  - follows with [address] [place] [address] endocrinology      #Normocytic anemia  - hgb at basline  - no reports of bleeding   - monitor CBC, trend and transfuse to maintain Hgb > 7 or if symptomatic        BMP in 1 week       HPI 80 y.o. female with pmhx as below who presents to [address] [place] [address] with lower back pain that started yesterday. She has a history of chronic back pain but yesterday as she was walking she developed a sharp, shooting pain in her lower back that was mostly on her left side. The pain does not radiate, no leg numbness, weakness, saddle paresthesias, urinary or stool incontinence.  She denies any trauma to the area or
vibegron     Handicap Placard Misc  by Does not apply route     metoprolol tartrate 25 MG tablet  Commonly known as: LOPRESSOR  TAKE 1 TABLET TWICE A DAY     MIRALAX PO     MULTI FOR HER PO     simvastatin 40 MG tablet  Commonly known as: ZOCOR  TAKE 1 TABLET NIGHTLY            STOP taking these medications      desmopressin 0.1 MG tablet  Commonly known as: DDAVP     lisinopril-hydroCHLOROthiazide 20-25 MG per tablet  Commonly known as: PRINZIDE;ZESTORETIC               Where to Get Your Medications        You can get these medications from any pharmacy    Bring a paper prescription for each of these medications  oxyCODONE-acetaminophen 5-325 MG per tablet       Information about where to get these medications is not yet available    Ask your nurse or doctor about these medications  amLODIPine 5 MG tablet  docusate 100 MG Caps  lidocaine 4 % external patch  lisinopril 20 MG tablet           Discharge Condition/Location: stable/home     Follow Up:    PCP in 1 weeks  BMP In 1 week to check sodium levels    Time Spent on discharge is more than 30 minutes in the examination, evaluation, counseling and review of medications and discharge plan.       Dutch Kirkpatrick MD, 11/9/2023 7:48 AM

## 2023-11-09 NOTE — PLAN OF CARE
Problem: Discharge Planning  Goal: Discharge to home or other facility with appropriate resources  11/9/2023 0058 by Cecil Martel RN  Outcome: Progressing  Flowsheets (Taken 11/8/2023 2058)  Discharge to home or other facility with appropriate resources: Identify barriers to discharge with patient and caregiver     Problem: Pain  Goal: Verbalizes/displays adequate comfort level or baseline comfort level  11/9/2023 0058 by Cecil Martel RN  Outcome: Progressing     Problem: Safety - Adult  Goal: Free from fall injury  11/9/2023 0058 by Cecil Martel RN  Outcome: Progressing     Problem: Skin/Tissue Integrity  Goal: Absence of new skin breakdown  Description: 1. Monitor for areas of redness and/or skin breakdown  2. Assess vascular access sites hourly  3. Every 4-6 hours minimum:  Change oxygen saturation probe site  4. Every 4-6 hours:  If on nasal continuous positive airway pressure, respiratory therapy assess nares and determine need for appliance change or resting period.   11/9/2023 0058 by Cecil Martel RN  Outcome: Progressing     Problem: ABCDS Injury Assessment  Goal: Absence of physical injury  11/9/2023 0058 by Cecil Martel RN  Outcome: Progressing     Problem: Respiratory - Adult  Goal: Achieves optimal ventilation and oxygenation  11/9/2023 0058 by Cecil Martel RN  Outcome: Progressing

## 2023-11-09 NOTE — FLOWSHEET NOTE
11/08/23 2056 11/08/23 2108   Vital Signs   BP (!) 110/52 113/60   MAP (Calculated) 71 78   BP Location Left upper arm Right upper arm   BP Method Automatic Automatic   Patient Position Semi fowlers Semi fowlers     Perfect serve sent to [address], NP regarding: Patient's /52 mmhg, repeated on the other arm, 113/60. Patient denies any dizziness. She has scheduled Lopressor 25 mg at night. Would you like me hold off this dose?  Read 9:19 PM   11/8/23 9:19 PM   ?go ahead and give

## 2023-11-09 NOTE — CARE COORDINATION
DISCHARGE ORDER  Date/Time 2023 2:21 PM  Completed by: Ethel Galeana RN, Case Management    Patient Name: Pita Bangura      : 1934  Admitting Diagnosis: Debility [R53.81]  Unsteady gait [R26.81]  Lumbosacral strain, initial encounter [C66.916Z]      Admit order Date and Status:11/3/23 inpt  (verify MD's last order for status of admission)      Noted discharge order. If applicable PT/OT recommendation at Discharge: Home with PRN assistance and Home PT  DME recommendation by PT/OT:Continue to Assess  Confirmed discharge plan  : Yes  with whom patient  If pt confirmed DC plan does family need to be contacted by CM No   Discharge Plan: [address] for dc noted. Spoke with pt who is now planninghome as therapy states pt has improved. Discussed HHC and pt chooses [facility]. Referral called to Bebeto Trujillo at [facility] who will arrange for [address] needs. No other dc needs identified. Date of Last IMM Given: 23    Reviewed chart. Role of discharge planner explained and patient verbalized understanding. Discharge order is noted. Has Home O2 in place on admit:  No  Informed of need to bring portable home O2 tank on day of discharge for nursing to connect prior to leaving:   Not Indicated  Verbalized agreement/Understanding:   Not Indicated  Pt is being d/c'd to home today. Pt's O2 sats are 93% on RA. Discharge timeout done with nsg, CM and pt. All discharge needs and concerns addressed.

## 2023-11-09 NOTE — CARE COORDINATION
[facility]  Received referral regarding HC services from [address] [place] [address]. Sent request to General acute hospital for [facility] [place] coverage. [facility] is able to service for [facility].. Aware of discharge home today. CTN to follow up with pt. Sent referral to General acute hospital.     Electronically signed by Jade Hamman, RN on 11/9/2023 at 12:44 PM

## 2023-11-10 ENCOUNTER — TELEPHONE (OUTPATIENT)
Dept: FAMILY MEDICINE CLINIC | Age: 88
End: 2023-11-10

## 2023-11-10 NOTE — TELEPHONE ENCOUNTER
Care Transitions Initial Follow Up Call    Outreach made within 2 business days of discharge: Yes    Patient: Milla Hutchinson Patient : 1934   MRN: [id number]  Reason for Admission: There are no discharge diagnoses documented for the most recent discharge. Discharge Date: 23       Spoke with: Rosie Garrison department/facility: [facility]    TCM Interactive Patient Contact:  Was patient able to fill all prescriptions: Yes  Was patient instructed to bring all medications to the follow-up visit: Yes  Is patient taking all medications as directed in the discharge summary?  Yes  Does patient understand their discharge instructions: Yes  Does patient have questions or concerns that need addressed prior to 7-14 day follow up office visit: no    Scheduled appointment with PCP within 7-14 days    Follow Up  Future Appointments   Date Time Provider [address] [place] [address]   2023  9:30 AM JUSTIN Jaquez - CNP [facility] [place]   2023  2:30 PM MD Tiffany Martinez LPN

## 2023-11-15 NOTE — TELEPHONE ENCOUNTER
Jonelle ALMODOVAR called the office asking if Robaxin could be called into [facility] for the patient as she was in the hospital for lumbosacral strain, states that the Percocet isn't helping with the muscle pain. Please advise, thanks.      [address] [phone]

## 2023-11-16 LAB
ANION GAP SERPL CALCULATED.3IONS-SCNC: 12 MMOL/L (ref 3–16)
BUN SERPL-MCNC: 21 MG/DL (ref 7–20)
CALCIUM SERPL-MCNC: 10.4 MG/DL (ref 8.3–10.6)
CHLORIDE SERPL-SCNC: 110 MMOL/L (ref 99–110)
CO2 SERPL-SCNC: 23 MMOL/L (ref 21–32)
CREAT SERPL-MCNC: 0.6 MG/DL (ref 0.6–1.2)
GFR SERPLBLD CREATININE-BSD FMLA CKD-EPI: >60 ML/MIN/{1.73_M2}
GLUCOSE SERPL-MCNC: 87 MG/DL (ref 70–99)
POTASSIUM SERPL-SCNC: 5 MMOL/L (ref 3.5–5.1)
SODIUM SERPL-SCNC: 145 MMOL/L (ref 136–145)

## 2023-11-16 NOTE — TELEPHONE ENCOUNTER
Patient returned call and stated that she would like the RX to be sent to [facility].  Pended to the medication request.

## 2023-11-16 NOTE — TELEPHONE ENCOUNTER
Which [facility]? Correct pharmacy not pended. Also, insurance will not cover Robaxin as it is not considered safe for the elderly. We could send in tizanidine instead.

## 2023-11-17 ENCOUNTER — HOSPITAL ENCOUNTER (INPATIENT)
Age: 88
LOS: 3 days | Discharge: NURSING, REHAB OR LONG-TERM CARE | DRG: 884 | End: 2023-11-21
Attending: STUDENT IN AN ORGANIZED HEALTH CARE EDUCATION/TRAINING PROGRAM | Admitting: SURGERY
Payer: MEDICARE

## 2023-11-17 DIAGNOSIS — M54.50 SEVERE LOW BACK PAIN: Primary | ICD-10-CM

## 2023-11-17 DIAGNOSIS — M51.26 DISPLACEMENT OF LUMBAR INTERVERTEBRAL DISC WITHOUT MYELOPATHY: ICD-10-CM

## 2023-11-17 DIAGNOSIS — Z78.9 UNABLE TO CARE FOR SELF: ICD-10-CM

## 2023-11-17 DIAGNOSIS — S39.012A LUMBOSACRAL STRAIN, INITIAL ENCOUNTER: ICD-10-CM

## 2023-11-17 DIAGNOSIS — M48.07 SPINAL STENOSIS OF LUMBOSACRAL REGION: ICD-10-CM

## 2023-11-17 LAB
ALBUMIN SERPL-MCNC: 4.4 G/DL (ref 3.4–5)
ALBUMIN/GLOB SERPL: 1.4 {RATIO} (ref 1.1–2.2)
ALP SERPL-CCNC: 69 U/L (ref 40–129)
ALT SERPL-CCNC: 30 U/L (ref 10–40)
ANION GAP SERPL CALCULATED.3IONS-SCNC: 10 MMOL/L (ref 3–16)
AST SERPL-CCNC: 21 U/L (ref 15–37)
BASOPHILS # BLD: 0 K/UL (ref 0–0.2)
BASOPHILS NFR BLD: 0.3 %
BILIRUB SERPL-MCNC: 0.5 MG/DL (ref 0–1)
BUN SERPL-MCNC: 17 MG/DL (ref 7–20)
CALCIUM SERPL-MCNC: 10.3 MG/DL (ref 8.3–10.6)
CHLORIDE SERPL-SCNC: 106 MMOL/L (ref 99–110)
CO2 SERPL-SCNC: 26 MMOL/L (ref 21–32)
CREAT SERPL-MCNC: 0.6 MG/DL (ref 0.6–1.2)
DEPRECATED RDW RBC AUTO: 13.6 % (ref 12.4–15.4)
EOSINOPHIL # BLD: 0.2 K/UL (ref 0–0.6)
EOSINOPHIL NFR BLD: 1.6 %
GFR SERPLBLD CREATININE-BSD FMLA CKD-EPI: >60 ML/MIN/{1.73_M2}
GLUCOSE SERPL-MCNC: 133 MG/DL (ref 70–99)
HCT VFR BLD AUTO: 37.4 % (ref 36–48)
HGB BLD-MCNC: 12.5 G/DL (ref 12–16)
LYMPHOCYTES # BLD: 1.7 K/UL (ref 1–5.1)
LYMPHOCYTES NFR BLD: 15.2 %
MCH RBC QN AUTO: 30 PG (ref 26–34)
MCHC RBC AUTO-ENTMCNC: 33.4 G/DL (ref 31–36)
MCV RBC AUTO: 90 FL (ref 80–100)
MONOCYTES # BLD: 0.5 K/UL (ref 0–1.3)
MONOCYTES NFR BLD: 4.6 %
NEUTROPHILS # BLD: 8.7 K/UL (ref 1.7–7.7)
NEUTROPHILS NFR BLD: 78.3 %
PLATELET # BLD AUTO: 213 K/UL (ref 135–450)
PMV BLD AUTO: 8.4 FL (ref 5–10.5)
POTASSIUM SERPL-SCNC: 4.3 MMOL/L (ref 3.5–5.1)
PROT SERPL-MCNC: 7.6 G/DL (ref 6.4–8.2)
RBC # BLD AUTO: 4.16 M/UL (ref 4–5.2)
SODIUM SERPL-SCNC: 142 MMOL/L (ref 136–145)
WBC # BLD AUTO: 11.1 K/UL (ref 4–11)

## 2023-11-17 PROCEDURE — 96375 TX/PRO/DX INJ NEW DRUG ADDON: CPT

## 2023-11-17 PROCEDURE — 36415 COLL VENOUS BLD VENIPUNCTURE: CPT

## 2023-11-17 PROCEDURE — 96374 THER/PROPH/DIAG INJ IV PUSH: CPT

## 2023-11-17 PROCEDURE — 6360000002 HC RX W HCPCS: Performed by: PHYSICIAN ASSISTANT

## 2023-11-17 PROCEDURE — 6370000000 HC RX 637 (ALT 250 FOR IP): Performed by: PHYSICIAN ASSISTANT

## 2023-11-17 PROCEDURE — 85025 COMPLETE CBC W/AUTO DIFF WBC: CPT

## 2023-11-17 PROCEDURE — 80053 COMPREHEN METABOLIC PANEL: CPT

## 2023-11-17 PROCEDURE — 99285 EMERGENCY DEPT VISIT HI MDM: CPT

## 2023-11-17 RX ORDER — ONDANSETRON 2 MG/ML
4 INJECTION INTRAMUSCULAR; INTRAVENOUS ONCE
Status: COMPLETED | OUTPATIENT
Start: 2023-11-17 | End: 2023-11-17

## 2023-11-17 RX ORDER — ONDANSETRON 4 MG/1
4 TABLET, ORALLY DISINTEGRATING ORAL ONCE
Status: COMPLETED | OUTPATIENT
Start: 2023-11-17 | End: 2023-11-17

## 2023-11-17 RX ORDER — TIZANIDINE 4 MG/1
4 TABLET ORAL EVERY 6 HOURS PRN
Status: CANCELLED | OUTPATIENT
Start: 2023-11-17

## 2023-11-17 RX ORDER — TIZANIDINE 4 MG/1
4 TABLET ORAL EVERY 6 HOURS PRN
Status: DISCONTINUED | OUTPATIENT
Start: 2023-11-17 | End: 2023-11-21 | Stop reason: HOSPADM

## 2023-11-17 RX ORDER — OXYCODONE HYDROCHLORIDE AND ACETAMINOPHEN 5; 325 MG/1; MG/1
1 TABLET ORAL ONCE
Status: COMPLETED | OUTPATIENT
Start: 2023-11-17 | End: 2023-11-17

## 2023-11-17 RX ORDER — POLYETHYLENE GLYCOL 3350 17 G/17G
17 POWDER, FOR SOLUTION ORAL ONCE
Status: COMPLETED | OUTPATIENT
Start: 2023-11-17 | End: 2023-11-17

## 2023-11-17 RX ORDER — TIZANIDINE 2 MG/1
2-4 TABLET ORAL EVERY 6 HOURS PRN
Qty: 80 TABLET | Refills: 0 | Status: ON HOLD | OUTPATIENT
Start: 2023-11-17 | End: 2023-11-21 | Stop reason: HOSPADM

## 2023-11-17 RX ORDER — MORPHINE SULFATE 4 MG/ML
4 INJECTION, SOLUTION INTRAMUSCULAR; INTRAVENOUS ONCE
Status: COMPLETED | OUTPATIENT
Start: 2023-11-17 | End: 2023-11-17

## 2023-11-17 RX ADMIN — OXYCODONE HYDROCHLORIDE AND ACETAMINOPHEN 1 TABLET: 5; 325 TABLET ORAL at 21:38

## 2023-11-17 RX ADMIN — MORPHINE SULFATE 4 MG: 4 INJECTION, SOLUTION INTRAMUSCULAR; INTRAVENOUS at 20:00

## 2023-11-17 RX ADMIN — ONDANSETRON 4 MG: 2 INJECTION INTRAMUSCULAR; INTRAVENOUS at 20:00

## 2023-11-17 RX ADMIN — POLYETHYLENE GLYCOL (3350) 17 G: 17 POWDER, FOR SOLUTION ORAL at 20:00

## 2023-11-17 RX ADMIN — ONDANSETRON 4 MG: 4 TABLET, ORALLY DISINTEGRATING ORAL at 21:26

## 2023-11-17 RX ADMIN — TIZANIDINE 4 MG: 4 TABLET ORAL at 21:38

## 2023-11-17 ASSESSMENT — PAIN DESCRIPTION - PAIN TYPE: TYPE: ACUTE PAIN

## 2023-11-17 ASSESSMENT — PAIN SCALES - GENERAL
PAINLEVEL_OUTOF10: 10
PAINLEVEL_OUTOF10: 6

## 2023-11-17 ASSESSMENT — PAIN - FUNCTIONAL ASSESSMENT: PAIN_FUNCTIONAL_ASSESSMENT: 0-10

## 2023-11-17 ASSESSMENT — PAIN DESCRIPTION - LOCATION
LOCATION: BACK
LOCATION: BACK

## 2023-11-17 ASSESSMENT — PAIN DESCRIPTION - DESCRIPTORS
DESCRIPTORS: SPASM
DESCRIPTORS: SPASM

## 2023-11-17 ASSESSMENT — PAIN DESCRIPTION - FREQUENCY: FREQUENCY: INTERMITTENT

## 2023-11-17 ASSESSMENT — PAIN DESCRIPTION - ORIENTATION: ORIENTATION: LOWER

## 2023-11-17 NOTE — ED PROVIDER NOTES
I independently performed a history and physical on Martha Khan. I have completed a substantive portion of the visit including all aspects of the medical decision making. All diagnostic, treatment, and disposition decisions were made by myself in conjunction with the advanced practice provider/resident. In summary the patient presents with refractory lower back pain and spasms similar to prior hospitalization that are now compromising the patient's ability to safely ambulate at home and attend to activities of daily living. Consideration is given to adequate pain control at home as well. On my examination she is alert in little acute distress. Breath sounds are clear abdomen is soft nontender nondistended extremities are warm and well-perfused. Equal radial pulses no significant peripheral edema. She does demonstrate apparent baseline strength and motor function of the lower extremities during initial exam and is sensate low concern for a new neurologic or vascular process involving the lower back and lower extremities this would appear to be an acute on chronic process of lower back pain now with debilitating symptoms not safe for outpatient management. We will plan for admission for placement to acute rehab which is the patient's and family at bedside desire. For further details of [facility] emergency department encounter, please see the NICOLE/resident's documentation.       MD Lenard Petty MD  11/17/23 2580

## 2023-11-17 NOTE — TELEPHONE ENCOUNTER
Please advise on sending in the tizanidine.  Pt calling in she is having really bad spasms and when they hit her she screams out in pain please advise on sending this medication in

## 2023-11-17 NOTE — ED PROVIDER NOTES
[address] [address] ED  EMERGENCY DEPARTMENT ENCOUNTER        Pt Name: Mateo Jenkins  MRN: [id number]  [facility] 12/30/1934  Date of evaluation: 11/17/2023  Provider: Alia Vickers PA-C  PCP: Duglas Jamison MD  Note Started: 6:02 PM EST 11/17/23       I have seen and evaluated this patient with my supervising physician Mónica Colvin, [address] [place] [address] [address]       Chief Complaint   Patient presents with    Back Pain     Back spasms on/off greater than 2 weeks. Pt had 4 treatments of home therapy, no longer helpful. HISTORY OF PRESENT ILLNESS: 1 or more Elements     History From: Patient and daughters  Limitations to history : None    Mateo Jenkins is a 80 y.o. female who presents to the emergency department for severe low back pain. Has history of chronic back pain previous back surgery, has spinal stenosis and disc displacement. Is on oxycodone at home. Recent admission here after fall 2 weeks ago family states they wanted her to be placed at [facility] for rehab but she was sent home they did try rehab at home did not help and she is still having severe pain they are unable to be with her 24 hours a day to help her and she cannot care for herself states she is even having difficulty with walking because of the pain states she will get a severe spasm in her back and she just falls she has not fallen since the last visit here 2 weeks ago. Pain is to lower back. Called her doctor today who called her in tizanidine which she took 2 at 10 AM and last had her oxycodone at 2 PM today. Denies any abdominal pain. No chest pain. No urinary symptoms. No new numbness or weakness of extremities. Moving both extremities intact sensation equal strength. Nursing Notes were all reviewed and agreed with or any disagreements were addressed in the HPI. REVIEW OF SYSTEMS :      Review of Systems    Positives and Pertinent negatives as per HPI.      SURGICAL HISTORY     Past Surgical

## 2023-11-18 PROBLEM — M62.830 BACK SPASM: Status: ACTIVE | Noted: 2023-11-18

## 2023-11-18 PROBLEM — M48.07 SPINAL STENOSIS OF LUMBOSACRAL REGION: Status: ACTIVE | Noted: 2020-01-22

## 2023-11-18 PROBLEM — Z78.9 UNABLE TO CARE FOR SELF: Status: ACTIVE | Noted: 2023-11-18

## 2023-11-18 PROBLEM — M54.50 SEVERE LOW BACK PAIN: Status: ACTIVE | Noted: 2023-11-18

## 2023-11-18 LAB
BACTERIA URNS QL MICRO: ABNORMAL /HPF
BILIRUB UR QL STRIP.AUTO: ABNORMAL
CLARITY UR: ABNORMAL
COLOR UR: ABNORMAL
CRYSTALS URNS MICRO: ABNORMAL /HPF
EPI CELLS #/AREA URNS HPF: ABNORMAL /HPF (ref 0–5)
GLUCOSE UR STRIP.AUTO-MCNC: NEGATIVE MG/DL
HGB UR QL STRIP.AUTO: NEGATIVE
KETONES UR STRIP.AUTO-MCNC: NEGATIVE MG/DL
LEUKOCYTE ESTERASE UR QL STRIP.AUTO: ABNORMAL
NITRITE UR QL STRIP.AUTO: NEGATIVE
PH UR STRIP.AUTO: 8.5 [PH] (ref 5–8)
PROT UR STRIP.AUTO-MCNC: ABNORMAL MG/DL
RBC #/AREA URNS HPF: ABNORMAL /HPF (ref 0–4)
RENAL EPI CELLS #/AREA UR COMP ASSIST: ABNORMAL /HPF (ref 0–1)
SP GR UR STRIP.AUTO: 1.01 (ref 1–1.03)
UA COMPLETE W REFLEX CULTURE PNL UR: ABNORMAL
UA DIPSTICK W REFLEX MICRO PNL UR: YES
URN SPEC COLLECT METH UR: ABNORMAL
UROBILINOGEN UR STRIP-ACNC: 1 E.U./DL
WBC #/AREA URNS HPF: ABNORMAL /HPF (ref 0–5)

## 2023-11-18 PROCEDURE — 6370000000 HC RX 637 (ALT 250 FOR IP): Performed by: PHYSICIAN ASSISTANT

## 2023-11-18 PROCEDURE — 81001 URINALYSIS AUTO W/SCOPE: CPT

## 2023-11-18 PROCEDURE — 94761 N-INVAS EAR/PLS OXIMETRY MLT: CPT

## 2023-11-18 PROCEDURE — 6370000000 HC RX 637 (ALT 250 FOR IP): Performed by: SURGERY

## 2023-11-18 PROCEDURE — 97162 PT EVAL MOD COMPLEX 30 MIN: CPT

## 2023-11-18 PROCEDURE — 6370000000 HC RX 637 (ALT 250 FOR IP): Performed by: INTERNAL MEDICINE

## 2023-11-18 PROCEDURE — 2580000003 HC RX 258: Performed by: SURGERY

## 2023-11-18 PROCEDURE — 97166 OT EVAL MOD COMPLEX 45 MIN: CPT

## 2023-11-18 PROCEDURE — 97530 THERAPEUTIC ACTIVITIES: CPT

## 2023-11-18 PROCEDURE — 94640 AIRWAY INHALATION TREATMENT: CPT

## 2023-11-18 PROCEDURE — 6360000002 HC RX W HCPCS: Performed by: SURGERY

## 2023-11-18 PROCEDURE — 99232 SBSQ HOSP IP/OBS MODERATE 35: CPT | Performed by: INTERNAL MEDICINE

## 2023-11-18 PROCEDURE — 1200000000 HC SEMI PRIVATE

## 2023-11-18 RX ORDER — DESMOPRESSIN ACETATE 0.1 MG/1
0.1 TABLET ORAL NIGHTLY
COMMUNITY

## 2023-11-18 RX ORDER — FLUTICASONE PROPIONATE 50 MCG
1 SPRAY, SUSPENSION (ML) NASAL DAILY
Status: DISCONTINUED | OUTPATIENT
Start: 2023-11-18 | End: 2023-11-21 | Stop reason: HOSPADM

## 2023-11-18 RX ORDER — SODIUM CHLORIDE 9 MG/ML
INJECTION, SOLUTION INTRAVENOUS PRN
Status: DISCONTINUED | OUTPATIENT
Start: 2023-11-18 | End: 2023-11-21 | Stop reason: HOSPADM

## 2023-11-18 RX ORDER — POTASSIUM CHLORIDE 7.45 MG/ML
10 INJECTION INTRAVENOUS PRN
Status: DISCONTINUED | OUTPATIENT
Start: 2023-11-18 | End: 2023-11-21 | Stop reason: HOSPADM

## 2023-11-18 RX ORDER — TROSPIUM CHLORIDE 20 MG/1
20 TABLET, FILM COATED ORAL NIGHTLY
Status: DISCONTINUED | OUTPATIENT
Start: 2023-11-18 | End: 2023-11-21 | Stop reason: HOSPADM

## 2023-11-18 RX ORDER — SODIUM CHLORIDE 0.9 % (FLUSH) 0.9 %
5-40 SYRINGE (ML) INJECTION PRN
Status: DISCONTINUED | OUTPATIENT
Start: 2023-11-18 | End: 2023-11-21 | Stop reason: HOSPADM

## 2023-11-18 RX ORDER — POLYETHYLENE GLYCOL 3350 17 G/17G
17 POWDER, FOR SOLUTION ORAL DAILY
Status: DISCONTINUED | OUTPATIENT
Start: 2023-11-18 | End: 2023-11-19

## 2023-11-18 RX ORDER — LISINOPRIL AND HYDROCHLOROTHIAZIDE 25; 20 MG/1; MG/1
1 TABLET ORAL DAILY
COMMUNITY

## 2023-11-18 RX ORDER — POTASSIUM CHLORIDE 20 MEQ/1
40 TABLET, EXTENDED RELEASE ORAL PRN
Status: DISCONTINUED | OUTPATIENT
Start: 2023-11-18 | End: 2023-11-21 | Stop reason: HOSPADM

## 2023-11-18 RX ORDER — OXYCODONE HYDROCHLORIDE AND ACETAMINOPHEN 5; 325 MG/1; MG/1
2 TABLET ORAL EVERY 4 HOURS PRN
Status: DISCONTINUED | OUTPATIENT
Start: 2023-11-18 | End: 2023-11-18

## 2023-11-18 RX ORDER — AMLODIPINE BESYLATE 5 MG/1
5 TABLET ORAL NIGHTLY
Status: DISCONTINUED | OUTPATIENT
Start: 2023-11-18 | End: 2023-11-21 | Stop reason: HOSPADM

## 2023-11-18 RX ORDER — OXYCODONE HYDROCHLORIDE AND ACETAMINOPHEN 5; 325 MG/1; MG/1
1 TABLET ORAL EVERY 6 HOURS PRN
Status: DISCONTINUED | OUTPATIENT
Start: 2023-11-18 | End: 2023-11-21 | Stop reason: HOSPADM

## 2023-11-18 RX ORDER — MAGNESIUM SULFATE IN WATER 40 MG/ML
2000 INJECTION, SOLUTION INTRAVENOUS PRN
Status: DISCONTINUED | OUTPATIENT
Start: 2023-11-18 | End: 2023-11-21 | Stop reason: HOSPADM

## 2023-11-18 RX ORDER — ALBUTEROL SULFATE 90 UG/1
2 AEROSOL, METERED RESPIRATORY (INHALATION) EVERY 4 HOURS PRN
Status: DISCONTINUED | OUTPATIENT
Start: 2023-11-18 | End: 2023-11-21 | Stop reason: HOSPADM

## 2023-11-18 RX ORDER — LIDOCAINE 4 G/G
1 PATCH TOPICAL DAILY
Status: DISCONTINUED | OUTPATIENT
Start: 2023-11-18 | End: 2023-11-21 | Stop reason: HOSPADM

## 2023-11-18 RX ORDER — DOCUSATE SODIUM 100 MG/1
100 CAPSULE, LIQUID FILLED ORAL DAILY
Status: DISCONTINUED | OUTPATIENT
Start: 2023-11-18 | End: 2023-11-19

## 2023-11-18 RX ORDER — ENOXAPARIN SODIUM 100 MG/ML
40 INJECTION SUBCUTANEOUS DAILY
Status: DISCONTINUED | OUTPATIENT
Start: 2023-11-18 | End: 2023-11-21 | Stop reason: HOSPADM

## 2023-11-18 RX ORDER — CARBOXYMETHYLCELLULOSE SODIUM 10 MG/ML
1 GEL OPHTHALMIC 2 TIMES DAILY
Status: DISCONTINUED | OUTPATIENT
Start: 2023-11-18 | End: 2023-11-21 | Stop reason: HOSPADM

## 2023-11-18 RX ORDER — ALBUTEROL SULFATE 90 UG/1
2 AEROSOL, METERED RESPIRATORY (INHALATION) NIGHTLY
Status: DISCONTINUED | OUTPATIENT
Start: 2023-11-18 | End: 2023-11-21 | Stop reason: HOSPADM

## 2023-11-18 RX ORDER — ACETAMINOPHEN 325 MG/1
650 TABLET ORAL EVERY 6 HOURS PRN
Status: DISCONTINUED | OUTPATIENT
Start: 2023-11-18 | End: 2023-11-21 | Stop reason: HOSPADM

## 2023-11-18 RX ORDER — SODIUM CHLORIDE 0.9 % (FLUSH) 0.9 %
5-40 SYRINGE (ML) INJECTION EVERY 12 HOURS SCHEDULED
Status: DISCONTINUED | OUTPATIENT
Start: 2023-11-18 | End: 2023-11-21 | Stop reason: HOSPADM

## 2023-11-18 RX ORDER — ACETAMINOPHEN 650 MG/1
650 SUPPOSITORY RECTAL EVERY 6 HOURS PRN
Status: DISCONTINUED | OUTPATIENT
Start: 2023-11-18 | End: 2023-11-21 | Stop reason: HOSPADM

## 2023-11-18 RX ORDER — ONDANSETRON 2 MG/ML
4 INJECTION INTRAMUSCULAR; INTRAVENOUS EVERY 6 HOURS PRN
Status: DISCONTINUED | OUTPATIENT
Start: 2023-11-18 | End: 2023-11-21 | Stop reason: HOSPADM

## 2023-11-18 RX ORDER — ASPIRIN 81 MG/1
81 TABLET, CHEWABLE ORAL DAILY
Status: DISCONTINUED | OUTPATIENT
Start: 2023-11-18 | End: 2023-11-21 | Stop reason: HOSPADM

## 2023-11-18 RX ORDER — POLYETHYLENE GLYCOL 3350 17 G/17G
17 POWDER, FOR SOLUTION ORAL DAILY PRN
Status: DISCONTINUED | OUTPATIENT
Start: 2023-11-18 | End: 2023-11-21 | Stop reason: HOSPADM

## 2023-11-18 RX ORDER — LISINOPRIL 20 MG/1
20 TABLET ORAL DAILY
Status: DISCONTINUED | OUTPATIENT
Start: 2023-11-18 | End: 2023-11-21 | Stop reason: HOSPADM

## 2023-11-18 RX ORDER — OXYCODONE HYDROCHLORIDE AND ACETAMINOPHEN 5; 325 MG/1; MG/1
1 TABLET ORAL EVERY 4 HOURS PRN
Status: DISCONTINUED | OUTPATIENT
Start: 2023-11-18 | End: 2023-11-18

## 2023-11-18 RX ORDER — ATORVASTATIN CALCIUM 40 MG/1
40 TABLET, FILM COATED ORAL DAILY
Status: DISCONTINUED | OUTPATIENT
Start: 2023-11-18 | End: 2023-11-21 | Stop reason: HOSPADM

## 2023-11-18 RX ORDER — ONDANSETRON 4 MG/1
4 TABLET, ORALLY DISINTEGRATING ORAL EVERY 8 HOURS PRN
Status: DISCONTINUED | OUTPATIENT
Start: 2023-11-18 | End: 2023-11-21 | Stop reason: HOSPADM

## 2023-11-18 RX ADMIN — CARBOXYMETHYLCELLULOSE SODIUM 1 DROP: 10 GEL OPHTHALMIC at 21:50

## 2023-11-18 RX ADMIN — TIZANIDINE 4 MG: 4 TABLET ORAL at 15:27

## 2023-11-18 RX ADMIN — METOPROLOL TARTRATE 25 MG: 25 TABLET, FILM COATED ORAL at 21:50

## 2023-11-18 RX ADMIN — TIZANIDINE 4 MG: 4 TABLET ORAL at 21:50

## 2023-11-18 RX ADMIN — AMLODIPINE BESYLATE 5 MG: 5 TABLET ORAL at 21:50

## 2023-11-18 RX ADMIN — Medication 10 ML: at 21:49

## 2023-11-18 RX ADMIN — OXYCODONE AND ACETAMINOPHEN 1 TABLET: 5; 325 TABLET ORAL at 10:35

## 2023-11-18 RX ADMIN — TIZANIDINE 4 MG: 4 TABLET ORAL at 04:43

## 2023-11-18 RX ADMIN — ATORVASTATIN CALCIUM 40 MG: 40 TABLET, FILM COATED ORAL at 21:50

## 2023-11-18 RX ADMIN — METOPROLOL TARTRATE 25 MG: 25 TABLET, FILM COATED ORAL at 10:33

## 2023-11-18 RX ADMIN — POLYETHYLENE GLYCOL (3350) 17 G: 17 POWDER, FOR SOLUTION ORAL at 10:33

## 2023-11-18 RX ADMIN — TROSPIUM CHLORIDE 20 MG: 20 TABLET, FILM COATED ORAL at 21:50

## 2023-11-18 RX ADMIN — POLYETHYLENE GLYCOL (3350) 17 G: 17 POWDER, FOR SOLUTION ORAL at 21:53

## 2023-11-18 RX ADMIN — OXYCODONE AND ACETAMINOPHEN 1 TABLET: 5; 325 TABLET ORAL at 17:11

## 2023-11-18 RX ADMIN — ENOXAPARIN SODIUM 40 MG: 100 INJECTION SUBCUTANEOUS at 10:34

## 2023-11-18 RX ADMIN — ASPIRIN 81 MG: 81 TABLET, CHEWABLE ORAL at 10:33

## 2023-11-18 RX ADMIN — FLUTICASONE PROPIONATE 1 SPRAY: 50 SPRAY, METERED NASAL at 21:50

## 2023-11-18 RX ADMIN — LISINOPRIL 20 MG: 20 TABLET ORAL at 10:33

## 2023-11-18 RX ADMIN — Medication 10 ML: at 10:39

## 2023-11-18 RX ADMIN — Medication 2 PUFF: at 20:20

## 2023-11-18 RX ADMIN — DOCUSATE SODIUM 100 MG: 100 CAPSULE, LIQUID FILLED ORAL at 10:33

## 2023-11-18 ASSESSMENT — PAIN DESCRIPTION - DESCRIPTORS
DESCRIPTORS: ACHING
DESCRIPTORS: SPASM
DESCRIPTORS: ACHING;SPASM
DESCRIPTORS: SPASM;ACHING

## 2023-11-18 ASSESSMENT — PAIN SCALES - GENERAL
PAINLEVEL_OUTOF10: 8
PAINLEVEL_OUTOF10: 8
PAINLEVEL_OUTOF10: 10
PAINLEVEL_OUTOF10: 0
PAINLEVEL_OUTOF10: 8
PAINLEVEL_OUTOF10: 6
PAINLEVEL_OUTOF10: 8
PAINLEVEL_OUTOF10: 0

## 2023-11-18 ASSESSMENT — PAIN DESCRIPTION - LOCATION
LOCATION: BACK

## 2023-11-18 ASSESSMENT — PAIN DESCRIPTION - ORIENTATION
ORIENTATION: LEFT;LOWER
ORIENTATION: LEFT;LOWER
ORIENTATION: LOWER
ORIENTATION: LOWER;LEFT

## 2023-11-18 ASSESSMENT — LIFESTYLE VARIABLES
HOW MANY STANDARD DRINKS CONTAINING ALCOHOL DO YOU HAVE ON A TYPICAL DAY: PATIENT DOES NOT DRINK
HOW OFTEN DO YOU HAVE A DRINK CONTAINING ALCOHOL: NEVER

## 2023-11-18 ASSESSMENT — PAIN - FUNCTIONAL ASSESSMENT
PAIN_FUNCTIONAL_ASSESSMENT: PREVENTS OR INTERFERES SOME ACTIVE ACTIVITIES AND ADLS
PAIN_FUNCTIONAL_ASSESSMENT: PREVENTS OR INTERFERES SOME ACTIVE ACTIVITIES AND ADLS

## 2023-11-18 NOTE — PROGRESS NOTES
Admission assessment completed, see flow sheet. Patient is alert and oriented x 4. Respirations are easy and unlabored. Patient denies needs at this time. Side rails up x 2, and bed in low position. Call light is within reach.

## 2023-11-18 NOTE — PROGRESS NOTES
4 Eyes Skin Assessment     NAME:  Carol Gomez  YOB: 1934  MEDICAL RECORD NUMBER:  [id number]    The patient is being assessed for  Admission    I agree that at least one RN has performed a thorough Head to Toe Skin Assessment on the patient. ALL assessment sites listed below have been assessed. Areas assessed by both nurses:    Head, Face, Ears, Shoulders, Back, Chest, Arms, Elbows, Hands, Sacrum. Buttock, Coccyx, Ischium, and Legs. Feet and Heels    Scattered bruising. Does the Patient have a Wound?  No noted wound(s)       Tl Prevention initiated by RN: No  Wound Care Orders initiated by RN: No    Pressure Injury (Stage 3,4, Unstageable, DTI, NWPT, and Complex wounds) if present, place Wound referral order by RN under : No    New Ostomies, if present place, Ostomy referral order under : No     Nurse 1 eSignature: Electronically signed by Elva Shepherd RN on 11/18/23 at 4:03 AM EST    **SHARE this note so that the co-signing nurse can place an eSignature**    Nurse 2 eSignature: {Esignature:[id number]}

## 2023-11-18 NOTE — PROGRESS NOTES
Shift assessment complete, see flow sheet, Pt VSS. Bed in lowest position, pt positioned for comfort and pain relief, bed alarm activated for pt safety,Call light and bed side table within reach, pt educated on use of call light if needing assist., pt verbalized understanding, denies further questions at this time. Denies any needs at this time, continue to monitor.

## 2023-11-18 NOTE — PROGRESS NOTES
Inpatient Occupational Therapy Evaluation and Treatment    Unit: [facility]  Date:  11/18/2023  Patient Name:    Karina Small  Admitting diagnosis:  Displacement of lumbar intervertebral disc without myelopathy [M51.26]  Back spasm [M62.830]  Severe low back pain [M54.50]  Spinal stenosis of lumbosacral region [M48.07]  Unable to care for self [Z78.9]  Admit Date:  11/17/2023  Precautions/Restrictions/WB Status/ Lines/ Wounds/ Oxygen: Fall risk, Bed/chair alarm, and Lines (IV and external catheter)    Pt seen for cotreatment this date due to patient safety, patient endurance, complexity of condition, acute illness/injury, and need for the assistance of 2 skilled therapists    Treatment Time:  2723-7781  Treatment Number:  1  Timed Code Treatment Minutes: 25 minutes  Total Treatment Minutes:  35  minutes    Patient Goals for Therapy: \"to go to rehab \"          Discharge Recommendations: SNF  DME needs for discharge: Continue to Assess       Therapy recommendations for staff:   Assist of 1-2 for transfers with use of gait belt and at discretion of the RN related to spasms to/from [facility]    History of Present Illness: Chief Complaint: stated in first sentence of HPI  Karina Small is a 80 y.o. female who presents with low back pain and spasms similar to prior hospitalization. She is unable to safely ambulate or attend to ADLs. Will require admission for this reason and modification of pain regimen. Will likely require placemen. CT lumbar spine 11/3/2023:  IMPRESSION:  1. Postoperative changes in the lumbar spine with no complication. 2. No acute fracture or subluxation. 3. Degenerative changes are chronic at L1-2 and L2-3. Home Health S4 Level Recommendation:  NA    AM-PAC Score: AM-PAC Inpatient Daily Activity Raw Score: 10     Subjective:  Patient sitting EOB with no family present. Pt agreeable to this OT session.      Cognition:    A&O Person   Able to follow 2 step commands    Pain:   Yes  Location: low back Transfers. Pt functioning below baseline and will likely benefit from skilled occupational therapy services to maximize safety and independence. Recommending SNF upon discharge as patient functioning well below baseline, demonstrates good rehab potential and unable to return home due to limited or no family support, inability to negotiate stairs to enter home/bedroom/bathroom, burden of care beyond caregiver ability, and home environment not conducive to patient recovery. Goal(s) : To be met in 3 Visits:  Bed to toilet/BSC:       SBA  Pt will complete 3/3 CHF goals     N/A    To be met in 5 Visits:  Supine to/from Sit in preparation for ADL task:   SBA  Toileting        Min A  Grooming       Min A  Upper Body Dressing: Mod A  Lower Body Dressing: Mod A  Pt to demonstrate UE therapeutic exs x 15 reps with minimal cues    Rehabilitation Potential: Good  Strengths for achieving goals include: Pt motivated, PLOF, Family Support, and Pt cooperative   Barriers to achieving goals include:  Complexity of condition, Pain, and Weakness    Plan: To be seen 3-5 x/wk while in acute care setting for therapeutic exercises, bed mobility, transfers, family/patient education, ADL/IADL retraining, and energy conservation training.     Electronically signed by Maia Gonzalez OT on 11/18/2023 at 4:04 PM      If patient discharges from this facility prior to next visit, this note will serve as the Discharge Summary

## 2023-11-18 NOTE — ED NOTES
Writer received report from [employer] and is assuming care at this time. Pt is laying in bed with eyes closed in no apparent distress. Per report pt is hypotensive and ED DR and hospitalist aware.       Brina Ugalde RN  11/18/23 3271

## 2023-11-18 NOTE — PROGRESS NOTES
Bedside Mobility Assessment Tool (BMAT):     Assessment Level 1- Sit and Shake    1. From a semi-reclined position, ask patient to sit up and rotate to a seated position at the side of the bed. Can use the bedrail. 2. Ask patient to reach out and grab your hand and shake making sure patient reaches across his/her midline. Pass- Patient is able to come to a seated position, maintain core strength. Maintains seated balance while reaching across midline. Move on to Assessment Level 2. Assessment Level 2- Stretch and Point   1. With patient in seated position at the side of the bed, have patient place both feet on the floor (or stool) with knees no higher than hips. 2. Ask patient to stretch one leg and straighten the knee, then bend the ankle/flex and point the toes. If appropriate, repeat with the other leg. Pass- Patient is able to demonstrate appropriate quad strength on intended weight bearing limb(s). Move onto Assessment Level 3. Assessment Level 3- Stand   1. Ask patient to elevate off the bed or chair (seated to standing) using an assistive device (cane, bedrail). 2. Patient should be able to raise buttocks off be and hold for a count of five. May repeat once. Fail- Patient unable to demonstrate standing stability. Patient is MOBILITY LEVEL 3. Assessment Level 4- Walk   1. Ask patient to march in place at bedside. 2. Then ask patient to advance step and return each foot. Some medical conditions may render a patient from stepping backwards, use your best clinical judgement. Pass- Patient demonstrates balance while shifting weight and ability to step, takes independent steps, does not use assistive device patient is MOBILITY LEVEL 4.       Mobility Level- 3

## 2023-11-18 NOTE — ED NOTES
Report given to [facility]. Pt transferred to floor in stable condition.       Destiny Batista RN  11/18/23 1283

## 2023-11-18 NOTE — PROGRESS NOTES
PCA called to RN because pt having back spasms. Upon entry to room pt is tearful. PRN percocet given, see MAR. Pt requested RN to reach out to MD, because pt verbalized that percocet would not help this pain. Pt had PRN [place] @ 0547. Dr. Viral Cronin notified of above via [employer].

## 2023-11-18 NOTE — PROGRESS NOTES
Inpatient Physical Therapy Evaluation & Treatment    Unit: 2 [id number]  Date:  11/18/2023  Patient Name:    Renata Zhao  Admitting diagnosis:  Displacement of lumbar intervertebral disc without myelopathy [M51.26]  Back spasm [M62.830]  Severe low back pain [M54.50]  Spinal stenosis of lumbosacral region [M48.07]  Unable to care for self [Z78.9]  Admit Date:  11/17/2023  Precautions/Restrictions/WB Status/ Lines/ Wounds/ Oxygen: Fall risk, Bed/chair alarm, and Lines (IV and external catheter)      Pt seen for cotreatment this date due to patient safety, patient endurance, complexity of condition, acute illness/injury, and need for the assistance of 2 skilled therapists    Treatment Time:  15:25-16:00  Treatment Number:  1   Timed Code Treatment Minutes: 25 minutes  Total Treatment Minutes:  35  minutes    Patient Stated Goals for Therapy: \" to go to rehab \"          Discharge Recommendations: SNF  DME needs for discharge: Continue to Assess       Therapy recommendation for EMS Transport: requires transport by cot due to increased pain and muscle spasm    Therapy recommendations for staff:   Assist of 1 -2 for stand-pivot transfers with gait belt to/from [place] discretion of RN    History of Present Illness:   Chief Complaint: stated in first sentence of HPI  Renata Zhao is a 80 y.o. female who presents with low back pain and spasms similar to prior hospitalization. She is unable to safely ambulate or attend to ADLs. Will require admission for this reason and modification of pain regimen. Will likely require placemen. CT lumbar spine 11/3/2023:  IMPRESSION:  1. Postoperative changes in the lumbar spine with no complication. 2. No acute fracture or subluxation. 3. Degenerative changes are chronic at L1-2 and L2-3. Home Health S4 Level Recommendation:  NA        AM-PAC Mobility Score    AM-PAC Inpatient Mobility Raw Score : [address] [facility] scored a 10/24 on the AM-PAC short mobility form.  Current research

## 2023-11-19 PROCEDURE — 6360000002 HC RX W HCPCS: Performed by: SURGERY

## 2023-11-19 PROCEDURE — 2580000003 HC RX 258: Performed by: SURGERY

## 2023-11-19 PROCEDURE — 6370000000 HC RX 637 (ALT 250 FOR IP): Performed by: SURGERY

## 2023-11-19 PROCEDURE — 94640 AIRWAY INHALATION TREATMENT: CPT

## 2023-11-19 PROCEDURE — 94761 N-INVAS EAR/PLS OXIMETRY MLT: CPT

## 2023-11-19 PROCEDURE — 99232 SBSQ HOSP IP/OBS MODERATE 35: CPT | Performed by: INTERNAL MEDICINE

## 2023-11-19 PROCEDURE — 6370000000 HC RX 637 (ALT 250 FOR IP): Performed by: INTERNAL MEDICINE

## 2023-11-19 PROCEDURE — 6370000000 HC RX 637 (ALT 250 FOR IP): Performed by: PHYSICIAN ASSISTANT

## 2023-11-19 PROCEDURE — 1200000000 HC SEMI PRIVATE

## 2023-11-19 RX ORDER — POLYETHYLENE GLYCOL 3350 17 G/17G
17 POWDER, FOR SOLUTION ORAL 2 TIMES DAILY
Status: DISCONTINUED | OUTPATIENT
Start: 2023-11-19 | End: 2023-11-21 | Stop reason: HOSPADM

## 2023-11-19 RX ORDER — POLYETHYLENE GLYCOL 3350 17 G/17G
17 POWDER, FOR SOLUTION ORAL 2 TIMES DAILY
Status: DISCONTINUED | OUTPATIENT
Start: 2023-11-19 | End: 2023-11-19

## 2023-11-19 RX ORDER — DOCUSATE SODIUM 100 MG/1
100 CAPSULE, LIQUID FILLED ORAL 2 TIMES DAILY
Status: DISCONTINUED | OUTPATIENT
Start: 2023-11-19 | End: 2023-11-21 | Stop reason: HOSPADM

## 2023-11-19 RX ADMIN — METOPROLOL TARTRATE 25 MG: 25 TABLET, FILM COATED ORAL at 22:08

## 2023-11-19 RX ADMIN — TIZANIDINE 4 MG: 4 TABLET ORAL at 05:57

## 2023-11-19 RX ADMIN — METOPROLOL TARTRATE 25 MG: 25 TABLET, FILM COATED ORAL at 09:29

## 2023-11-19 RX ADMIN — Medication 10 ML: at 22:11

## 2023-11-19 RX ADMIN — TIZANIDINE 4 MG: 4 TABLET ORAL at 12:24

## 2023-11-19 RX ADMIN — TROSPIUM CHLORIDE 20 MG: 20 TABLET, FILM COATED ORAL at 22:08

## 2023-11-19 RX ADMIN — TIZANIDINE 4 MG: 4 TABLET ORAL at 21:54

## 2023-11-19 RX ADMIN — Medication 2 PUFF: at 19:03

## 2023-11-19 RX ADMIN — CARBOXYMETHYLCELLULOSE SODIUM 1 DROP: 10 GEL OPHTHALMIC at 09:30

## 2023-11-19 RX ADMIN — CARBOXYMETHYLCELLULOSE SODIUM 1 DROP: 10 GEL OPHTHALMIC at 22:10

## 2023-11-19 RX ADMIN — DOCUSATE SODIUM 100 MG: 100 CAPSULE, LIQUID FILLED ORAL at 09:29

## 2023-11-19 RX ADMIN — ASPIRIN 81 MG: 81 TABLET, CHEWABLE ORAL at 09:30

## 2023-11-19 RX ADMIN — POLYETHYLENE GLYCOL (3350) 17 G: 17 POWDER, FOR SOLUTION ORAL at 09:31

## 2023-11-19 RX ADMIN — OXYCODONE AND ACETAMINOPHEN 1 TABLET: 5; 325 TABLET ORAL at 16:58

## 2023-11-19 RX ADMIN — ENOXAPARIN SODIUM 40 MG: 100 INJECTION SUBCUTANEOUS at 09:30

## 2023-11-19 RX ADMIN — Medication 10 ML: at 10:23

## 2023-11-19 RX ADMIN — OXYCODONE AND ACETAMINOPHEN 1 TABLET: 5; 325 TABLET ORAL at 02:03

## 2023-11-19 RX ADMIN — DOCUSATE SODIUM 100 MG: 100 CAPSULE, LIQUID FILLED ORAL at 22:08

## 2023-11-19 RX ADMIN — LISINOPRIL 20 MG: 20 TABLET ORAL at 09:29

## 2023-11-19 RX ADMIN — AMLODIPINE BESYLATE 5 MG: 5 TABLET ORAL at 22:08

## 2023-11-19 RX ADMIN — OXYCODONE AND ACETAMINOPHEN 1 TABLET: 5; 325 TABLET ORAL at 09:28

## 2023-11-19 RX ADMIN — ATORVASTATIN CALCIUM 40 MG: 40 TABLET, FILM COATED ORAL at 22:07

## 2023-11-19 RX ADMIN — POLYETHYLENE GLYCOL (3350) 17 G: 17 POWDER, FOR SOLUTION ORAL at 22:07

## 2023-11-19 ASSESSMENT — PAIN SCALES - GENERAL
PAINLEVEL_OUTOF10: 10
PAINLEVEL_OUTOF10: 0
PAINLEVEL_OUTOF10: 9
PAINLEVEL_OUTOF10: 10
PAINLEVEL_OUTOF10: 6
PAINLEVEL_OUTOF10: 8

## 2023-11-19 ASSESSMENT — PAIN DESCRIPTION - DESCRIPTORS
DESCRIPTORS: ACHING;SPASM
DESCRIPTORS: DISCOMFORT;SPASM
DESCRIPTORS: ACHING;SORE;SPASM
DESCRIPTORS: SPASM
DESCRIPTORS: ACHING
DESCRIPTORS: SPASM
DESCRIPTORS: SPASM

## 2023-11-19 ASSESSMENT — PAIN DESCRIPTION - ORIENTATION
ORIENTATION: LOWER
ORIENTATION: LEFT;LOWER
ORIENTATION: LEFT;LOWER
ORIENTATION: LEFT
ORIENTATION: LEFT;LOWER

## 2023-11-19 ASSESSMENT — PAIN DESCRIPTION - LOCATION
LOCATION: BACK

## 2023-11-19 ASSESSMENT — PAIN - FUNCTIONAL ASSESSMENT: PAIN_FUNCTIONAL_ASSESSMENT: PREVENTS OR INTERFERES WITH MANY ACTIVE NOT PASSIVE ACTIVITIES

## 2023-11-19 NOTE — CARE COORDINATION
Case Management Assessment  Initial Evaluation    Date/Time of Evaluation: 11/19/2023 10:17 AM  Assessment Completed by: Derek Anaya RN    If patient is discharged prior to next notation, then this note serves as note for discharge by case management. 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 Yes Olivia Campos MD)    Chart Reviewed: Yes      History Provided by: Patient  Patient Orientation: Alert and Oriented    Patient Cognition: Alert    Hospitalization in the last 30 days (Readmission):  Yes    If yes, Readmission Assessment in  Navigator will be completed.     Advance Directives:      Code Status: Full Code   Patient's Primary Decision Maker is: Legal Next of Kin    Primary Decision MakerDarabeatrice Rakan - Child - [phone]    Secondary Decision Maker: Clara Valerio - Child - [phone]    Supplemental (Other) Decision Maker: Janet Mcfadden - Child - [phone]    Discharge Planning:    Patient lives with: Children Type of Home: House  Primary Care Giver: Self  Patient Support Systems include: Children   Current Financial resources: Medicare  Current community resources: None  Current services prior to admission: Durable Medical Equipment            Current DME: Valetta Age, Wheelchair, Other (Comment) (electric scooter)            Type of Home Care services:  None    ADLS  Prior functional level: Assistance with the following:, Mobility, Cooking, Housework, Shopping (Uses rolliator and scooter)  Current functional level: following treatment goals of Displacement of lumbar intervertebral disc without myelopathy [M51.26]  Back spasm [M62.830]  Severe low back pain [M54.50]  Spinal stenosis of lumbosacral region [M48.07]  Unable to care for self [Z83.9]    IF APPLICABLE: The Patient and/or patient representative Alba Garcia and her family were provided with a choice of provider and agrees with the discharge plan. Freedom of choice list with basic dialogue that supports the patient's individualized plan of care/goals and shares the quality data associated with the providers was provided to:     Patient Representative Name:       The Patient and/or Patient Representative Agree with the Discharge Plan?       Michelle Malloy RN  Case Management Department  Ph: [phone]

## 2023-11-19 NOTE — PLAN OF CARE
Problem: Pain  Goal: Verbalizes/displays adequate comfort level or baseline comfort level  11/19/2023 0245 by Ashlyn Quezada RN  Outcome: Not Progressing     Problem: Discharge Planning  Goal: Discharge to home or other facility with appropriate resources  11/19/2023 0245 by Ashlyn Quezada RN  Outcome: Progressing     Problem: Safety - Adult  Goal: Free from fall injury  11/19/2023 0245 by Ashlyn Quezada RN  Outcome: Progressing     Problem: ABCDS Injury Assessment  Goal: Absence of physical injury  Outcome: Progressing     Problem: Pain  Goal: Verbalizes/displays adequate comfort level or baseline comfort level  11/19/2023 0245 by Ashlyn Quezada RN  Outcome: Not Progressing  11/18/2023 1415 by Yesenia Cortes RN  Outcome: Progressing

## 2023-11-19 NOTE — PLAN OF CARE
Problem: Discharge Planning  Goal: Discharge to home or other facility with appropriate resources  11/19/2023 1058 by Yara Bailey RN  Outcome: Progressing  Flowsheets (Taken 11/19/2023 0900)  Discharge to home or other facility with appropriate resources:   Identify barriers to discharge with patient and caregiver   Arrange for needed discharge resources and transportation as appropriate  11/19/2023 0245 by Eros Rodriguez RN  Outcome: Progressing     Problem: Pain  Goal: Verbalizes/displays adequate comfort level or baseline comfort level  11/19/2023 1058 by Yara Bailey RN  Outcome: Progressing  11/19/2023 0245 by Eros Rodriguez RN  Outcome: Not Progressing     Problem: Safety - Adult  Goal: Free from fall injury  11/19/2023 1058 by Yara Bailey RN  Outcome: Progressing  11/19/2023 0245 by Eros Rodriguez RN  Outcome: Progressing     Problem: Neurosensory - Adult  Goal: Achieves maximal functionality and self care  Outcome: Progressing     Problem: Pain  Goal: Verbalizes/displays adequate comfort level or baseline comfort level  11/19/2023 1058 by Yara Bailey RN  Outcome: Progressing  11/19/2023 0245 by Eros Rodriguez RN  Outcome: Not Progressing

## 2023-11-19 NOTE — PROGRESS NOTES
Shift assessment complete, see flow sheet, schedule medications given, see MAR. Pt VSS. Bed in lowest position, bed alarm activated for pt safety,Call light and bed side table within reach, pt educated on use of call light if needing assist., pt verbalized understanding, denies further questions at this time. Denies any needs at this time, continue to monitor.

## 2023-11-20 ENCOUNTER — APPOINTMENT (OUTPATIENT)
Dept: MRI IMAGING | Age: 88
DRG: 884 | End: 2023-11-20
Payer: MEDICARE

## 2023-11-20 PROCEDURE — 6370000000 HC RX 637 (ALT 250 FOR IP): Performed by: SURGERY

## 2023-11-20 PROCEDURE — 2580000003 HC RX 258: Performed by: SURGERY

## 2023-11-20 PROCEDURE — 6370000000 HC RX 637 (ALT 250 FOR IP): Performed by: INTERNAL MEDICINE

## 2023-11-20 PROCEDURE — 99232 SBSQ HOSP IP/OBS MODERATE 35: CPT | Performed by: INTERNAL MEDICINE

## 2023-11-20 PROCEDURE — 6370000000 HC RX 637 (ALT 250 FOR IP): Performed by: PHYSICIAN ASSISTANT

## 2023-11-20 PROCEDURE — 72148 MRI LUMBAR SPINE W/O DYE: CPT

## 2023-11-20 PROCEDURE — 1200000000 HC SEMI PRIVATE

## 2023-11-20 PROCEDURE — 94761 N-INVAS EAR/PLS OXIMETRY MLT: CPT

## 2023-11-20 PROCEDURE — 6360000002 HC RX W HCPCS: Performed by: SURGERY

## 2023-11-20 PROCEDURE — 94640 AIRWAY INHALATION TREATMENT: CPT

## 2023-11-20 RX ORDER — MORPHINE SULFATE 2 MG/ML
2 INJECTION, SOLUTION INTRAMUSCULAR; INTRAVENOUS
Status: DISCONTINUED | OUTPATIENT
Start: 2023-11-20 | End: 2023-11-21 | Stop reason: HOSPADM

## 2023-11-20 RX ORDER — MORPHINE SULFATE 4 MG/ML
4 INJECTION, SOLUTION INTRAMUSCULAR; INTRAVENOUS
Status: DISCONTINUED | OUTPATIENT
Start: 2023-11-20 | End: 2023-11-21 | Stop reason: HOSPADM

## 2023-11-20 RX ADMIN — POLYETHYLENE GLYCOL (3350) 17 G: 17 POWDER, FOR SOLUTION ORAL at 20:51

## 2023-11-20 RX ADMIN — OXYCODONE AND ACETAMINOPHEN 1 TABLET: 5; 325 TABLET ORAL at 19:43

## 2023-11-20 RX ADMIN — Medication 10 ML: at 09:50

## 2023-11-20 RX ADMIN — ATORVASTATIN CALCIUM 40 MG: 40 TABLET, FILM COATED ORAL at 20:52

## 2023-11-20 RX ADMIN — METOPROLOL TARTRATE 25 MG: 25 TABLET, FILM COATED ORAL at 20:51

## 2023-11-20 RX ADMIN — CARBOXYMETHYLCELLULOSE SODIUM 1 DROP: 10 GEL OPHTHALMIC at 09:40

## 2023-11-20 RX ADMIN — OXYCODONE AND ACETAMINOPHEN 1 TABLET: 5; 325 TABLET ORAL at 00:48

## 2023-11-20 RX ADMIN — MORPHINE SULFATE 4 MG: 4 INJECTION, SOLUTION INTRAMUSCULAR; INTRAVENOUS at 23:22

## 2023-11-20 RX ADMIN — DOCUSATE SODIUM 100 MG: 100 CAPSULE, LIQUID FILLED ORAL at 20:51

## 2023-11-20 RX ADMIN — DOCUSATE SODIUM 100 MG: 100 CAPSULE, LIQUID FILLED ORAL at 09:40

## 2023-11-20 RX ADMIN — LISINOPRIL 20 MG: 20 TABLET ORAL at 09:40

## 2023-11-20 RX ADMIN — CARBOXYMETHYLCELLULOSE SODIUM 1 DROP: 10 GEL OPHTHALMIC at 20:51

## 2023-11-20 RX ADMIN — OXYCODONE AND ACETAMINOPHEN 1 TABLET: 5; 325 TABLET ORAL at 09:40

## 2023-11-20 RX ADMIN — METOPROLOL TARTRATE 25 MG: 25 TABLET, FILM COATED ORAL at 09:40

## 2023-11-20 RX ADMIN — TIZANIDINE 4 MG: 4 TABLET ORAL at 13:48

## 2023-11-20 RX ADMIN — ASPIRIN 81 MG: 81 TABLET, CHEWABLE ORAL at 09:40

## 2023-11-20 RX ADMIN — TIZANIDINE 4 MG: 4 TABLET ORAL at 20:52

## 2023-11-20 RX ADMIN — Medication 2 PUFF: at 19:52

## 2023-11-20 RX ADMIN — FLUTICASONE PROPIONATE 1 SPRAY: 50 SPRAY, METERED NASAL at 09:41

## 2023-11-20 RX ADMIN — TROSPIUM CHLORIDE 20 MG: 20 TABLET, FILM COATED ORAL at 20:51

## 2023-11-20 RX ADMIN — ENOXAPARIN SODIUM 40 MG: 100 INJECTION SUBCUTANEOUS at 09:40

## 2023-11-20 RX ADMIN — MORPHINE SULFATE 4 MG: 4 INJECTION, SOLUTION INTRAMUSCULAR; INTRAVENOUS at 04:29

## 2023-11-20 RX ADMIN — POLYETHYLENE GLYCOL (3350) 17 G: 17 POWDER, FOR SOLUTION ORAL at 09:40

## 2023-11-20 RX ADMIN — TIZANIDINE 4 MG: 4 TABLET ORAL at 03:55

## 2023-11-20 RX ADMIN — Medication 10 ML: at 20:54

## 2023-11-20 RX ADMIN — AMLODIPINE BESYLATE 5 MG: 5 TABLET ORAL at 20:51

## 2023-11-20 ASSESSMENT — PAIN DESCRIPTION - ORIENTATION
ORIENTATION: LEFT
ORIENTATION: LEFT;LOWER
ORIENTATION: LEFT

## 2023-11-20 ASSESSMENT — PAIN SCALES - GENERAL
PAINLEVEL_OUTOF10: 0
PAINLEVEL_OUTOF10: 10
PAINLEVEL_OUTOF10: 10
PAINLEVEL_OUTOF10: 5
PAINLEVEL_OUTOF10: 7
PAINLEVEL_OUTOF10: 8

## 2023-11-20 ASSESSMENT — PAIN DESCRIPTION - LOCATION
LOCATION: BACK

## 2023-11-20 ASSESSMENT — PAIN DESCRIPTION - DESCRIPTORS
DESCRIPTORS: SPASM
DESCRIPTORS: SPASM;DISCOMFORT
DESCRIPTORS: SPASM

## 2023-11-20 ASSESSMENT — PAIN - FUNCTIONAL ASSESSMENT: PAIN_FUNCTIONAL_ASSESSMENT: PREVENTS OR INTERFERES SOME ACTIVE ACTIVITIES AND ADLS

## 2023-11-20 ASSESSMENT — PAIN SCALES - WONG BAKER: WONGBAKER_NUMERICALRESPONSE: 0

## 2023-11-20 NOTE — PLAN OF CARE
Problem: Discharge Planning  Goal: Discharge to home or other facility with appropriate resources  11/20/2023 1158 by Addie Woodruff RN  Outcome: Progressing  11/20/2023 0154 by Palmira Jenkins RN  Outcome: Progressing     Problem: Pain  Goal: Verbalizes/displays adequate comfort level or baseline comfort level  11/20/2023 1158 by Addie Woodruff RN  Outcome: Progressing  11/20/2023 0154 by Palmira Jenkins RN  Outcome: Progressing     Problem: Safety - Adult  Goal: Free from fall injury  11/20/2023 1158 by Addie Woodruff RN  Outcome: Progressing  11/20/2023 0154 by Palmira Jenkins RN  Outcome: Progressing     Problem: ABCDS Injury Assessment  Goal: Absence of physical injury  11/20/2023 1158 by Addie Woodruff RN  Outcome: Progressing  11/20/2023 0154 by Palmira Jenkins RN  Outcome: Progressing     Problem: Skin/Tissue Integrity  Goal: Absence of new skin breakdown  Description: 1. Monitor for areas of redness and/or skin breakdown  2. Assess vascular access sites hourly  3. Every 4-6 hours minimum:  Change oxygen saturation probe site  4. Every 4-6 hours:  If on nasal continuous positive airway pressure, respiratory therapy assess nares and determine need for appliance change or resting period.   11/20/2023 1158 by Addie Woodruff RN  Outcome: Progressing  11/20/2023 0154 by Palmira Jenkins RN  Outcome: Progressing     Problem: Neurosensory - Adult  Goal: Achieves maximal functionality and self care  11/20/2023 1158 by Addie Woodruff RN  Outcome: Progressing  11/20/2023 0154 by Palmira Jenkins RN  Outcome: Progressing

## 2023-11-20 NOTE — CARE COORDINATION
INTERDISCIPLINARY PLAN OF CARE CONFERENCE    Date/Time: 11/20/2023 4:42 PM  Completed by: Oswald Lazar RN, Case Management      Patient Name:  Jose Vences  YOB: 1934  Admitting Diagnosis: Displacement of lumbar intervertebral disc without myelopathy [M51.26]  Back spasm [M62.830]  Severe low back pain [M54.50]  Spinal stenosis of lumbosacral region [M48.07]  Unable to care for self [Z78.9]     Admit Date/Time:  11/17/2023  5:11 PM    Chart reviewed. Interdisciplinary team contacted or reviewed plan related to patient progress and discharge plans. Disciplines included Case Management, Nursing, and Dietitian. Current Status:Status  PT/OT recommendation for discharge plan of care: SNF    Expected D/C Disposition:  Rehab  Confirmed plan with patient  Yes    Met with:patient  Discharge Plan Comments: Reviewed chart and met with pt who would like to go to [facility] [place]. Left Vm for Livier Huerta at [facility] [place] with referral. Await return call. Will follow.     Home O2 in place on admit: No  Pt informed of need to bring portable home O2 tank on day of discharge for nursing to connect prior to leaving:  Not Indicated  Verbalized agreement/Understanding:  Not Indicated

## 2023-11-20 NOTE — PROGRESS NOTES
AM assessment completed. C/o chronic back pain 7/10, percocet given and scheduled lidocaine patch applied. No signs of symptoms of distress noted. Patient tolerated morning medications well. Respirations easy and even. Bed in lowest position, bed alarm in place and functioning properly, SR up x 2 and bed in low position. Call light within reach. Bedside Mobility Assessment Tool (BMAT):     Assessment Level 1- Sit and Shake    1. From a semi-reclined position, ask patient to sit up and rotate to a seated position at the side of the bed. Can use the bedrail. 2. Ask patient to reach out and grab your hand and shake making sure patient reaches across his/her midline. Pass- Patient is able to come to a seated position, maintain core strength. Maintains seated balance while reaching across midline. Move on to Assessment Level 2. Assessment Level 2- Stretch and Point   1. With patient in seated position at the side of the bed, have patient place both feet on the floor (or stool) with knees no higher than hips. 2. Ask patient to stretch one leg and straighten the knee, then bend the ankle/flex and point the toes. If appropriate, repeat with the other leg. Pass- Patient is able to demonstrate appropriate quad strength on intended weight bearing limb(s). Move onto Assessment Level 3. Assessment Level 3- Stand   1. Ask patient to elevate off the bed or chair (seated to standing) using an assistive device (cane, bedrail). 2. Patient should be able to raise buttocks off be and hold for a count of five. May repeat once. Pass- Patient maintains standing stability for at least 5 seconds, proceed to assessment level 4. Assessment Level 4- Walk   1. Ask patient to march in place at bedside. 2. Then ask patient to advance step and return each foot. Some medical conditions may render a patient from stepping backwards, use your best clinical judgement.    Fail- Patient not able to complete tasks OR

## 2023-11-20 NOTE — PLAN OF CARE
Problem: Discharge Planning  Goal: Discharge to home or other facility with appropriate resources  Outcome: Progressing     Problem: Pain  Goal: Verbalizes/displays adequate comfort level or baseline comfort level  Outcome: Progressing     Problem: Safety - Adult  Goal: Free from fall injury  Outcome: Progressing     Problem: ABCDS Injury Assessment  Goal: Absence of physical injury  Outcome: Progressing     Problem: Skin/Tissue Integrity  Goal: Absence of new skin breakdown  Description: 1. Monitor for areas of redness and/or skin breakdown  2. Assess vascular access sites hourly  3. Every 4-6 hours minimum:  Change oxygen saturation probe site  4. Every 4-6 hours:  If on nasal continuous positive airway pressure, respiratory therapy assess nares and determine need for appliance change or resting period.   Outcome: Progressing     Problem: Neurosensory - Adult  Goal: Achieves maximal functionality and self care  Outcome: Progressing

## 2023-11-21 VITALS
RESPIRATION RATE: 16 BRPM | WEIGHT: 176.3 LBS | BODY MASS INDEX: 28.33 KG/M2 | HEART RATE: 78 BPM | DIASTOLIC BLOOD PRESSURE: 67 MMHG | HEIGHT: 66 IN | OXYGEN SATURATION: 97 % | TEMPERATURE: 97.8 F | SYSTOLIC BLOOD PRESSURE: 167 MMHG

## 2023-11-21 PROCEDURE — 99232 SBSQ HOSP IP/OBS MODERATE 35: CPT | Performed by: INTERNAL MEDICINE

## 2023-11-21 PROCEDURE — 6360000002 HC RX W HCPCS: Performed by: SURGERY

## 2023-11-21 PROCEDURE — 94761 N-INVAS EAR/PLS OXIMETRY MLT: CPT

## 2023-11-21 PROCEDURE — 97116 GAIT TRAINING THERAPY: CPT

## 2023-11-21 PROCEDURE — 6370000000 HC RX 637 (ALT 250 FOR IP): Performed by: INTERNAL MEDICINE

## 2023-11-21 PROCEDURE — 6370000000 HC RX 637 (ALT 250 FOR IP): Performed by: SURGERY

## 2023-11-21 PROCEDURE — 97535 SELF CARE MNGMENT TRAINING: CPT

## 2023-11-21 PROCEDURE — 2580000003 HC RX 258: Performed by: SURGERY

## 2023-11-21 PROCEDURE — 6370000000 HC RX 637 (ALT 250 FOR IP): Performed by: PHYSICIAN ASSISTANT

## 2023-11-21 PROCEDURE — 97530 THERAPEUTIC ACTIVITIES: CPT

## 2023-11-21 RX ORDER — OXYCODONE HYDROCHLORIDE AND ACETAMINOPHEN 5; 325 MG/1; MG/1
1 TABLET ORAL EVERY 6 HOURS PRN
Qty: 12 TABLET | Refills: 0 | Status: SHIPPED | OUTPATIENT
Start: 2023-11-21 | End: 2023-11-24

## 2023-11-21 RX ORDER — TIZANIDINE 4 MG/1
4 TABLET ORAL EVERY 6 HOURS PRN
Qty: 12 TABLET | Refills: 0 | Status: SHIPPED | OUTPATIENT
Start: 2023-11-21 | End: 2023-11-24

## 2023-11-21 RX ADMIN — Medication 10 ML: at 09:09

## 2023-11-21 RX ADMIN — ASPIRIN 81 MG: 81 TABLET, CHEWABLE ORAL at 08:51

## 2023-11-21 RX ADMIN — FLUTICASONE PROPIONATE 1 SPRAY: 50 SPRAY, METERED NASAL at 08:56

## 2023-11-21 RX ADMIN — OXYCODONE AND ACETAMINOPHEN 1 TABLET: 5; 325 TABLET ORAL at 16:08

## 2023-11-21 RX ADMIN — TIZANIDINE 4 MG: 4 TABLET ORAL at 16:08

## 2023-11-21 RX ADMIN — OXYCODONE AND ACETAMINOPHEN 1 TABLET: 5; 325 TABLET ORAL at 03:18

## 2023-11-21 RX ADMIN — ENOXAPARIN SODIUM 40 MG: 100 INJECTION SUBCUTANEOUS at 08:51

## 2023-11-21 RX ADMIN — DOCUSATE SODIUM 100 MG: 100 CAPSULE, LIQUID FILLED ORAL at 08:51

## 2023-11-21 RX ADMIN — TIZANIDINE 4 MG: 4 TABLET ORAL at 03:18

## 2023-11-21 RX ADMIN — POLYETHYLENE GLYCOL (3350) 17 G: 17 POWDER, FOR SOLUTION ORAL at 08:51

## 2023-11-21 RX ADMIN — LISINOPRIL 20 MG: 20 TABLET ORAL at 08:51

## 2023-11-21 RX ADMIN — CARBOXYMETHYLCELLULOSE SODIUM 1 DROP: 10 GEL OPHTHALMIC at 08:51

## 2023-11-21 RX ADMIN — METOPROLOL TARTRATE 25 MG: 25 TABLET, FILM COATED ORAL at 08:51

## 2023-11-21 RX ADMIN — ACETAMINOPHEN 650 MG: 325 TABLET ORAL at 08:55

## 2023-11-21 ASSESSMENT — PAIN DESCRIPTION - ONSET: ONSET: SUDDEN

## 2023-11-21 ASSESSMENT — PAIN SCALES - GENERAL
PAINLEVEL_OUTOF10: 8

## 2023-11-21 ASSESSMENT — PAIN DESCRIPTION - FREQUENCY: FREQUENCY: INTERMITTENT

## 2023-11-21 NOTE — CARE COORDINATION
DISCHARGE ORDER  Date/Time 2023 3:51 PM  Completed by: Brianda Westfall RN, Case Management    Patient Name: Milla Hutchinson    : 1934      Admit order Date and Status:19 inpt  Noted discharge order. (verify MD's last order for status of admission/Traditional Medicare 3 [place] Inpatient qualifying stay required for SNF)    Confirmed discharge plan with:              Patient:  Yes              When pt confirms DC plan does any support person need to be contacted by CM No                      Discharge to Facility: [id number] phone number for staff giving report: [phone]   Pre-certification completed: Yes   Hospital Exemption Notification (HENS) completed: Yes   Discharge orders and Continuity of Care faxed to facility:  facility will pull from [facility]      Transportation:               Medical Transport explained with choice list offered to pt/family. Choice:(no preference)  Agency used: ***   time:   17:30      Pt/family/Nursing/Facility aware of  time:   Yes Names: Gretchen Ríos charge, [address], Pt, Marquis Collado at [facility]  Ambulance form completed:  Roundtrip-yes:      Date Last IMM Given: 23    Comments:Order for dc noted. Spoke with pt who cont ashish to go to [facility] [place] at dc. Spoke with Marquis Collado at [facility] [place] who cabn accept at dv. Pre cert is completed. Pt will notify her family of dc. Chart reviewed and no other dc needs identified. Pt [facility]. Pt's O2 sats are 97% on RA. Discharge timeout done with nsg, CM and pt. All discharge needs and concerns addressed. Discharging nurse to complete MOISES, reconcile AVS, and place final copy with patient's discharge packet. Discharging RN to ensure that written prescriptions for  Level II medications are sent with patient to the facility as per protocol.

## 2023-11-21 NOTE — PROGRESS NOTES
Pt given written and verbal discharge instructions. Pt indicated understanding of home medication and care instructions. Prescriptions provided to pt via preferred pharmacy. Pt's belongings packed. IV removed, pt tolerated. No needs voiced.

## 2023-11-21 NOTE — PROGRESS NOTES
AM assessment completed. Pain 8/10, percocet not available at this time, tylenol given; pt satisfied with this coverage at this time. No signs of symptoms of distress noted. Patient tolerated morning medications well. Respirations easy and even. Bed in lowest position, bed alarm in place and functioning properly, SR up x 2 and bed in low position. Call light within reach. Bedside Mobility Assessment Tool (BMAT):     Assessment Level 1- Sit and Shake    1. From a semi-reclined position, ask patient to sit up and rotate to a seated position at the side of the bed. Can use the bedrail. 2. Ask patient to reach out and grab your hand and shake making sure patient reaches across his/her midline. Pass- Patient is able to come to a seated position, maintain core strength. Maintains seated balance while reaching across midline. Move on to Assessment Level 2. Assessment Level 2- Stretch and Point   1. With patient in seated position at the side of the bed, have patient place both feet on the floor (or stool) with knees no higher than hips. 2. Ask patient to stretch one leg and straighten the knee, then bend the ankle/flex and point the toes. If appropriate, repeat with the other leg. Pass- Patient is able to demonstrate appropriate quad strength on intended weight bearing limb(s). Move onto Assessment Level 3. Assessment Level 3- Stand   1. Ask patient to elevate off the bed or chair (seated to standing) using an assistive device (cane, bedrail). 2. Patient should be able to raise buttocks off be and hold for a count of five. May repeat once. Pass- Patient maintains standing stability for at least 5 seconds, proceed to assessment level 4. Assessment Level 4- Walk   1. Ask patient to march in place at bedside. 2. Then ask patient to advance step and return each foot. Some medical conditions may render a patient from stepping backwards, use your best clinical judgement.    Fail- Patient not able

## 2023-11-21 NOTE — PROGRESS NOTES
4 Eyes Skin Assessment     NAME:  Daya Rivera  YOB: 1934  MEDICAL RECORD NUMBER:  [id number]    The patient is being assessed for  Other discharge to SNF    I agree that at least one RN has performed a thorough Head to Toe Skin Assessment on the patient. ALL assessment sites listed below have been assessed. Areas assessed by both nurses:    Head, Face, Ears, Shoulders, Back, Chest, Arms, Elbows, Hands, Sacrum. Buttock, Coccyx, Ischium, Legs. Feet and Heels, and Under Medical Devices         Does the Patient have a Wound? No noted wound(s)       Tl Prevention initiated by RN: No  Wound Care Orders initiated by RN: No    Pressure Injury (Stage 3,4, Unstageable, DTI, NWPT, and Complex wounds) if present, place Wound referral order by RN under : No    New Ostomies, if present place, Ostomy referral order under : No     Nurse 1 eSignature: Electronically signed by Addie Woodruff RN on 11/21/23 at 4:04 PM EST    **SHARE this note so that the co-signing nurse can place an eSignature**    No redness or skin breakdown noted. Scattered bruising to BUE.     Nurse 2 eSignature: {Esignature:[id number]}

## 2023-11-21 NOTE — PROGRESS NOTES
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]  Back spasm [M62.830]  Severe low back pain [M54.50]  Spinal stenosis of lumbosacral region [M48.07]  Unable to care for self [Z78.9]  Admit Date:  11/17/2023  Precautions/Restrictions/WB Status/ Lines/ Wounds/ Oxygen: Fall risk and Bed/chair alarm      Pt seen for cotreatment this date due to patient safety, patient endurance, complexity of condition, acute illness/injury, and need for the assistance of 2 skilled therapists    Treatment Time:  5590-5104  Treatment Number:  2   Timed Code Treatment Minutes:  26 minutes  Total Treatment Minutes:  26 minutes    Patient Stated Goals for Therapy: \" to go to rehab \"          Discharge Recommendations: SNF  DME needs for discharge: Continue to Assess       Therapy recommendation for EMS Transport: can transport by wheelchair    Therapy recommendations for staff:   Assist of 1 -2 for stand-pivot transfers with gait belt to/from [place] discretion of RN    History of Present Illness:   Chief Complaint: stated in first sentence of HPI  Bindu Quiñones is a 80 y.o. female who presents with low back pain and spasms similar to prior hospitalization. She is unable to safely ambulate or attend to ADLs. Will require admission for this reason and modification of pain regimen. Will likely require placemen. CT lumbar spine 11/3/2023:  IMPRESSION:  1. Postoperative changes in the lumbar spine with no complication. 2. No acute fracture or subluxation. 3. Degenerative changes are chronic at L1-2 and L2-3. Home Health S4 Level Recommendation:  NA        AM-PAC Mobility Score    AM-PAC Inpatient Mobility Raw Score : [address] scored a 14/24 on the AM-PAC short mobility form.  Current research shows that an AM-PAC score of 17 or less is typically not associated with a discharge chair, alarm set, positioned in proper neutral alignment and pressure relief provided. Call light provided and all needs within reach  RN aware of pt position/status      Other Activities  None. Patient/Family Education   Pt educated on role of inpatient PT, POC, importance of continued activity, DC recommendations, safety awareness, transfer techniques, and calling for assist with mobility. Assessment  Pt seen today for physical therapy Treatment. Pt demonstrated decreased Activity tolerance, ROM, Safety, and Strength as well as decreased independence with Ambulation, Bed Mobility , and Transfers. Assessment was limited by pain, however patient would be unable to care for herself or progress activity if returning to [facility]. Pt able to perform transfers this date with min A and ambulation up to 15 ft with RW and min A but continues to be severely limited by pain and would not be safe to return home at this time. Continue to recommend SNF for continued therapy services at d/. Recommending SNF upon discharge as patient functioning well below baseline, demonstrates good rehab potential and unable to return home due to limited or no family support, inability to negotiate stairs to enter home/bedroom/bathroom, burden of care beyond caregiver ability, and home environment not conducive to patient recovery. Goals : To be met in 3 visits:  1). Independent with LE Ex x 10 reps  2). Sit to/from stand: Mod A   3). Bed to chair: Mod A   4). CHF goal: N/A    To be met in 6 visits:  1). Supine to/from sit: SBA  2). Sit to/from stand: SBA  3). Bed to chair: SBA  4). Gait: Ambulate 50 ft.  with CGA and use of Rollator  5). Tolerate B LE exercises 3 sets of 10-15 reps  6).   Ascend/descend 1 steps with CGA with use of no handrail and LRAD (least restrictive assistive device)    Rehabilitation Potential: Good  Strengths for achieving goals include:   Pt motivated, PLOF, Family Support, and Pt cooperative

## 2023-11-21 NOTE — CARE COORDINATION
Received call back from Aden at [facility] [place] who states can accept for rehab. [facility] CM will start pre cert. Will cont to follow.

## 2023-11-21 NOTE — PROGRESS NOTES
Transferred care to [facility], [address] [place] [address] Street. Face to face bedside report given, no need voiced at this time.

## 2023-11-21 NOTE — PROGRESS NOTES
Inpatient Occupational Therapy Treatment    Unit: 2 [id number]  Date:  11/21/2023  Patient Name:    Milla Hutchinson  Admitting diagnosis:  Displacement of lumbar intervertebral disc without myelopathy [M51.26]  Back spasm [M62.830]  Severe low back pain [M54.50]  Spinal stenosis of lumbosacral region [M48.07]  Unable to care for self [Z78.9]  Admit Date:  11/17/2023  Precautions/Restrictions/WB Status/ Lines/ Wounds/ Oxygen: Fall risk, Bed/chair alarm, and Lines (IV and external catheter)    Pt seen for cotreatment this date due to patient safety, patient endurance, complexity of condition, acute illness/injury, and need for the assistance of 2 skilled therapists    Treatment Time:  940-1010  Treatment Number:  2  Timed Code Treatment Minutes: 30 minutes  Total Treatment Minutes:  30 minutes    Patient Goals for Therapy: \"go to rehab\"          Discharge Recommendations: SNF  DME needs for discharge: Defer to facility       Therapy recommendations for staff:   Assist of 1 for transfers with use of rolling walker (RW) and gait belt to/from [facility]  to/from [facility]    History of Present Illness:   Per Dr Awilda Marcos H&P 11/17/23:  \"Rosie Taylor is a 80 y.o. female who presents with low back pain and spasms similar to prior hospitalization. She is unable to safely ambulate or attend to ADLs. Will require admission for this reason and modification of pain regimen. Will likely require placemen. \"     CT lumbar spine 11/3/2023:  IMPRESSION:  1. Postoperative changes in the lumbar spine with no complication. 2. No acute fracture or subluxation. 3. Degenerative changes are chronic at L1-2 and L2-3. Home Health S4 Level Recommendation:  NA    AM-PAC Score: AM-PAC Inpatient Daily Activity Raw Score: 16     Subjective:  Patient sitting up in chair with no family present. Requesting to use restroom. Pt agreeable to this OT session. Cognition:    A&O orientation not directly assessed.     Able to follow 2 step commands    Pain: Yes  Location: low back  Ratin /10  Pain Medicine Status: Received pain med prior to tx     Preadmission Environment:   Pt lives with                                         with family (dtr Veronica Fajardo - works during the day) and  Assist not Available  Home environment:                            one story home and pt able to stay main level  Steps to enter first floor:                     Small threshold steps to enter  Steps to second floor/basement:        Full flight of 12-13 (pt does not go to the basement.  Veronica Fajardo lives in the basement)  Laundry:                                              Basement  Bathroom:                                           tub/shower unit, hand held shower head, grab bars in shower, tub transfer bench, grab bars around toilet, and comfort height toilet  Pt sleeps in a:                                     Flat bed  Equipment owned:                              rollator, SPC, manual WC, electric scooter, shower chair/bench, and lift chair, chair lift down basement stairs      Preadmission Status:  Pt able to drive: No  Pt fully independent with ADLs: Yes  Pt receives assistance from family for: Cleaning, Cooking, Laundry , and grocery shopping  Pt independent for functional transfers and utilized Rollator for mobility in home and Rollator out in community  Reports that she has been using the scooter in the home lately  History of falls:            Yes - prior to last admission 2023  [address] home PT briefly after last admission    Balance:  Sitting: SBA  Standing: CGA with RW, gait belt    Bed Mobility:   Supine to Sit:    Not Tested  Sit to Supine:   Not Tested  Rolling:   Not Tested  Scooting in sitting: SBA  Scooting in supine:  Not Tested    Transfers:     Sit to stand:    CGA and With cues for hand placement  Stand to sit:    Min A , With increased time, and With cues for hand placement  Bed to chair:     Not Tested  Bed/ chair to standard toilet:  Not

## 2023-11-21 NOTE — FLOWSHEET NOTE
11/21/23 1614   AVS Reviewed   AVS & discharge instructions reviewed with patient and/or representative? No (comment)       Discharge to SNF. AVS sent with transporter to go with pt to SNF.

## 2023-11-21 NOTE — PLAN OF CARE
Problem: Discharge Planning  Goal: Discharge to home or other facility with appropriate resources  11/21/2023 1550 by Marlin Virk RN  Outcome: Adequate for Discharge     Problem: Pain  Goal: Verbalizes/displays adequate comfort level or baseline comfort level  11/21/2023 1550 by Marlin Virk RN  Outcome: Adequate for Discharge     Problem: Safety - Adult  Goal: Free from fall injury  11/21/2023 1550 by Marlin Virk RN  Outcome: Adequate for Discharge     Problem: ABCDS Injury Assessment  Goal: Absence of physical injury  11/21/2023 1550 by Marlin Virk RN  Outcome: Adequate for Discharge     Problem: Skin/Tissue Integrity  Goal: Absence of new skin breakdown  Description: 1. Monitor for areas of redness and/or skin breakdown  2. Assess vascular access sites hourly  3. Every 4-6 hours minimum:  Change oxygen saturation probe site  4. Every 4-6 hours:  If on nasal continuous positive airway pressure, respiratory therapy assess nares and determine need for appliance change or resting period.   11/21/2023 1550 by Marlin Virk RN  Outcome: Adequate for Discharge     Problem: Neurosensory - Adult  Goal: Achieves maximal functionality and self care  11/21/2023 1550 by Marlin Virk RN  Outcome: Adequate for Discharge

## 2023-11-21 NOTE — PROGRESS NOTES
Called [facility] [place] and report given to Pfenex over the phone. All questions answered.

## 2023-11-22 ENCOUNTER — TELEPHONE (OUTPATIENT)
Dept: FAMILY MEDICINE CLINIC | Age: 88
End: 2023-11-22

## 2023-11-22 NOTE — PROGRESS NOTES
Physician Progress Note      PATIENT:               Nicol Cannon  CSN #:                  [id number]  :                       1934  ADMIT DATE:       2023 5:11 PM  [address] DATE:        2023 5:40 PM  RESPONDING  PROVIDER #:        Kaykay Garner MD          QUERY TEXT:    Pt admitted with back pain and noted \"will need SNF\" per attending note . If possible, please document in the progress notes and discharge summary if   you are evaluating and / or treating any of the following: The medical record reflects the following:  Risk Factors: spinal stenosis, muscle spasm, no 24 hr assistance in home with   steps  Clinical Indicators: MRI- spinal stenosis  Per attending note - \"will need SNF\"  Per PT note -\"Assessment was limited by pain, however patient would be   unable to care for herself or progress activity if returning to [address] [place] [address]. Pt able   to perform transfers this date with min A and ambulation up to 15 ft with RW   and min A but continues to be severely limited by pain and would not be safe   to return home at this time. Continue to recommend SNF for continued therapy   services at d/c.\"  Per OT note-\"Pt limited by pain and fatigue. Pt functioning below baseline and   will likely benefit from continued skilled occupational therapy services to   maximize safety and independence\"  Treatment: MRI, PT/OT for therapy and SHERIE alvarez for assistance for SNF rehab    Thank you,  Christina Ricci RN BSN CRCR ODALIS  [email or web address]  Options provided:  -- Age Related Physical Debility  -- Frailty  -- Other - I will add my own diagnosis  -- Disagree - Not applicable / Not valid  -- Disagree - Clinically unable to determine / Unknown  -- Refer to Clinical Documentation Reviewer    PROVIDER RESPONSE TEXT:    This patient has age related physical debility.     Query created by: Delfino Flores on 2023 2:06 PM      Electronically signed by:  Kaykay Garner MD 2023

## 2023-11-22 NOTE — TELEPHONE ENCOUNTER
Care Transitions Initial Follow Up Call    Outreach made within 2 business days of discharge: Yes    Patient: Verna Cash Patient : 1934   MRN: [id number]  Reason for Admission: There are no discharge diagnoses documented for the most recent discharge. Discharge Date: 23       Spoke with:  Rosie Patient admitted to [address], fax sent for information request.  Will  call and schedule Hospital Follow Up Appointment when the patient is discharged from SNF    Discharge department/facility: [facility]    Scheduled appointment with PCP within 7-14 days    Follow Up  Future Appointments   Date Time Provider [address]   2023  2:30 PM Kaila Knowles MD [address] Marky Moody LPN

## 2023-12-07 ENCOUNTER — TELEPHONE (OUTPATIENT)
Dept: FAMILY MEDICINE CLINIC | Age: 88
End: 2023-12-07

## 2023-12-07 ENCOUNTER — OFFICE VISIT (OUTPATIENT)
Dept: FAMILY MEDICINE CLINIC | Age: 88
End: 2023-12-07
Payer: MEDICARE

## 2023-12-07 VITALS
HEART RATE: 63 BPM | WEIGHT: 176 LBS | SYSTOLIC BLOOD PRESSURE: 142 MMHG | OXYGEN SATURATION: 98 % | DIASTOLIC BLOOD PRESSURE: 80 MMHG | BODY MASS INDEX: 28.41 KG/M2

## 2023-12-07 DIAGNOSIS — K59.09 CHRONIC CONSTIPATION: ICD-10-CM

## 2023-12-07 DIAGNOSIS — Z23 NEED FOR INFLUENZA VACCINATION: ICD-10-CM

## 2023-12-07 DIAGNOSIS — M51.37 DEGENERATION OF LUMBAR OR LUMBOSACRAL INTERVERTEBRAL DISC: Chronic | ICD-10-CM

## 2023-12-07 DIAGNOSIS — I10 PRIMARY HYPERTENSION: ICD-10-CM

## 2023-12-07 DIAGNOSIS — E83.52 HYPERCALCEMIA: ICD-10-CM

## 2023-12-07 DIAGNOSIS — R60.0 BILATERAL LOWER EXTREMITY EDEMA: ICD-10-CM

## 2023-12-07 DIAGNOSIS — S39.012A LUMBOSACRAL STRAIN, INITIAL ENCOUNTER: ICD-10-CM

## 2023-12-07 DIAGNOSIS — M54.50 SEVERE LOW BACK PAIN: ICD-10-CM

## 2023-12-07 DIAGNOSIS — Z09 HOSPITAL DISCHARGE FOLLOW-UP: Primary | ICD-10-CM

## 2023-12-07 DIAGNOSIS — E87.1 HYPONATREMIA: ICD-10-CM

## 2023-12-07 DIAGNOSIS — M48.062 LUMBAR STENOSIS WITH NEUROGENIC CLAUDICATION: ICD-10-CM

## 2023-12-07 PROCEDURE — G8427 DOCREV CUR MEDS BY ELIG CLIN: HCPCS | Performed by: FAMILY MEDICINE

## 2023-12-07 PROCEDURE — G0008 ADMIN INFLUENZA VIRUS VAC: HCPCS | Performed by: FAMILY MEDICINE

## 2023-12-07 PROCEDURE — 1111F DSCHRG MED/CURRENT MED MERGE: CPT | Performed by: FAMILY MEDICINE

## 2023-12-07 PROCEDURE — 1123F ACP DISCUSS/DSCN MKR DOCD: CPT | Performed by: FAMILY MEDICINE

## 2023-12-07 PROCEDURE — 90694 VACC AIIV4 NO PRSRV 0.5ML IM: CPT | Performed by: FAMILY MEDICINE

## 2023-12-07 PROCEDURE — G8484 FLU IMMUNIZE NO ADMIN: HCPCS | Performed by: FAMILY MEDICINE

## 2023-12-07 PROCEDURE — G8417 CALC BMI ABV UP PARAM F/U: HCPCS | Performed by: FAMILY MEDICINE

## 2023-12-07 PROCEDURE — 99215 OFFICE O/P EST HI 40 MIN: CPT | Performed by: FAMILY MEDICINE

## 2023-12-07 PROCEDURE — 1090F PRES/ABSN URINE INCON ASSESS: CPT | Performed by: FAMILY MEDICINE

## 2023-12-07 PROCEDURE — 1036F TOBACCO NON-USER: CPT | Performed by: FAMILY MEDICINE

## 2023-12-07 RX ORDER — LISINOPRIL 40 MG/1
40 TABLET ORAL DAILY
Qty: 30 TABLET | Refills: 0 | Status: SHIPPED | OUTPATIENT
Start: 2023-12-07

## 2023-12-07 RX ORDER — BLOOD PRESSURE TEST KIT
1 KIT MISCELLANEOUS ONCE
Qty: 1 KIT | Refills: 0 | Status: SHIPPED | OUTPATIENT
Start: 2023-12-07 | End: 2023-12-07

## 2023-12-07 NOTE — TELEPHONE ENCOUNTER
I will be seeing her today and can clarify medications, but I believe she was taken off of it. I will sign orders.

## 2023-12-07 NOTE — TELEPHONE ENCOUNTER
Rick An called with [facility] needing to know if Dr. Claribel Felipe will sign orders for PT/ OT. Patient was just discharged from [address] [place] [address]. Rick An wanted to clarify patients medication of lisinopril- hydrochloride 20-25mg. Pt was not sent home with the medication from the care center and evie wanted to clarify she is suppose to be on this medication.  If any questions please call evie at [phone]

## 2023-12-07 NOTE — TELEPHONE ENCOUNTER
Patients daughter calling because patient saw Dr. Mckeon today at 230 and when they got patient AVS, and it stated for patient to stop taking miralax and her oxycodone. Patients daughter stated that patient needs to take both of these medications and is asking if it could be fixed in patients chart that she should still be taking these medications. Please Advise

## 2023-12-07 NOTE — TELEPHONE ENCOUNTER
My apologies. I thought they were just going to do ducolax every other day. I also did not know she was given oxycodone, so will add that back as active.

## 2023-12-08 NOTE — TELEPHONE ENCOUNTER
Patients daughter informed   She said she has been doing oxycodone for over 6 years, its a long term medication   She is doing miralax every day and the Doculax every other day

## 2023-12-12 PROBLEM — K59.09 CHRONIC CONSTIPATION: Status: ACTIVE | Noted: 2023-12-12

## 2023-12-12 NOTE — PROGRESS NOTES
Post-Discharge Transitional Care Follow Up      Pita Bangura   YOB: 1934    Date of Office Visit:  12/7/2023  Date of Hospital Admission: 11/17/23  Date of Hospital Discharge: 11/21/23  Readmission Risk Score (high >=14%. Medium >=10%):Readmission Risk Score: 14.1      Care management risk score Rising risk (score 2-5) and Complex Care (Scores >=6): No Risk Score On File     Non face to face  following discharge, date last encounter closed (first attempt may have been earlier): *No documented post hospital discharge outreach found in the last 14 days     Call initiated 2 business days of discharge: *No response recorded in the last 14 days     Severe low back pain  Gradually improving. Continue PT/OT. Continue with pain management. Lumbar stenosis with neurogenic claudication  Chronic. Pain is gradually improving. Continue PT/OT. Continue with pain management. Degeneration of lumbar or lumbosacral intervertebral disc  Chronic. Pain is gradually improving. Continue PT/OT. Continue with pain management. Lumbosacral strain, initial encounter  Improving. Will continue muscle relaxer and PT/OT. Primary hypertension  -     lisinopril (PRINIVIL;ZESTRIL) 40 MG tablet; Take 1 tablet by mouth daily, Disp-30 tablet, R-0Normal  -     Blood Pressure KIT; ONCE Starting u 12/7/2023, For 1 dose, Disp-1 kit, R-0, Normal  -     MyChart Blood Pressure Flowsheet  Uncontrolled today, also now with ankle edema. Discussed this can be side effect of amlodipine. Given her hyponatremia and hypocalcemia with HCTZ, will not restart that. Will d/c amlodipine and increase plain lisinopril to see if that helps control BP without causing swelling. Family will get home BP cuff and check daily there. BP flowsheet initiated to help keep track of home measurements so adjustments to medications can be made prior to next appointment. Hyponatremia  Found in the hospital. This resolved with d/c of HCTZ.  Will keep her off of

## 2023-12-21 PROBLEM — M54.16 LUMBAR RADICULOPATHY: Status: ACTIVE | Noted: 2023-12-21

## 2024-01-10 ENCOUNTER — TELEPHONE (OUTPATIENT)
Dept: FAMILY MEDICINE CLINIC | Age: 89
End: 2024-01-10

## 2024-01-10 NOTE — TELEPHONE ENCOUNTER
[facility] home care PT called stating the patient has been discharged from PT, has met all goals. FYI

## 2024-01-15 NOTE — TELEPHONE ENCOUNTER
Refill Request     CONFIRM preferred pharmacy with the patient.    If Mail Order Rx - Pend for 90 day refill.      Last Seen: Last Seen Department: 12/18/2023  Last Seen by PCP: 12/7/2023    Last Written: 4/27/21 3 inhalers 3 refills     If no future appointment scheduled:  Review the last OV with PCP and review information for follow-up visit,  Route STAFF MESSAGE with patient name to the  Pool for scheduling with the following information:            -  Timing of next visit           -  Visit type ie Physical, OV, etc           -  Diagnoses/Reason ie. COPD, HTN - Do not use MEDICATION, Follow-up or CHECK UP - Give reason for visit      Next Appointment:   Future Appointments   Date Time Provider Department Center   3/8/2024  3:50 PM Garrett Grullon MD [facility]       Message sent to  to schedule appt with patient?  NO      Requested Prescriptions     Pending Prescriptions Disp Refills    albuterol sulfate (PROAIR RESPICLICK) 108 (90 Base) MCG/ACT aerosol powder inhalation 3 each 3     Sig: Inhale 1-2 puffs into the lungs every 4 hours as needed for Wheezing or Shortness of Breath

## 2024-01-19 ENCOUNTER — TELEPHONE (OUTPATIENT)
Dept: FAMILY MEDICINE CLINIC | Age: 89
End: 2024-01-19

## 2024-01-19 NOTE — TELEPHONE ENCOUNTER
Paola from [facility] is calling letting you know that there is a plan of care order in the portal that needs signed if you could that it would appreciated.

## 2024-01-22 DIAGNOSIS — I10 PRIMARY HYPERTENSION: ICD-10-CM

## 2024-01-22 RX ORDER — LISINOPRIL 40 MG/1
40 TABLET ORAL DAILY
Qty: 90 TABLET | Refills: 1 | Status: SHIPPED | OUTPATIENT
Start: 2024-01-22

## 2024-01-22 NOTE — TELEPHONE ENCOUNTER
Refill Request     CONFIRM preferred pharmacy with the patient.    If Mail Order Rx - Pend for 90 day refill.      Last Seen: Last Seen Department: 12/18/2023  Last Seen by PCP: 12/7/2023    Last Written: 12/07/2023, #30, 0 refills    If no future appointment scheduled:  Review the last OV with PCP and review information for follow-up visit,  Route STAFF MESSAGE with patient name to the  Pool for scheduling with the following information:            -  Timing of next visit           -  Visit type ie Physical, OV, etc           -  Diagnoses/Reason ie. COPD, HTN - Do not use MEDICATION, Follow-up or CHECK UP - Give reason for visit      Next Appointment:   Future Appointments   Date Time Provider Department Center   3/8/2024  3:50 PM Garrett Grullon MD [facility]       Message sent to  to schedule appt with patient?  N/A      Requested Prescriptions     Pending Prescriptions Disp Refills    lisinopril (PRINIVIL;ZESTRIL) 40 MG tablet [Pharmacy Med Name: LISINOPRIL 40MG TABLETS] 90 tablet      Sig: TAKE 1 TABLET BY MOUTH DAILY

## 2024-01-30 ENCOUNTER — TELEPHONE (OUTPATIENT)
Dept: FAMILY MEDICINE CLINIC | Age: 89
End: 2024-01-30

## 2024-01-30 RX ORDER — ALBUTEROL SULFATE 90 UG/1
1-2 AEROSOL, METERED RESPIRATORY (INHALATION) EVERY 4 HOURS PRN
Qty: 54 G | Refills: 1 | Status: SHIPPED | OUTPATIENT
Start: 2024-01-30

## 2024-01-30 NOTE — TELEPHONE ENCOUNTER
Patient called in stating that the pharmacy needs a new script for an alternative inhaler due to them not having the pro-air that was sent. This is $99 at [facility] as well and she can not afford that. Please advise

## 2024-02-06 DIAGNOSIS — E78.5 HYPERLIPIDEMIA, UNSPECIFIED HYPERLIPIDEMIA TYPE: ICD-10-CM

## 2024-02-06 DIAGNOSIS — I10 ESSENTIAL HYPERTENSION: ICD-10-CM

## 2024-02-06 DIAGNOSIS — I10 PRIMARY HYPERTENSION: ICD-10-CM

## 2024-02-06 NOTE — TELEPHONE ENCOUNTER
Refill Request     CONFIRM preferred pharmacy with the patient.    If Mail Order Rx - Pend for 90 day refill.      Last Seen: Last Seen Department: 12/18/2023  Last Seen by PCP: 12/7/2023    Last Written:   Metoprolol-09/22/2023 180 tab 1 refills   Simvastatin-09/22/2023 90 tab 1 refills       If no future appointment scheduled:  Review the last OV with PCP and review information for follow-up visit,  Route STAFF MESSAGE with patient name to the  Pool for scheduling with the following information:            -  Timing of next visit           -  Visit type ie Physical, OV, etc           -  Diagnoses/Reason ie. COPD, HTN - Do not use MEDICATION, Follow-up or CHECK UP - Give reason for visit      Next Appointment:   Future Appointments   Date Time Provider Department Center   3/8/2024  3:50 PM Garrett Grullon MD [facility]       Message sent to  to schedule appt with patient?  N/A      Requested Prescriptions     Pending Prescriptions Disp Refills    metoprolol tartrate (LOPRESSOR) 25 MG tablet 180 tablet 1     Sig: Take 1 tablet by mouth 2 times daily    simvastatin (ZOCOR) 40 MG tablet 90 tablet 1     Sig: Take 1 tablet by mouth nightly

## 2024-02-07 ENCOUNTER — TELEPHONE (OUTPATIENT)
Dept: FAMILY MEDICINE CLINIC | Age: 89
End: 2024-02-07

## 2024-02-07 RX ORDER — LISINOPRIL 40 MG/1
40 TABLET ORAL DAILY
Qty: 90 TABLET | Refills: 1 | Status: SHIPPED | OUTPATIENT
Start: 2024-02-07

## 2024-02-07 RX ORDER — SIMVASTATIN 40 MG
40 TABLET ORAL NIGHTLY
Qty: 90 TABLET | Refills: 1 | Status: SHIPPED | OUTPATIENT
Start: 2024-02-07

## 2024-02-07 NOTE — TELEPHONE ENCOUNTER
----- Message from Ronda Olivia sent at 2/6/2024  3:52 PM EST -----  Subject: Appointment Request    Reason for Call: Established Patient Appointment needed: Routine Existing   Condition Follow Up    QUESTIONS    Reason for appointment request? No appointments available during search     Additional Information for Provider? PT called to see if she could be seen   on 2/23. She has transportation that day. No appts found. Please f/u.  ---------------------------------------------------------------------------  --------------  CALL BACK INFO  [phone]; OK to leave message on voicemail  ---------------------------------------------------------------------------  --------------  SCRIPT ANSWERS

## 2024-02-08 NOTE — TELEPHONE ENCOUNTER
Spoke with pt she stated that she will have to call us back and it was an appt needed to discuss medication and she stated that when her daughter comes back she will have them call back will leave this in pt calls.

## 2024-02-09 NOTE — TELEPHONE ENCOUNTER
Patient just wanted to discuss pharmacy issues. I was able to help her with her scripts and confirmed that she would like to use [facility] for her medications and [facility] for antibiotics.

## 2024-03-13 NOTE — PROGRESS NOTES
EMERGENCY DEPARTMENT ENCOUNTER      NAME: Felipe Verma  AGE: 83 year old male  YOB: 1940  MRN: [id number]  EVALUATION DATE & TIME: 3/12/2024  9:51 PM    PCP: Pro Chávez    ED PROVIDER: Catalino Tejada D.O.      Chief Complaint   Patient presents with    Dizziness       FINAL IMPRESSION:  1. PAF (paroxysmal atrial fibrillation) (H)        ED COURSE & MEDICAL DECISION MAKING:    10:35 PM I met with the patient to gather history and to perform my initial exam. I discussed the plan for care while in the Emergency Department.  10:48 PM lab called to report the patient's troponin is 214  11:00 PM I spoke with Dr. Mcgowan with Cardiology   1:16 AM patient care turned over to Dr. Ho pending repeat troponin         Pertinent Labs & Imaging studies reviewed. (See chart for details)  83 year old male presents to the Emergency Department for evaluation of lightheadedness and rapid heart rate.  Patient has known history of atrial fibrillation, and appears to be in the same today.  The atrial fibrillation did spontaneously resolved without intervention.  He has no hypoxia, and after conversion back to normal sinus rhythm, no significant tachycardia.  Otherwise PERC negative other than age, do not believe CT imaging to evaluate for PE was indicated.  EKG did not show any definitive ischemia, first troponin was negative, second troponin is pending.  Clinically no concern for dissection, pneumothorax, pneumonia.  Decided troponin is negative, after my discussion with cardiology, we have believe that patient is appropriate for discharge home with outpatient follow-up with [facility].  Patient care turned over the oncoming provider for final disposition.    Medical Decision Making  Obtained supplemental history:Supplemental history obtained?: Family Member/Significant Other  Reviewed external records: External records reviewed?: No  Care impacted by chronic illness:N/A  Care significantly affected by  HPI: as per patient provided history  Exam: N/A (electronic visit)  ASSESSMENT/PLAN:  1. Viral URI  Discussed the natural course of a viral illness and advised that antibiotics would not help in this situation. Recommend nasal saline prn, mucinex, and fluids. Patient to call office if no improvement in 1 week        Patient instructed to call the office if worsens, or fails to improve as anticipated. 5-10 minutes were spent on the digital evaluation and management of this patient. "social determinants of health:N/A  Did you consider but not order tests?: Work up considered but not performed and documented in chart, if applicable  Did you interpret images independently?: Independent interpretation of ECG and images noted in documentation, when applicable.  Consultation discussion with other provider:Did you involve another provider (consultant, , pharmacy, etc.)?: I discussed the care with another health care provider, see documentation for details.  Admission considered. Patient was signed out to the oncoming physician, disposition pending.    At the conclusion of the encounter I discussed the results of all of the tests and the disposition. The questions were answered. The patient or family acknowledged understanding and was agreeable with the care plan.        HPI    Patient information was obtained from: Patient     Use of : N/A      Felipe Verma is a 83 year old male who presents with dizziness     Per chart review: Patient was admitted to [facility] from 2/22/2024 to 2/24/2024 for atrial fibrillation and near syncope.  Patient has pertinent medical history of \"CAD s/p PCI to mid LAD 4/23, cardiomyopathy with LVEF 40% (4/23), paroxysmal atrial fibrillation anticoagulation on DOAC\".  EMS found the patient to be hypotensive with a systolic blood pressure in the 60s and heart rate in the 160s.  Patient's EKG was found to be in A-fib with RVR. Patient's troponin elevated secondary to demand ischemia.  Patient had a repeat cardiogram that showed an unchanged EF.   Patient's metoprolol increased to 75 mg and plan to follow-up as an outpatient with plan for possible catheter ablation.  Patient's Ticagrelor changed to Plavix, patient continued on Eliquis.  Rosuvastatin was changed to atorvastatin given CKD.  Patient's losartan decreased to 25 mg.     The patient reports he \"just got dizzy\" and is now \"ready to go home\".  The patient reports that he was walking around " "grocery shopping today and \"not drinking enough water\".  Patient reports he got home and ate dinner, but then developed dizziness at around 8 PM tonight.  The patient reports that he felt lightheaded at the time, but denies any loss of consciousness.  The patient's wife called EMS, the patient did not want an ambulance or to be seen for this.  Of note, the patient was found to be in atrial fibrillation, but is now not in atrial fibrillation.    The patient denies any shortness of breath, chest pain, or any other symptoms or complaints.    REVIEW OF SYSTEMS  Constitutional:  Denies fever, chills, weight loss or weakness  Eyes:  No pain, discharge, redness  HENT:  Denies sore throat, ear pain, congestion  Respiratory: No SOB, wheeze or cough  Cardiovascular:  No CP, palpitations  GI:  Denies abdominal pain, nausea, vomiting, diarrhea  : Denies dysuria, hematuria  Musculoskeletal:  Denies any new muscle/joint pain, swelling or loss of function.  Skin:  Denies rash, pallor  Neurologic:  Denies headache, focal weakness or sensory changes. Positive for lightheadedness and dizziness (resolved now)  Lymph: Denies swollen nodes    All other systems negative unless noted in HPI.    PAST MEDICAL HISTORY:  Past Medical History:   Diagnosis Date    Acute diverticulitis 4/20/2016    Lung nodule < 6cm on CT 6/6/2018       PAST SURGICAL HISTORY:  Past Surgical History:   Procedure Laterality Date    CV CORONARY ANGIOGRAM N/A 4/11/2023    Procedure: Coronary Angiogram;  Surgeon: Alejandro Hitchcock MD;  Location: [facility]    CV PCI STENT DRUG ELUTING N/A 4/11/2023    Procedure: Percutaneous Coronary Intervention Stent;  Surgeon: Alejandro Hitchcock MD;  Location: [facility] CV    OTHER SURGICAL HISTORY      OTHER SURGICAL HISTORYstent placement in head     TONSILLECTOMY           CURRENT MEDICATIONS:    No current facility-administered medications for this encounter.     Current Outpatient Medications " "  Medication    acetaminophen (TYLENOL) 500 MG tablet    apixaban ANTICOAGULANT (ELIQUIS) 2.5 MG tablet    atorvastatin (LIPITOR) 20 MG tablet    calcium carbonate 500 mg, elemental, (OSCAL 500) 1250 (500 Ca) MG TABS tablet    clopidogrel (PLAVIX) 75 MG tablet    furosemide (LASIX) 20 MG tablet    hydroxychloroquine (PLAQUENIL) 200 MG tablet    leucovorin (WELLCOVORIN) 5 mg tablet    losartan (COZAAR) 25 MG tablet    methotrexate 2.5 MG tablet    metoprolol succinate ER (TOPROL XL) 25 MG 24 hr tablet    nitroGLYcerin (NITROSTAT) 0.4 MG sublingual tablet    oxybutynin ER (DITROPAN XL) 5 MG 24 hr tablet    tamsulosin (FLOMAX) 0.4 MG capsule    vitamin B-12 (CYANOCOBALAMIN) 1000 MCG tablet    vitamin D3 (CHOLECALCIFEROL) 50 mcg (2000 units) tablet         ALLERGIES:  No Known Allergies    FAMILY HISTORY:  Family History   Problem Relation Age of Onset    Colon Cancer Mother     Hypertension Father     Coronary Artery Disease Father     Aneurysm Sister     Cerebrovascular Disease Sister     No Known Problems Brother     Diabetes Son     No Known Problems Son     No Known Problems Son     Breast Cancer No family hx of     Prostate Cancer No family hx of        SOCIAL HISTORY:  Social History     Socioeconomic History    Marital status:    Tobacco Use    Smoking status: Former    Smokeless tobacco: Never   Vaping Use    Vaping Use: Never used   Substance and Sexual Activity    Drug use: Not Currently    Sexual activity: Not Currently       VITALS:  Patient Vitals for the past 24 hrs:   BP Temp Pulse Resp SpO2 Height Weight   03/13/24 0045 (!) 144/79 -- 61 21 99 % -- --   03/13/24 0030 (!) 144/75 -- 63 18 99 % -- --   03/12/24 2315 127/75 -- 81 24 99 % -- --   03/12/24 2215 131/80 -- 87 24 97 % -- --   03/12/24 2206 -- -- 88 26 -- -- --   03/12/24 2200 117/87 -- (!) 134 19 98 % -- --   03/12/24 2155 -- -- -- -- -- 1.702 m (5' 7\") 78 kg (172 lb)   03/12/24 2151 132/83 97.9  F (36.6  C) 106 18 99 % -- -- " "      PHYSICAL EXAM    VITAL SIGNS: BP (!) 144/79   Pulse 61   Temp 97.9  F (36.6  C)   Resp 21   Ht 1.702 m (5' 7\")   Wt 78 kg (172 lb)   SpO2 99%   BMI 26.94 kg/m      General Appearance: Well-appearing, well-nourished, no acute distress   Head:  Normocephalic, without obvious abnormality, atraumatic  Eyes:  PERRL, conjunctiva/corneas clear, EOM's intact,  ENT:  Lips, mucosa, and tongue normal, membranes are moist without pallor  Neck:  Normal ROM, symmetrical, trachea midline    Cardio:  Regular rate and rhythm, no murmur, rub or gallop, 2+ pulses symmetric in all extremities  Pulm:  Clear to auscultation bilaterally, respirations unlabored,  Abdomen:  Soft, non-tender, no rebound or guarding.  Musculoskeletal: Full ROM, no edema, no cyanosis, good ROM of major joints  Integument:  Warm, Dry, No erythema, No rash.    Neurologic:  Alert & oriented.  No focal deficits appreciated.  Ambulatory.  Psychiatric:  Affect normal, Judgment normal, Mood normal.      LABS  Results for orders placed or performed during the hospital encounter of 03/12/24 (from the past 24 hour(s))   Comprehensive metabolic panel   Result Value Ref Range    Sodium 138 135 - 145 mmol/L    Potassium 3.7 3.4 - 5.3 mmol/L    Carbon Dioxide (CO2) 20 (L) 22 - 29 mmol/L    Anion Gap 12 7 - 15 mmol/L    Urea Nitrogen 25.9 (H) 8.0 - 23.0 mg/dL    Creatinine 1.65 (H) 0.67 - 1.17 mg/dL    GFR Estimate 41 (L) >60 mL/min/1.73m2    Calcium 8.6 (L) 8.8 - 10.2 mg/dL    Chloride 106 98 - 107 mmol/L    Glucose 101 (H) 70 - 99 mg/dL    Alkaline Phosphatase 76 40 - 150 U/L    AST 15 0 - 45 U/L    ALT 13 0 - 70 U/L    Protein Total 6.3 (L) 6.4 - 8.3 g/dL    Albumin 3.9 3.5 - 5.2 g/dL    Bilirubin Total 0.3 <=1.2 mg/dL   Troponin T, High Sensitivity (now)   Result Value Ref Range    Troponin T, High Sensitivity 214 (HH) <=22 ng/L   CBC with platelets + differential    Narrative    The following orders were created for panel order CBC with platelets + " differential.  Procedure                               Abnormality         Status                     ---------                               -----------         ------                     CBC with platelets and d...[[id number]]  Abnormal            Final result                 Please view results for these tests on the individual orders.   CBC with platelets and differential   Result Value Ref Range    WBC Count 8.1 4.0 - 11.0 10e3/uL    RBC Count 2.82 (L) 4.40 - 5.90 10e6/uL    Hemoglobin 9.6 (L) 13.3 - 17.7 g/dL    Hematocrit 29.3 (L) 40.0 - 53.0 %     (H) 78 - 100 fL    MCH 34.0 (H) 26.5 - 33.0 pg    MCHC 32.8 31.5 - 36.5 g/dL    RDW 14.2 10.0 - 15.0 %    Platelet Count 173 150 - 450 10e3/uL    % Neutrophils 69 %    % Lymphocytes 20 %    % Monocytes 8 %    % Eosinophils 2 %    % Basophils 0 %    % Immature Granulocytes 1 %    NRBCs per 100 WBC 0 <1 /100    Absolute Neutrophils 5.6 1.6 - 8.3 10e3/uL    Absolute Lymphocytes 1.6 0.8 - 5.3 10e3/uL    Absolute Monocytes 0.7 0.0 - 1.3 10e3/uL    Absolute Eosinophils 0.1 0.0 - 0.7 10e3/uL    Absolute Basophils 0.0 0.0 - 0.2 10e3/uL    Absolute Immature Granulocytes 0.1 <=0.4 10e3/uL    Absolute NRBCs 0.0 10e3/uL   XR Chest Port 1 View    Narrative    EXAM: XR CHEST PORT 1 VIEW  LOCATION: [facility]  DATE: 3/12/2024    INDICATION: Lightheadedness  COMPARISON: 02/22/2024      Impression    IMPRESSION: Lungs are clear without consolidation, pleural effusion or pneumothorax. Heart size and pulmonary vascularity are normal.   UA with Microscopic reflex to Culture    Specimen: Urine, [place] Catheter   Result Value Ref Range    Color Urine Colorless Colorless, Straw, Light Yellow, Yellow    Appearance Urine Clear Clear    Glucose Urine Negative Negative mg/dL    Bilirubin Urine Negative Negative    Ketones Urine Negative Negative mg/dL    Specific Gravity Urine 1.004 1.001 - 1.030    Blood Urine 0.2 mg/dL (A) Negative    pH Urine 7.5 (H) 5.0 -  7.0    Protein Albumin Urine Negative Negative mg/dL    Urobilinogen Urine <2.0 <2.0 mg/dL    Nitrite Urine Positive (A) Negative    Leukocyte Esterase Urine 250 Deanna/uL (A) Negative    Budding Yeast Urine Few (A) None Seen /HPF    Mucus Urine Present (A) None Seen /LPF    RBC Urine 3 (H) <=2 /HPF    WBC Urine 5 <=5 /HPF    Hyaline Casts Urine 1 <=2 /LPF    Narrative    Urine Culture ordered based on laboratory criteria         RADIOLOGY  XR Chest Port 1 View   Final Result   IMPRESSION: Lungs are clear without consolidation, pleural effusion or pneumothorax. Heart size and pulmonary vascularity are normal.             EKG:    Rate: 124 bpm  Rhythm: Atrial fibrillation with RVR  Axis: Normal  Interval: Normal  Conduction: Normal  QRS: Normal  ST: Normal  T-wave: Normal  QT: Not prolonged  Comparison EKG: Patient was in sinus rhythm on 22 February 2024 otherwise no significant change  Impression:  No acute ischemic change     EKG #2    Rate: 90 bpm  Rhythm: Normal Sinus Rhythm  Axis: Normal  Interval: Normal  Conduction: Normal  QRS: Normal  ST: Normal  T-wave: Normal  QT: Not prolonged  Comparison EKG: Patient is no longer in atrial fibrillation when compared to previous today  Impression:  No acute ischemic change   I have independently reviewed and interpreted today's EKG, pending Cardiologist read      I have independently reviewed and interpreted the EKG(s) documented above.        MEDICATIONS GIVEN IN THE EMERGENCY:  Medications - No data to display    NEW PRESCRIPTIONS STARTED AT TODAY'S ER VISIT  New Prescriptions    No medications on file        I, Vicky Faye, am serving as a scribe to document services personally performed by Catalino Tejada D.O., based on my observations and the provider's statements to me.  I, Catalino Tejada D.O., attest that Vicky Faye is acting in a scribe capacity, has observed my performance of the services and has documented them in accordance with my direction.     Catalino Tejada  LINDA  Emergency Medicine  [facility] EMERGENCY DEPARTMENT  [address] [place] [id number]  [phone]  Dept: [phone]       Catalino Tejada DO  03/13/24 1108

## 2024-04-12 RX ORDER — FLUTICASONE PROPIONATE 50 MCG
SPRAY, SUSPENSION (ML) NASAL
Qty: 48 G | Refills: 1 | Status: SHIPPED | OUTPATIENT
Start: 2024-04-12

## 2024-04-12 NOTE — TELEPHONE ENCOUNTER
Refill Request     CONFIRM preferred pharmacy with the patient.    If Mail Order Rx - Pend for 90 day refill.      Last Seen: Last Seen Department: 12/18/2023  Last Seen by PCP: 12/7/2023    Last Written: 9/22/2023, #48, 1 refill    If no future appointment scheduled:  Review the last OV with PCP and review information for follow-up visit,  Route STAFF MESSAGE with patient name to the  Pool for scheduling with the following information:            -  Timing of next visit           -  Visit type ie Physical, OV, etc           -  Diagnoses/Reason ie. COPD, HTN - Do not use MEDICATION, Follow-up or CHECK UP - Give reason for visit      Next Appointment:   Future Appointments   Date Time Provider Department Center   5/14/2024  9:10 AM Carmita Rowe APRN - CNP [facility]       Message sent to  to schedule appt with patient?  NO      Requested Prescriptions     Pending Prescriptions Disp Refills    fluticasone (FLONASE) 50 MCG/ACT nasal spray [Pharmacy Med Name: Fluticasone Propionate 50 MCG/ACT Nasal Suspension] 48 g 1     Sig: USE 2 SPRAYS NASALLY DAILY

## 2024-04-15 ENCOUNTER — TELEPHONE (OUTPATIENT)
Dept: FAMILY MEDICINE CLINIC | Age: 89
End: 2024-04-15

## 2024-04-15 NOTE — TELEPHONE ENCOUNTER
Her daughter stated that it is not new that it started when you changed her lisinopril.  When she was taken off the lisinopril hydrochlorothiazide she started with swelling but it has steadly gotten worse and she is not able to put her shoes on they are tight.

## 2024-04-15 NOTE — TELEPHONE ENCOUNTER
Patient's daughter called in to the office to request appointment.  Patient's daughter stated that her mother was having swelling noted to both of her legs.  Patient also has complaints of stuffy nose, congestion.  Patient denies shortness of breath, chest pain, numbness, dizziness.  Nurse advised the patient to elevate her feet as much as possible and to watch her sodium\salt intake.  Patient verbalized understanding.  Patient scheduled for 04/25/24.  Patient and patient's daughter advised that if her mother develops new or worsening symptoms to call the office back.  Patient and patient's daughter verbalized understanding.

## 2024-04-15 NOTE — TELEPHONE ENCOUNTER
Patient scheduled with NP on 4/17/24 per patient's daughters schedule.  Patient advised that if she develops new or worsening symptoms.  Patient's daughter verbalized understanding.

## 2024-04-17 ENCOUNTER — OFFICE VISIT (OUTPATIENT)
Dept: FAMILY MEDICINE CLINIC | Age: 89
End: 2024-04-17
Payer: MEDICARE

## 2024-04-17 VITALS
DIASTOLIC BLOOD PRESSURE: 60 MMHG | HEIGHT: 65 IN | BODY MASS INDEX: 29.49 KG/M2 | OXYGEN SATURATION: 99 % | SYSTOLIC BLOOD PRESSURE: 152 MMHG | WEIGHT: 177 LBS | HEART RATE: 66 BPM

## 2024-04-17 DIAGNOSIS — R09.81 CHRONIC NASAL CONGESTION: Primary | ICD-10-CM

## 2024-04-17 DIAGNOSIS — M25.473 ANKLE SWELLING, UNSPECIFIED LATERALITY: ICD-10-CM

## 2024-04-17 PROCEDURE — G8427 DOCREV CUR MEDS BY ELIG CLIN: HCPCS | Performed by: NURSE PRACTITIONER

## 2024-04-17 PROCEDURE — G8417 CALC BMI ABV UP PARAM F/U: HCPCS | Performed by: NURSE PRACTITIONER

## 2024-04-17 PROCEDURE — 99213 OFFICE O/P EST LOW 20 MIN: CPT | Performed by: NURSE PRACTITIONER

## 2024-04-17 PROCEDURE — 1036F TOBACCO NON-USER: CPT | Performed by: NURSE PRACTITIONER

## 2024-04-17 PROCEDURE — 1090F PRES/ABSN URINE INCON ASSESS: CPT | Performed by: NURSE PRACTITIONER

## 2024-04-17 PROCEDURE — 1123F ACP DISCUSS/DSCN MKR DOCD: CPT | Performed by: NURSE PRACTITIONER

## 2024-04-17 RX ORDER — FUROSEMIDE 20 MG/1
20 TABLET ORAL DAILY
Qty: 30 TABLET | Refills: 0 | Status: CANCELLED | OUTPATIENT
Start: 2024-04-17

## 2024-04-17 RX ORDER — CEFDINIR 300 MG/1
300 CAPSULE ORAL 2 TIMES DAILY
Qty: 14 CAPSULE | Refills: 0 | Status: CANCELLED | OUTPATIENT
Start: 2024-04-17 | End: 2024-04-24

## 2024-04-17 SDOH — ECONOMIC STABILITY: FOOD INSECURITY: WITHIN THE PAST 12 MONTHS, YOU WORRIED THAT YOUR FOOD WOULD RUN OUT BEFORE YOU GOT MONEY TO BUY MORE.: NEVER TRUE

## 2024-04-17 SDOH — ECONOMIC STABILITY: INCOME INSECURITY: HOW HARD IS IT FOR YOU TO PAY FOR THE VERY BASICS LIKE FOOD, HOUSING, MEDICAL CARE, AND HEATING?: NOT HARD AT ALL

## 2024-04-17 SDOH — ECONOMIC STABILITY: FOOD INSECURITY: WITHIN THE PAST 12 MONTHS, THE FOOD YOU BOUGHT JUST DIDN'T LAST AND YOU DIDN'T HAVE MONEY TO GET MORE.: NEVER TRUE

## 2024-04-17 ASSESSMENT — ANXIETY QUESTIONNAIRES
4. TROUBLE RELAXING: NOT AT ALL
IF YOU CHECKED OFF ANY PROBLEMS ON THIS QUESTIONNAIRE, HOW DIFFICULT HAVE THESE PROBLEMS MADE IT FOR YOU TO DO YOUR WORK, TAKE CARE OF THINGS AT HOME, OR GET ALONG WITH OTHER PEOPLE: NOT DIFFICULT AT ALL
3. WORRYING TOO MUCH ABOUT DIFFERENT THINGS: NOT AT ALL
5. BEING SO RESTLESS THAT IT IS HARD TO SIT STILL: NOT AT ALL
6. BECOMING EASILY ANNOYED OR IRRITABLE: NOT AT ALL
2. NOT BEING ABLE TO STOP OR CONTROL WORRYING: NOT AT ALL
GAD7 TOTAL SCORE: 0
1. FEELING NERVOUS, ANXIOUS, OR ON EDGE: NOT AT ALL
7. FEELING AFRAID AS IF SOMETHING AWFUL MIGHT HAPPEN: NOT AT ALL

## 2024-04-17 ASSESSMENT — PATIENT HEALTH QUESTIONNAIRE - PHQ9
1. LITTLE INTEREST OR PLEASURE IN DOING THINGS: NOT AT ALL
10. IF YOU CHECKED OFF ANY PROBLEMS, HOW DIFFICULT HAVE THESE PROBLEMS MADE IT FOR YOU TO DO YOUR WORK, TAKE CARE OF THINGS AT HOME, OR GET ALONG WITH OTHER PEOPLE: NOT DIFFICULT AT ALL
3. TROUBLE FALLING OR STAYING ASLEEP: NEARLY EVERY DAY
7. TROUBLE CONCENTRATING ON THINGS, SUCH AS READING THE NEWSPAPER OR WATCHING TELEVISION: NOT AT ALL
5. POOR APPETITE OR OVEREATING: NOT AT ALL
SUM OF ALL RESPONSES TO PHQ QUESTIONS 1-9: 3
SUM OF ALL RESPONSES TO PHQ QUESTIONS 1-9: 3
6. FEELING BAD ABOUT YOURSELF - OR THAT YOU ARE A FAILURE OR HAVE LET YOURSELF OR YOUR FAMILY DOWN: NOT AT ALL
8. MOVING OR SPEAKING SO SLOWLY THAT OTHER PEOPLE COULD HAVE NOTICED. OR THE OPPOSITE, BEING SO FIGETY OR RESTLESS THAT YOU HAVE BEEN MOVING AROUND A LOT MORE THAN USUAL: NOT AT ALL
4. FEELING TIRED OR HAVING LITTLE ENERGY: NOT AT ALL
SUM OF ALL RESPONSES TO PHQ QUESTIONS 1-9: 3
2. FEELING DOWN, DEPRESSED OR HOPELESS: NOT AT ALL
SUM OF ALL RESPONSES TO PHQ9 QUESTIONS 1 & 2: 0
9. THOUGHTS THAT YOU WOULD BE BETTER OFF DEAD, OR OF HURTING YOURSELF: NOT AT ALL
SUM OF ALL RESPONSES TO PHQ QUESTIONS 1-9: 3

## 2024-04-17 ASSESSMENT — ENCOUNTER SYMPTOMS
SINUS PRESSURE: 0
SINUS PAIN: 0
COUGH: 0
VOMITING: 0
SORE THROAT: 0
NAUSEA: 0
ABDOMINAL PAIN: 0
DIARRHEA: 0
RHINORRHEA: 1
SHORTNESS OF BREATH: 0

## 2024-04-17 NOTE — PATIENT INSTRUCTIONS
Drink plenty of fluids   Go to nearest ER if develop shortness of breath, chest pain or significant worsening of symptoms    Edema instructions:   Elevate when resting   Limit salt intake   Compression socks daily, remove at night   Follow up with Dr. Mckeon as scheduled on 4/25

## 2024-04-17 NOTE — PROGRESS NOTES
Rosie Poole (:  1934) is a 89 y.o. female,Established patient, here for evaluation of the following chief complaint(s):  Nasal Congestion (Sxs months), Foot Swelling, and Leg Swelling      Assessment & Plan   ASSESSMENT/PLAN:  1. Chronic nasal congestion  2. Ankle swelling, unspecified laterality  -     Basic Metabolic Panel; Future    -Recommend follow up with Dr. Jeter for chronic nasal congestion and cerumen impaction to right ear , reviewed last notes from his office   -Discussed humidifier in room, elevating while sleeping   -Discussed adding astelin nasal spray   -Large amount of cerumen bilaterally, ears flushed by MA,   -discussed using pillows to help elevate her legs higher with ankles above knees but patient's daughter reports that she can't do this due ot back issues   -Discussed knee length compression socks  -Reviewed allergies, discussed medication risks, benefits, side effects. Take medication with food.   -Reviewed symptom management   -Reviewed alarm symptoms  -Keep follow up appointment with PCP as scheduled on     Patient Instructions   Drink plenty of fluids   Go to nearest ER if develop shortness of breath, chest pain or significant worsening of symptoms    Edema instructions:   Elevate when resting   Limit salt intake   Compression socks daily, remove at night   Follow up with Dr. Mckeon as scheduled on      Return if symptoms worsen or fail to improve.         Subjective   SUBJECTIVE/OBJECTIVE:  Nasal congestion for several months   Has been taking Mucinex, Neti pot, Flonase daily   Has been seen by Dr. Jeter last year for chronic sinusitis and ETD           Also reports ongoing issues with chronic edema to bilateral legs. HCtZ was stopped in 2023 as patient was having abnormal lab values at the time. Patient was also transitioned from amlodipine to Lisinopril to aid BP and reduce swelling. Swelling has been present since before that but has worsened over last few

## 2024-04-18 LAB
ANION GAP SERPL CALCULATED.3IONS-SCNC: 11 MMOL/L (ref 3–16)
BUN SERPL-MCNC: 22 MG/DL (ref 7–20)
CALCIUM SERPL-MCNC: 10.4 MG/DL (ref 8.3–10.6)
CHLORIDE SERPL-SCNC: 108 MMOL/L (ref 99–110)
CO2 SERPL-SCNC: 27 MMOL/L (ref 21–32)
CREAT SERPL-MCNC: 0.7 MG/DL (ref 0.6–1.2)
GFR SERPLBLD CREATININE-BSD FMLA CKD-EPI: 82 ML/MIN/{1.73_M2}
GLUCOSE SERPL-MCNC: 98 MG/DL (ref 70–99)
POTASSIUM SERPL-SCNC: 4.3 MMOL/L (ref 3.5–5.1)
SODIUM SERPL-SCNC: 146 MMOL/L (ref 136–145)

## 2024-04-24 PROBLEM — M24.28 LIGAMENTUM FLAVUM HYPERTROPHY: Status: ACTIVE | Noted: 2024-04-24

## 2024-04-24 PROBLEM — G89.4 CHRONIC PAIN SYNDROME: Status: ACTIVE | Noted: 2024-04-24

## 2024-04-25 ENCOUNTER — OFFICE VISIT (OUTPATIENT)
Dept: FAMILY MEDICINE CLINIC | Age: 89
End: 2024-04-25
Payer: MEDICARE

## 2024-04-25 VITALS
WEIGHT: 190.2 LBS | BODY MASS INDEX: 31.69 KG/M2 | DIASTOLIC BLOOD PRESSURE: 68 MMHG | HEIGHT: 65 IN | SYSTOLIC BLOOD PRESSURE: 108 MMHG | TEMPERATURE: 97.8 F | OXYGEN SATURATION: 98 % | HEART RATE: 65 BPM

## 2024-04-25 DIAGNOSIS — R60.0 BILATERAL LOWER EXTREMITY EDEMA: Primary | ICD-10-CM

## 2024-04-25 DIAGNOSIS — R20.0 BILATERAL HAND NUMBNESS: ICD-10-CM

## 2024-04-25 DIAGNOSIS — M48.061 SPINAL STENOSIS, LUMBAR REGION, WITHOUT NEUROGENIC CLAUDICATION: Chronic | ICD-10-CM

## 2024-04-25 DIAGNOSIS — I10 PRIMARY HYPERTENSION: Chronic | ICD-10-CM

## 2024-04-25 PROCEDURE — 1123F ACP DISCUSS/DSCN MKR DOCD: CPT | Performed by: FAMILY MEDICINE

## 2024-04-25 PROCEDURE — 99214 OFFICE O/P EST MOD 30 MIN: CPT | Performed by: FAMILY MEDICINE

## 2024-04-25 PROCEDURE — G8417 CALC BMI ABV UP PARAM F/U: HCPCS | Performed by: FAMILY MEDICINE

## 2024-04-25 PROCEDURE — G8427 DOCREV CUR MEDS BY ELIG CLIN: HCPCS | Performed by: FAMILY MEDICINE

## 2024-04-25 PROCEDURE — 1036F TOBACCO NON-USER: CPT | Performed by: FAMILY MEDICINE

## 2024-04-25 PROCEDURE — 1090F PRES/ABSN URINE INCON ASSESS: CPT | Performed by: FAMILY MEDICINE

## 2024-04-25 RX ORDER — TORSEMIDE 5 MG/1
5 TABLET ORAL DAILY
Qty: 30 TABLET | Refills: 0 | Status: SHIPPED | OUTPATIENT
Start: 2024-04-25

## 2024-04-25 SDOH — ECONOMIC STABILITY: FOOD INSECURITY: WITHIN THE PAST 12 MONTHS, THE FOOD YOU BOUGHT JUST DIDN'T LAST AND YOU DIDN'T HAVE MONEY TO GET MORE.: NEVER TRUE

## 2024-04-25 SDOH — ECONOMIC STABILITY: INCOME INSECURITY: HOW HARD IS IT FOR YOU TO PAY FOR THE VERY BASICS LIKE FOOD, HOUSING, MEDICAL CARE, AND HEATING?: NOT HARD AT ALL

## 2024-04-25 SDOH — ECONOMIC STABILITY: FOOD INSECURITY: WITHIN THE PAST 12 MONTHS, YOU WORRIED THAT YOUR FOOD WOULD RUN OUT BEFORE YOU GOT MONEY TO BUY MORE.: NEVER TRUE

## 2024-04-29 NOTE — TELEPHONE ENCOUNTER
Refill Request     CONFIRM preferred pharmacy with the patient.    If Mail Order Rx - Pend for 90 day refill.      Last Seen: Last Seen Department: 4/25/2024  Last Seen by PCP: 4/25/2024    Last Written:   Proair-01/18/2024 3 each 3 refills   Ventolin-01/30/2024 54 g 1 refills     If no future appointment scheduled:  Review the last OV with PCP and review information for follow-up visit,  Route STAFF MESSAGE with patient name to the  Pool for scheduling with the following information:            -  Timing of next visit           -  Visit type ie Physical, OV, etc           -  Diagnoses/Reason ie. COPD, HTN - Do not use MEDICATION, Follow-up or CHECK UP - Give reason for visit      Next Appointment:   Future Appointments   Date Time Provider Department Center   5/9/2024  4:00 PM Shobha Mckeon MD [facility]       Message sent to  to schedule appt with patient?  NO      Requested Prescriptions     Pending Prescriptions Disp Refills    albuterol sulfate (PROAIR RESPICLICK) 108 (90 Base) MCG/ACT aerosol powder inhalation 3 each 3     Sig: Inhale 1-2 puffs into the lungs every 4 hours as needed for Wheezing or Shortness of Breath    albuterol sulfate HFA (VENTOLIN HFA) 108 (90 Base) MCG/ACT inhaler 54 g 1     Sig: Inhale 1-2 puffs into the lungs every 4 hours as needed for Wheezing or Shortness of Breath

## 2024-04-30 NOTE — PROGRESS NOTES
Rosie Poole (:  1934) is a 89 y.o. female,Established patient, here for evaluation of the following chief complaint(s):  Foot Swelling (X 4 month , since being taken off water pill/) and Hand Numbness (bilateral)      Assessment & Plan   1. Bilateral lower extremity edema  -     torsemide (DEMADEX) 5 MG tablet; Take 1 tablet by mouth daily, Disp-30 tablet, R-0Normal  Chronic problem, currently worse. Agree with low sodium diet, trying to elevate legs when possible. She cannot do compression stockings or really any type of compression on her legs as she is not able to bend over to put anything on, and also cannot use her hands due to numbness. Will start small amount of torsemide. Will need to watch BP closely given it is very well controlled currently, risk for hypotension if adding diuretic. Will try to keep dose as small as possible. Consider BNP, echo if not improving. Repeat BMP in 2 weeks to monitor for renal function and potential for hypokalemia on loop diuretic.   2. Bilateral hand numbness  -     External Referral To Hand Surgery  Recurrent problem, currently worse. Offered to order EMG and/or MRI of cervical spine for further evaluation. Her daughter would like for her to see Dr. Mcgovern as she has been told in the past she has carpal tunnel syndrome again.   3. Spinal stenosis, lumbar region, without neurogenic claudication  Affecting treatment for leg swelling as she cannot elevate legs or bend to put on compression stockings.  4. Primary hypertension  She is still taking lisinopril. BP is very well controlled today. Only using very small amount of diuretic as I do not want her BP going any lower. Adding diuretic is a risk for developing hypotension for her. Call if any lightheadedness.     Return in about 2 weeks (around 2024) for edema, diuretic, AWV (ok to use provider fill or can see Dante).       Subjective   Chief Complaint   Patient presents with    Foot Swelling     X 4

## 2024-05-01 RX ORDER — ALBUTEROL SULFATE 90 UG/1
1-2 AEROSOL, METERED RESPIRATORY (INHALATION) EVERY 4 HOURS PRN
Qty: 54 G | Refills: 1 | Status: SHIPPED | OUTPATIENT
Start: 2024-05-01

## 2024-05-01 NOTE — TELEPHONE ENCOUNTER
albuterol sulfate HFA (VENTOLIN HFA) 108 (90 Base) MCG/ACT inhaler is the one she is trying to take, insurance wont approve it. Needs other options for an inhaler.    Please advise thanks

## 2024-05-09 ENCOUNTER — OFFICE VISIT (OUTPATIENT)
Dept: FAMILY MEDICINE CLINIC | Age: 89
End: 2024-05-09
Payer: MEDICARE

## 2024-05-09 VITALS
TEMPERATURE: 97.7 F | HEART RATE: 74 BPM | HEIGHT: 65 IN | OXYGEN SATURATION: 96 % | SYSTOLIC BLOOD PRESSURE: 130 MMHG | BODY MASS INDEX: 28.99 KG/M2 | WEIGHT: 174 LBS | DIASTOLIC BLOOD PRESSURE: 58 MMHG

## 2024-05-09 DIAGNOSIS — E78.5 HYPERLIPIDEMIA, UNSPECIFIED HYPERLIPIDEMIA TYPE: Chronic | ICD-10-CM

## 2024-05-09 DIAGNOSIS — I10 PRIMARY HYPERTENSION: Chronic | ICD-10-CM

## 2024-05-09 DIAGNOSIS — Z00.00 MEDICARE ANNUAL WELLNESS VISIT, SUBSEQUENT: Primary | ICD-10-CM

## 2024-05-09 DIAGNOSIS — R60.0 BILATERAL LOWER EXTREMITY EDEMA: ICD-10-CM

## 2024-05-09 PROCEDURE — G0439 PPPS, SUBSEQ VISIT: HCPCS | Performed by: FAMILY MEDICINE

## 2024-05-09 PROCEDURE — 1123F ACP DISCUSS/DSCN MKR DOCD: CPT | Performed by: FAMILY MEDICINE

## 2024-05-09 PROCEDURE — 36415 COLL VENOUS BLD VENIPUNCTURE: CPT | Performed by: FAMILY MEDICINE

## 2024-05-09 SDOH — ECONOMIC STABILITY: INCOME INSECURITY: HOW HARD IS IT FOR YOU TO PAY FOR THE VERY BASICS LIKE FOOD, HOUSING, MEDICAL CARE, AND HEATING?: NOT HARD AT ALL

## 2024-05-09 SDOH — ECONOMIC STABILITY: FOOD INSECURITY: WITHIN THE PAST 12 MONTHS, YOU WORRIED THAT YOUR FOOD WOULD RUN OUT BEFORE YOU GOT MONEY TO BUY MORE.: NEVER TRUE

## 2024-05-09 SDOH — ECONOMIC STABILITY: FOOD INSECURITY: WITHIN THE PAST 12 MONTHS, THE FOOD YOU BOUGHT JUST DIDN'T LAST AND YOU DIDN'T HAVE MONEY TO GET MORE.: NEVER TRUE

## 2024-05-09 ASSESSMENT — PATIENT HEALTH QUESTIONNAIRE - PHQ9
2. FEELING DOWN, DEPRESSED OR HOPELESS: NOT AT ALL
SUM OF ALL RESPONSES TO PHQ QUESTIONS 1-9: 0
SUM OF ALL RESPONSES TO PHQ QUESTIONS 1-9: 0
9. THOUGHTS THAT YOU WOULD BE BETTER OFF DEAD, OR OF HURTING YOURSELF: NOT AT ALL
10. IF YOU CHECKED OFF ANY PROBLEMS, HOW DIFFICULT HAVE THESE PROBLEMS MADE IT FOR YOU TO DO YOUR WORK, TAKE CARE OF THINGS AT HOME, OR GET ALONG WITH OTHER PEOPLE: NOT DIFFICULT AT ALL
4. FEELING TIRED OR HAVING LITTLE ENERGY: NOT AT ALL
6. FEELING BAD ABOUT YOURSELF - OR THAT YOU ARE A FAILURE OR HAVE LET YOURSELF OR YOUR FAMILY DOWN: NOT AT ALL
SUM OF ALL RESPONSES TO PHQ QUESTIONS 1-9: 0
8. MOVING OR SPEAKING SO SLOWLY THAT OTHER PEOPLE COULD HAVE NOTICED. OR THE OPPOSITE, BEING SO FIGETY OR RESTLESS THAT YOU HAVE BEEN MOVING AROUND A LOT MORE THAN USUAL: NOT AT ALL
1. LITTLE INTEREST OR PLEASURE IN DOING THINGS: NOT AT ALL
3. TROUBLE FALLING OR STAYING ASLEEP: NOT AT ALL
5. POOR APPETITE OR OVEREATING: NOT AT ALL
SUM OF ALL RESPONSES TO PHQ QUESTIONS 1-9: 0
7. TROUBLE CONCENTRATING ON THINGS, SUCH AS READING THE NEWSPAPER OR WATCHING TELEVISION: NOT AT ALL
SUM OF ALL RESPONSES TO PHQ9 QUESTIONS 1 & 2: 0

## 2024-05-10 LAB
ALBUMIN SERPL-MCNC: 4.5 G/DL (ref 3.4–5)
ALBUMIN/GLOB SERPL: 1.6 {RATIO} (ref 1.1–2.2)
ALP SERPL-CCNC: 82 U/L (ref 40–129)
ALT SERPL-CCNC: 21 U/L (ref 10–40)
ANION GAP SERPL CALCULATED.3IONS-SCNC: 11 MMOL/L (ref 3–16)
AST SERPL-CCNC: 23 U/L (ref 15–37)
BILIRUB SERPL-MCNC: 0.3 MG/DL (ref 0–1)
BUN SERPL-MCNC: 36 MG/DL (ref 7–20)
CALCIUM SERPL-MCNC: 11.2 MG/DL (ref 8.3–10.6)
CHLORIDE SERPL-SCNC: 105 MMOL/L (ref 99–110)
CHOLEST SERPL-MCNC: 143 MG/DL (ref 0–199)
CO2 SERPL-SCNC: 28 MMOL/L (ref 21–32)
CREAT SERPL-MCNC: 0.9 MG/DL (ref 0.6–1.2)
GFR SERPLBLD CREATININE-BSD FMLA CKD-EPI: 61 ML/MIN/{1.73_M2}
GLUCOSE SERPL-MCNC: 111 MG/DL (ref 70–99)
HDLC SERPL-MCNC: 34 MG/DL (ref 40–60)
LDLC SERPL CALC-MCNC: 73 MG/DL
POTASSIUM SERPL-SCNC: 4.3 MMOL/L (ref 3.5–5.1)
PROT SERPL-MCNC: 7.4 G/DL (ref 6.4–8.2)
SODIUM SERPL-SCNC: 144 MMOL/L (ref 136–145)
TRIGL SERPL-MCNC: 179 MG/DL (ref 0–150)
VLDLC SERPL CALC-MCNC: 36 MG/DL

## 2024-05-10 NOTE — PROGRESS NOTES
Medicare Annual Wellness Visit    Rosie Poole is here for Medicare AWV    Assessment & Plan   Medicare annual wellness visit, subsequent  Recommendations for Preventive Services Due: see orders and patient instructions/AVS.  Recommended screening schedule for the next 5-10 years is provided to the patient in written form: see Patient Instructions/AVS.  Bilateral lower extremity edema  -     Comprehensive Metabolic Panel  Much improved and stable now on torsemide 5 mg daily, which she will continue. Will check CMP to monitor renal function and electrolytes while on loop diuretic.   Primary hypertension  -     Comprehensive Metabolic Panel  -     Lipid Panel  Stable, well controlled. Continue lisinopril 40 mg daily and torsemide 5 mg daily. Will check labs above.   Hyperlipidemia, unspecified hyperlipidemia type  -     Comprehensive Metabolic Panel  -     Lipid Panel  This problem is stable.  Continue Zocor 40 mg nightly until lab results obtain, will adjust dosing if necessary at that time.        Return in 6 months (on 11/9/2024) for Hypertension.     Subjective   The following acute and/or chronic problems were also addressed today:  Bilateral leg edema is much better on torsemide 5 mg daily. She would like to continue on it.     Patient's complete Health Risk Assessment and screening values have been reviewed and are found in Flowsheets. The following problems were reviewed today and where indicated follow up appointments were made and/or referrals ordered.    Positive Risk Factor Screenings with Interventions:    Fall Risk:  Do you feel unsteady or are you worried about falling? : no  2 or more falls in past year?: (!) yes (thanksgiving ER visit)  Fall with injury in past year?: (!) yes     Interventions:    See AVS for additional education material         Controlled Medication Review:      Today's Pain Level: No data recorded   Opioid Risk: (Low risk score <55) Opioid risk score: 25    Patient is low risk

## 2024-05-10 NOTE — PATIENT INSTRUCTIONS
other health problems such as diabetes, high blood pressure, and high cholesterol. If you think you may have a problem with alcohol or drug use, talk to your doctor.   Medicines    Take your medicines exactly as prescribed. Call your doctor if you think you are having a problem with your medicine.     If your doctor recommends aspirin, take the amount directed each day. Make sure you take aspirin and not another kind of pain reliever, such as acetaminophen (Tylenol).   When should you call for help?   Call 911 if you have symptoms of a heart attack. These may include:    Chest pain or pressure, or a strange feeling in the chest.     Sweating.     Shortness of breath.     Pain, pressure, or a strange feeling in the back, neck, jaw, or upper belly or in one or both shoulders or arms.     Lightheadedness or sudden weakness.     A fast or irregular heartbeat.   After you call 911, the  may tell you to chew 1 adult-strength or 2 to 4 low-dose aspirin. Wait for an ambulance. Do not try to drive yourself.  Watch closely for changes in your health, and be sure to contact your doctor if you have any problems.  Where can you learn more?  Go to https://www.[employer].net/patientEd and enter F075 to learn more about \"A Healthy Heart: Care Instructions.\"  Current as of: June 24, 2023               Content Version: 14.0  © 7514-6508 [employer].   Care instructions adapted under license by [employer]. If you have questions about a medical condition or this instruction, always ask your healthcare professional. [employer] disclaims any warranty or liability for your use of this information.      Personalized Preventive Plan for Rosie Poole - 5/9/2024  Medicare offers a range of preventive health benefits. Some of the tests and screenings are paid in full while other may be subject to a deductible, co-insurance, and/or copay.    Some of these benefits include a comprehensive review of your

## 2024-05-17 DIAGNOSIS — R60.0 BILATERAL LOWER EXTREMITY EDEMA: ICD-10-CM

## 2024-05-17 RX ORDER — TORSEMIDE 5 MG/1
5 TABLET ORAL DAILY
Qty: 90 TABLET | Refills: 1 | Status: SHIPPED | OUTPATIENT
Start: 2024-05-17

## 2024-05-17 NOTE — TELEPHONE ENCOUNTER
Refill Request     CONFIRM preferred pharmacy with the patient.    If Mail Order Rx - Pend for 90 day refill.      Last Seen: Last Seen Department: 5/9/2024  Last Seen by PCP: 5/9/2024    Last Written: 4/25/24 30 tab 0 refills    If no future appointment scheduled:  Review the last OV with PCP and review information for follow-up visit,  Route STAFF MESSAGE with patient name to the  Pool for scheduling with the following information:            -  Timing of next visit           -  Visit type ie Physical, OV, etc           -  Diagnoses/Reason ie. COPD, HTN - Do not use MEDICATION, Follow-up or CHECK UP - Give reason for visit      Next Appointment:   Future Appointments   Date Time Provider Department Center   7/3/2024  1:40 PM Carmita Rowe APRN - CNP [facility]       Message sent to  to schedule appt with patient?  YES    Return in 6 months (on 11/9/2024) for Hypertension.      Requested Prescriptions     Pending Prescriptions Disp Refills    torsemide (DEMADEX) 5 MG tablet [Pharmacy Med Name: TORSEMIDE  5MG  TAB] 30 tablet 11     Sig: TAKE 1 TABLET BY MOUTH DAILY

## 2024-05-17 NOTE — TELEPHONE ENCOUNTER
Patient said she would need to wait for her daughter to get home from work to call back for scheduling

## 2024-06-05 ENCOUNTER — OFFICE VISIT (OUTPATIENT)
Dept: FAMILY MEDICINE CLINIC | Age: 89
End: 2024-06-05
Payer: MEDICARE

## 2024-06-05 VITALS
TEMPERATURE: 97.8 F | BODY MASS INDEX: 28.82 KG/M2 | HEART RATE: 65 BPM | HEIGHT: 65 IN | SYSTOLIC BLOOD PRESSURE: 142 MMHG | DIASTOLIC BLOOD PRESSURE: 68 MMHG | OXYGEN SATURATION: 97 % | WEIGHT: 173 LBS

## 2024-06-05 DIAGNOSIS — Z01.818 PRE-OP EXAM: Primary | ICD-10-CM

## 2024-06-05 DIAGNOSIS — Z01.818 PRE-OP EXAM: ICD-10-CM

## 2024-06-05 LAB
ALBUMIN SERPL-MCNC: 4.5 G/DL (ref 3.4–5)
ALBUMIN/GLOB SERPL: 1.6 {RATIO} (ref 1.1–2.2)
ALP SERPL-CCNC: 85 U/L (ref 40–129)
ALT SERPL-CCNC: 28 U/L (ref 10–40)
ANION GAP SERPL CALCULATED.3IONS-SCNC: 11 MMOL/L (ref 3–16)
AST SERPL-CCNC: 28 U/L (ref 15–37)
BASOPHILS # BLD: 0.1 K/UL (ref 0–0.2)
BASOPHILS NFR BLD: 0.5 %
BILIRUB SERPL-MCNC: 0.3 MG/DL (ref 0–1)
BUN SERPL-MCNC: 32 MG/DL (ref 7–20)
CALCIUM SERPL-MCNC: 11 MG/DL (ref 8.3–10.6)
CHLORIDE SERPL-SCNC: 104 MMOL/L (ref 99–110)
CO2 SERPL-SCNC: 28 MMOL/L (ref 21–32)
CREAT SERPL-MCNC: 0.7 MG/DL (ref 0.6–1.2)
DEPRECATED RDW RBC AUTO: 14.6 % (ref 12.4–15.4)
EOSINOPHIL # BLD: 0.2 K/UL (ref 0–0.6)
EOSINOPHIL NFR BLD: 1.3 %
GFR SERPLBLD CREATININE-BSD FMLA CKD-EPI: 82 ML/MIN/{1.73_M2}
GLUCOSE SERPL-MCNC: 103 MG/DL (ref 70–99)
HCT VFR BLD AUTO: 33.8 % (ref 36–48)
HGB BLD-MCNC: 11.5 G/DL (ref 12–16)
LYMPHOCYTES # BLD: 1.6 K/UL (ref 1–5.1)
LYMPHOCYTES NFR BLD: 12.8 %
MCH RBC QN AUTO: 30.5 PG (ref 26–34)
MCHC RBC AUTO-ENTMCNC: 34 G/DL (ref 31–36)
MCV RBC AUTO: 89.9 FL (ref 80–100)
MONOCYTES # BLD: 0.7 K/UL (ref 0–1.3)
MONOCYTES NFR BLD: 5.6 %
NEUTROPHILS # BLD: 9.8 K/UL (ref 1.7–7.7)
NEUTROPHILS NFR BLD: 79.8 %
PLATELET # BLD AUTO: 197 K/UL (ref 135–450)
PMV BLD AUTO: 8.9 FL (ref 5–10.5)
POTASSIUM SERPL-SCNC: 4.7 MMOL/L (ref 3.5–5.1)
PROT SERPL-MCNC: 7.4 G/DL (ref 6.4–8.2)
RBC # BLD AUTO: 3.75 M/UL (ref 4–5.2)
SODIUM SERPL-SCNC: 143 MMOL/L (ref 136–145)
WBC # BLD AUTO: 12.3 K/UL (ref 4–11)

## 2024-06-05 PROCEDURE — G8427 DOCREV CUR MEDS BY ELIG CLIN: HCPCS | Performed by: NURSE PRACTITIONER

## 2024-06-05 PROCEDURE — 1090F PRES/ABSN URINE INCON ASSESS: CPT | Performed by: NURSE PRACTITIONER

## 2024-06-05 PROCEDURE — 1036F TOBACCO NON-USER: CPT | Performed by: NURSE PRACTITIONER

## 2024-06-05 PROCEDURE — 1123F ACP DISCUSS/DSCN MKR DOCD: CPT | Performed by: NURSE PRACTITIONER

## 2024-06-05 PROCEDURE — G8417 CALC BMI ABV UP PARAM F/U: HCPCS | Performed by: NURSE PRACTITIONER

## 2024-06-05 PROCEDURE — 93000 ELECTROCARDIOGRAM COMPLETE: CPT | Performed by: NURSE PRACTITIONER

## 2024-06-05 PROCEDURE — 99214 OFFICE O/P EST MOD 30 MIN: CPT | Performed by: NURSE PRACTITIONER

## 2024-06-05 NOTE — PROGRESS NOTES
06/05/2024    CREATININE 0.7 06/05/2024    GLUCOSE 103 (H) 06/05/2024    CALCIUM 11.0 (H) 06/05/2024    PROT 7.5 02/29/2012    BILITOT 0.3 06/05/2024    ALKPHOS 85 06/05/2024    AST 28 06/05/2024    ALT 28 06/05/2024    LABGLOM 82 06/05/2024    GFRAA >60 09/20/2021    AGRATIO 1.6 06/05/2024    GLOB 2.8 09/20/2021                   Assessment:        89 y.o. female with planned surgery as above.    Known risk factors for perioperative complications: Anemia        Cardiac Risk Estimation: per the Revised Cardiac Risk Index (Circ. 100:1043, 1999), RCI RISK CLASS I (0 risk factors, risk of major cardiac compl. appr. 0.5%)    Current medications which may produce withdrawal symptoms if withheld perioperatively: Percocet      Plan:      1. Preoperative workup as follows ECG, hemoglobin, hematocrit, electrolytes, creatinine, glucose, liver function studies  2. Change in medication regimen before surgery: Take lisinopril and torsemide 1 day up to surgery and not including day of surgery; May take percocet, simvastatin, and metoprolol up to surgery and including day of surgery  3. Prophylaxis for cardiac events with perioperative beta-blockers: already on lopressor  4. Deep vein thrombosis prophylaxis postoperatively:regimen to be chosen by surgical team    Cleared for surgery if lab work (CBC and CMP) comes back normal

## 2024-06-10 ENCOUNTER — TELEPHONE (OUTPATIENT)
Dept: FAMILY MEDICINE CLINIC | Age: 89
End: 2024-06-10

## 2024-06-10 DIAGNOSIS — Z01.818 PREOP TESTING: Primary | ICD-10-CM

## 2024-06-10 DIAGNOSIS — E83.52 SERUM CALCIUM ELEVATED: ICD-10-CM

## 2024-06-10 NOTE — TELEPHONE ENCOUNTER
Received phone call from Promise borges in regards to see if pts lab work has returned and if she is cleared for surgery please advise    Promise- [phone]

## 2024-06-11 DIAGNOSIS — E83.52 SERUM CALCIUM ELEVATED: ICD-10-CM

## 2024-06-11 DIAGNOSIS — Z01.818 PREOP TESTING: ICD-10-CM

## 2024-06-11 LAB
BASOPHILS # BLD: 0 K/UL (ref 0–0.2)
BASOPHILS NFR BLD: 0.5 %
DEPRECATED RDW RBC AUTO: 14.4 % (ref 12.4–15.4)
EOSINOPHIL # BLD: 0.3 K/UL (ref 0–0.6)
EOSINOPHIL NFR BLD: 2.6 %
HCT VFR BLD AUTO: 34 % (ref 36–48)
HGB BLD-MCNC: 11.8 G/DL (ref 12–16)
LYMPHOCYTES # BLD: 1.6 K/UL (ref 1–5.1)
LYMPHOCYTES NFR BLD: 16 %
MCH RBC QN AUTO: 30.8 PG (ref 26–34)
MCHC RBC AUTO-ENTMCNC: 34.7 G/DL (ref 31–36)
MCV RBC AUTO: 88.8 FL (ref 80–100)
MONOCYTES # BLD: 0.6 K/UL (ref 0–1.3)
MONOCYTES NFR BLD: 5.9 %
NEUTROPHILS # BLD: 7.4 K/UL (ref 1.7–7.7)
NEUTROPHILS NFR BLD: 75 %
PLATELET # BLD AUTO: 223 K/UL (ref 135–450)
PMV BLD AUTO: 8.7 FL (ref 5–10.5)
RBC # BLD AUTO: 3.83 M/UL (ref 4–5.2)
WBC # BLD AUTO: 9.8 K/UL (ref 4–11)

## 2024-06-11 NOTE — TELEPHONE ENCOUNTER
Spoke with Patricia at [facility] and relayed Dante's message about the patient needing to repeat a CBC prior to being cleared for surgery. Patricia requested that we fax over the pre-op note as well as the labs that were done on 6/05/2024. Fax number [phone].     Patient has been informed of dante's message and verbalized good understanding.

## 2024-06-12 LAB
CA-I ADJ PH7.4 SERPL-SCNC: 1.38 MMOL/L (ref 1.09–1.3)
CA-I SERPL ISE-SCNC: 1.34 MMOL/L (ref 1.09–1.3)

## 2024-06-12 NOTE — TELEPHONE ENCOUNTER
Spoke to Dante, pt is cleared for surgery as her WBC is within normal range. Dante will sign the note and then I will fax it over.

## 2024-06-12 NOTE — TELEPHONE ENCOUNTER
Patient's daughter is calling to see if the patient is cleared for left hand surgery that is scheduled for tomorrow.

## 2024-07-11 DIAGNOSIS — I10 ESSENTIAL HYPERTENSION: ICD-10-CM

## 2024-07-11 DIAGNOSIS — E78.5 HYPERLIPIDEMIA, UNSPECIFIED HYPERLIPIDEMIA TYPE: ICD-10-CM

## 2024-07-11 DIAGNOSIS — I10 PRIMARY HYPERTENSION: ICD-10-CM

## 2024-07-12 RX ORDER — ALBUTEROL SULFATE 90 UG/1
AEROSOL, METERED RESPIRATORY (INHALATION)
Qty: 51 G | Refills: 1 | Status: SHIPPED | OUTPATIENT
Start: 2024-07-12

## 2024-07-12 RX ORDER — LISINOPRIL 40 MG/1
40 TABLET ORAL DAILY
Qty: 90 TABLET | Refills: 1 | Status: SHIPPED | OUTPATIENT
Start: 2024-07-12

## 2024-07-12 RX ORDER — SIMVASTATIN 40 MG
40 TABLET ORAL NIGHTLY
Qty: 90 TABLET | Refills: 1 | Status: SHIPPED | OUTPATIENT
Start: 2024-07-12

## 2024-07-12 NOTE — TELEPHONE ENCOUNTER
Refill Request     CONFIRM preferred pharmacy with the patient.    If Mail Order Rx - Pend for 90 day refill.      Last Seen: Last Seen Department: 6/5/2024  Last Seen by PCP: 5/9/2024    Last Written: albuterol 5/1/24 54 with 1 refill     Lisinopril 2/7/24 90 with 1 refill     Metoprolol 2/7/24 180 with 1 refill     Simvastatin 2/7/24 90 with 1 refill     If no future appointment scheduled:  Review the last OV with PCP and review information for follow-up visit,  Route STAFF MESSAGE with patient name to the  Pool for scheduling with the following information:            -  Timing of next visit           -  Visit type ie Physical, OV, etc           -  Diagnoses/Reason ie. COPD, HTN - Do not use MEDICATION, Follow-up or CHECK UP - Give reason for visit      Next Appointment:   Future Appointments   Date Time Provider Department Center   8/29/2024  9:10 AM Carmita Rowe APRN - CNP [facility]       Message sent to  to schedule appt with patient?  YES Return in 6 months (on 11/9/2024) for Hypertension.       Requested Prescriptions     Pending Prescriptions Disp Refills    lisinopril (PRINIVIL;ZESTRIL) 40 MG tablet [Pharmacy Med Name: Lisinopril 40 MG Oral Tablet] 90 tablet 3     Sig: TAKE 1 TABLET BY MOUTH DAILY    metoprolol tartrate (LOPRESSOR) 25 MG tablet [Pharmacy Med Name: Metoprolol Tartrate 25 MG Oral Tablet] 180 tablet 3     Sig: TAKE 1 TABLET BY MOUTH TWICE  DAILY    albuterol sulfate HFA (PROVENTIL;VENTOLIN;PROAIR) 108 (90 Base) MCG/ACT inhaler [Pharmacy Med Name: ALBUTEROL HFA 90MCG/ACT (PA)] 51 g 3     Sig: USE 1 TO 2 INHALATIONS BY MOUTH  INTO THE LUNGS EVERY 4 HOURS AS  NEEDED FOR WHEEZING OR SHORTNESS OF BREATH    simvastatin (ZOCOR) 40 MG tablet [Pharmacy Med Name: Simvastatin 40 MG Oral Tablet] 90 tablet 3     Sig: TAKE 1 TABLET BY MOUTH EVERY  NIGHT

## 2024-08-27 NOTE — TELEPHONE ENCOUNTER
Refill Request     CONFIRM preferred pharmacy with the patient.    If Mail Order Rx - Pend for 90 day refill.      Last Seen: Last Seen Department: 6/5/2024  Last Seen by PCP: 5/9/2024    Last Written: 4/12/24    If no future appointment scheduled:  Review the last OV with PCP and review information for follow-up visit,  Route STAFF MESSAGE with patient name to the  Pool for scheduling with the following information:            -  Timing of next visit           -  Visit type ie Physical, OV, etc           -  Diagnoses/Reason ie. COPD, HTN - Do not use MEDICATION, Follow-up or CHECK UP - Give reason for visit      Next Appointment:   Future Appointments   Date Time Provider Department Center   8/29/2024  9:10 AM Carmita Rowe APRN - CNP [facility]   11/15/2024 10:00 AM Shobha Mckeon MD [facility]       Message sent to  to schedule appt with patient?  NO      Requested Prescriptions     Pending Prescriptions Disp Refills    fluticasone (FLONASE) 50 MCG/ACT nasal spray [Pharmacy Med Name: Fluticasone Propionate 50 MCG/ACT Nasal Suspension] 48 g 1     Sig: USE 2 SPRAYS NASALLY DAILY

## 2024-08-29 RX ORDER — FLUTICASONE PROPIONATE 50 MCG
SPRAY, SUSPENSION (ML) NASAL
Qty: 48 G | Refills: 1 | Status: SHIPPED | OUTPATIENT
Start: 2024-08-29

## 2024-11-15 ENCOUNTER — OFFICE VISIT (OUTPATIENT)
Dept: FAMILY MEDICINE CLINIC | Age: 89
End: 2024-11-15

## 2024-11-15 VITALS
SYSTOLIC BLOOD PRESSURE: 120 MMHG | HEIGHT: 65 IN | OXYGEN SATURATION: 98 % | WEIGHT: 178 LBS | HEART RATE: 64 BPM | TEMPERATURE: 97 F | DIASTOLIC BLOOD PRESSURE: 62 MMHG | BODY MASS INDEX: 29.66 KG/M2

## 2024-11-15 DIAGNOSIS — Z23 NEED FOR INFLUENZA VACCINATION: ICD-10-CM

## 2024-11-15 DIAGNOSIS — I10 PRIMARY HYPERTENSION: Primary | Chronic | ICD-10-CM

## 2024-11-15 DIAGNOSIS — E21.3 HYPERPARATHYROIDISM (HCC): ICD-10-CM

## 2024-11-15 DIAGNOSIS — E78.5 HYPERLIPIDEMIA, UNSPECIFIED HYPERLIPIDEMIA TYPE: Chronic | ICD-10-CM

## 2024-11-15 RX ORDER — ALENDRONATE SODIUM 70 MG/1
70 TABLET ORAL
COMMUNITY

## 2024-11-15 SDOH — ECONOMIC STABILITY: FOOD INSECURITY: WITHIN THE PAST 12 MONTHS, YOU WORRIED THAT YOUR FOOD WOULD RUN OUT BEFORE YOU GOT MONEY TO BUY MORE.: NEVER TRUE

## 2024-11-15 SDOH — ECONOMIC STABILITY: FOOD INSECURITY: WITHIN THE PAST 12 MONTHS, THE FOOD YOU BOUGHT JUST DIDN'T LAST AND YOU DIDN'T HAVE MONEY TO GET MORE.: NEVER TRUE

## 2024-11-15 SDOH — ECONOMIC STABILITY: INCOME INSECURITY: HOW HARD IS IT FOR YOU TO PAY FOR THE VERY BASICS LIKE FOOD, HOUSING, MEDICAL CARE, AND HEATING?: NOT HARD AT ALL

## 2024-11-17 NOTE — ASSESSMENT & PLAN NOTE
Chronic, not at goal (unstable), level increasing. She already has follow up with endocrinology. We discuss surgical consultation, but she declines. She will continue on the Fosamax.

## 2024-11-17 NOTE — PROGRESS NOTES
Rosie Poole (:  1934) is a 89 y.o. female,Established patient, here for evaluation of the following chief complaint(s):  Hypertension (Check up /) and Hyperlipidemia         Assessment & Plan  Primary hypertension   Chronic, at goal (stable), continue lisinopril 40 mg daily, torsemide 5 mg daily, and metoprolol 25 mg BID.          Need for influenza vaccination   Flu shot given today.     Orders:    Influenza, FLUAD Trivalent, (age 65 y+), IM, Preservative Free, 0.5mL    Hyperparathyroidism (HCC)   Chronic, not at goal (unstable), level increasing. She already has follow up with endocrinology. We discuss surgical consultation, but she declines. She will continue on the Fosamax.          Hyperlipidemia, unspecified hyperlipidemia type   Chronic, at goal (stable), continue Zocor 40 mg nightly. Will be due for repeat lipid panel and LFTs in 6 months.            Return in about 6 months (around 5/15/2025) for Medicare annual wellness visit, fasting labs.       Subjective   Chief Complaint   Patient presents with    Hypertension     Check up       Hyperlipidemia       HPI Treatment Adherence:   Medication compliance:  compliant all of the time  Diet compliance:  compliant most of the time  Weight trend: stable  Current exercise: no regular exercise  Barriers: impairment:  back problems    Hypertension:  Home blood pressure monitoring: No.  She is adherent to a low sodium diet. Patient denies chest pain and shortness of breath.  Antihypertensive medication side effects: no medication side effects noted.  Use of agents associated with hypertension: none.                                        Sodium (mmol/L)   Date Value   2024 143    BUN (mg/dL)   Date Value   2024 16    Glucose (mg/dL)   Date Value   2024 117 (H)   10/11/2023 89      Potassium (mmol/L)   Date Value   2024 4.6     Potassium reflex Magnesium (mmol/L)   Date Value   2023 4.3    Creatinine (mg/dL)   Date Value

## 2024-11-20 NOTE — ASSESSMENT & PLAN NOTE
Chronic, at goal (stable), continue Zocor 40 mg nightly. Will be due for repeat lipid panel and LFTs in 6 months.

## 2024-11-20 NOTE — ASSESSMENT & PLAN NOTE
Chronic, at goal (stable), continue lisinopril 40 mg daily, torsemide 5 mg daily, and metoprolol 25 mg BID.

## 2024-12-05 ENCOUNTER — OFFICE VISIT (OUTPATIENT)
Dept: FAMILY MEDICINE CLINIC | Age: 88
End: 2024-12-05

## 2024-12-05 VITALS
BODY MASS INDEX: 29.66 KG/M2 | RESPIRATION RATE: 16 BRPM | HEART RATE: 72 BPM | SYSTOLIC BLOOD PRESSURE: 118 MMHG | WEIGHT: 178 LBS | DIASTOLIC BLOOD PRESSURE: 78 MMHG | HEIGHT: 65 IN | OXYGEN SATURATION: 97 %

## 2024-12-05 DIAGNOSIS — J01.00 ACUTE NON-RECURRENT MAXILLARY SINUSITIS: Primary | ICD-10-CM

## 2024-12-05 LAB
INFLUENZA A ANTIBODY: NEGATIVE
INFLUENZA B ANTIBODY: NEGATIVE

## 2024-12-05 ASSESSMENT — ENCOUNTER SYMPTOMS
GASTROINTESTINAL NEGATIVE: 1
NAUSEA: 0
VOICE CHANGE: 0
DIARRHEA: 0
CONSTIPATION: 0
TROUBLE SWALLOWING: 0
ABDOMINAL DISTENTION: 0
BLOOD IN STOOL: 0
RHINORRHEA: 0
COUGH: 1
SINUS PRESSURE: 1
FACIAL SWELLING: 0
SHORTNESS OF BREATH: 0
SINUS PAIN: 1
COLOR CHANGE: 0
ABDOMINAL PAIN: 0
SORE THROAT: 0
WHEEZING: 0
CHEST TIGHTNESS: 0
VOMITING: 0

## 2024-12-05 NOTE — PROGRESS NOTES
Inhale 1-2 puffs into the lungs every 4 hours as needed for Wheezing or Shortness of Breath 3 each 3    Biotin 1000 MCG TABS Take 1,000 mcg by mouth daily      estradiol (ESTRACE) 0.1 MG/GM vaginal cream Monday , Wednesday and Friday      Handicap Placard MISC by Does not apply route 2 each 0    Vibegron (GEMTESA) 75 MG TABS Take 1 tablet by mouth daily      Blood Pressure KIT Check blood pressures and bring log to next visit 1 kit 0    aspirin 81 MG tablet Take 1 tablet by mouth daily      Multiple Vitamins-Minerals (MULTI FOR HER PO) Take  by mouth.        Blood Pressure KIT 1 kit by Does not apply route once for 1 dose 1 kit 0     No current facility-administered medications on file prior to visit.        Allergies   Allergen Reactions    Cipro Xr Hives    Clindamycin/Lincomycin Hives       Past Medical History:   Diagnosis Date    Allergic rhinitis     Arthritis     Bladder dysfunction     overactive    Blood transfusion     after surgery 2008, 2012    Cancer (HCC)     skin cancer above rt eye excised    Cervical myelopathy (HCC) 02/03/2012    Cervical stenosis of spine     Chronic constipation     Constipation, other cause     due to pain medication    Dental disease     GERD (gastroesophageal reflux disease)     controlled w/diet    Hearing loss     Hypercalcemia     Hyperlipidemia     Hyperparathyroidism (HCC)     Hypertension     Incontinence of urine     Lumbar stenosis with neurogenic claudication     Nocturia     Osteopenia     SCC (squamous cell carcinoma)     right side of the face    Sinus congestion     Skin abnormality 08/2012    skin peeling right medial foot/heel improving with OTC antifungal cream    Sleep apnea     cpap does not use    Wears dentures     partial           PE  Vitals:    12/05/24 1502   BP: 118/78   Site: Right Upper Arm   Position: Sitting   Cuff Size: Medium Adult   Pulse: 72   Resp: 16   SpO2: 97%   Weight: 80.7 kg (178 lb)   Height: 1.651 m (5' 5\")     Estimated body mass

## 2024-12-06 LAB — SARS-COV-2 N GENE RESP QL NAA+PROBE: NOT DETECTED

## 2025-01-09 DIAGNOSIS — I10 PRIMARY HYPERTENSION: ICD-10-CM

## 2025-01-09 DIAGNOSIS — E21.3 HYPERPARATHYROIDISM (HCC): ICD-10-CM

## 2025-01-09 DIAGNOSIS — E78.5 HYPERLIPIDEMIA, UNSPECIFIED HYPERLIPIDEMIA TYPE: ICD-10-CM

## 2025-01-09 DIAGNOSIS — R60.0 BILATERAL LOWER EXTREMITY EDEMA: ICD-10-CM

## 2025-01-09 DIAGNOSIS — I10 ESSENTIAL HYPERTENSION: ICD-10-CM

## 2025-01-09 RX ORDER — SIMVASTATIN 40 MG
40 TABLET ORAL NIGHTLY
Qty: 90 TABLET | Refills: 1 | Status: SHIPPED | OUTPATIENT
Start: 2025-01-09 | End: 2025-01-10 | Stop reason: SDUPTHER

## 2025-01-09 RX ORDER — ALBUTEROL SULFATE 90 UG/1
1-2 INHALANT RESPIRATORY (INHALATION) EVERY 4 HOURS PRN
Qty: 51 G | Refills: 1 | Status: SHIPPED | OUTPATIENT
Start: 2025-01-09 | End: 2025-01-10 | Stop reason: SDUPTHER

## 2025-01-09 RX ORDER — FLUTICASONE PROPIONATE 50 MCG
SPRAY, SUSPENSION (ML) NASAL
Qty: 48 G | Refills: 1 | Status: SHIPPED | OUTPATIENT
Start: 2025-01-09 | End: 2025-01-10 | Stop reason: SDUPTHER

## 2025-01-09 RX ORDER — METOPROLOL TARTRATE 25 MG/1
25 TABLET, FILM COATED ORAL 2 TIMES DAILY
Qty: 180 TABLET | Refills: 1 | Status: SHIPPED | OUTPATIENT
Start: 2025-01-09 | End: 2025-01-10 | Stop reason: SDUPTHER

## 2025-01-09 RX ORDER — LISINOPRIL 40 MG/1
40 TABLET ORAL DAILY
Qty: 90 TABLET | Refills: 1 | Status: SHIPPED | OUTPATIENT
Start: 2025-01-09 | End: 2025-01-10 | Stop reason: SDUPTHER

## 2025-01-09 RX ORDER — TORSEMIDE 5 MG/1
5 TABLET ORAL DAILY
Qty: 90 TABLET | Refills: 1 | Status: SHIPPED | OUTPATIENT
Start: 2025-01-09 | End: 2025-01-10 | Stop reason: SDUPTHER

## 2025-01-09 RX ORDER — ALENDRONATE SODIUM 70 MG/1
70 TABLET ORAL
Qty: 4 TABLET | OUTPATIENT
Start: 2025-01-09

## 2025-01-09 NOTE — TELEPHONE ENCOUNTER
Refill Request     CONFIRM preferred pharmacy with the patient.    If Mail Order Rx - Pend for 90 day refill.      Last Seen: Last Seen Department: 12/5/2024  Last Seen by PCP: Visit date not found    Last Written: 07/12/24 90 with 0 refills    If no future appointment scheduled:  Review the last OV with PCP and review information for follow-up visit,  Route STAFF MESSAGE with patient name to the  Pool for scheduling with the following information:            -  Timing of next visit           -  Visit type ie Physical, OV, etc           -  Diagnoses/Reason ie. COPD, HTN - Do not use MEDICATION, Follow-up or CHECK UP - Give reason for visit      Next Appointment:   Future Appointments   Date Time Provider Department Center   1/21/2025  9:30 AM Garrett Grullon MD [facility]   5/15/2025  9:00 AM Shobha Mckeon MD [facility]       Message sent to  to schedule appt with patient?  NO      Requested Prescriptions      No prescriptions requested or ordered in this encounter

## 2025-01-10 DIAGNOSIS — I10 PRIMARY HYPERTENSION: ICD-10-CM

## 2025-01-10 DIAGNOSIS — R60.0 BILATERAL LOWER EXTREMITY EDEMA: ICD-10-CM

## 2025-01-10 DIAGNOSIS — I10 ESSENTIAL HYPERTENSION: ICD-10-CM

## 2025-01-10 DIAGNOSIS — E78.5 HYPERLIPIDEMIA, UNSPECIFIED HYPERLIPIDEMIA TYPE: ICD-10-CM

## 2025-01-10 RX ORDER — LISINOPRIL 40 MG/1
40 TABLET ORAL DAILY
Qty: 90 TABLET | Refills: 1 | Status: SHIPPED | OUTPATIENT
Start: 2025-01-10

## 2025-01-10 RX ORDER — FLUTICASONE PROPIONATE 50 MCG
SPRAY, SUSPENSION (ML) NASAL
Qty: 48 G | Refills: 1 | Status: SHIPPED | OUTPATIENT
Start: 2025-01-10

## 2025-01-10 RX ORDER — METOPROLOL TARTRATE 25 MG/1
25 TABLET, FILM COATED ORAL 2 TIMES DAILY
Qty: 180 TABLET | Refills: 1 | Status: SHIPPED | OUTPATIENT
Start: 2025-01-10

## 2025-01-10 RX ORDER — ALBUTEROL SULFATE 90 UG/1
1-2 INHALANT RESPIRATORY (INHALATION) EVERY 4 HOURS PRN
Qty: 51 G | Refills: 1 | Status: SHIPPED | OUTPATIENT
Start: 2025-01-10

## 2025-01-10 RX ORDER — SIMVASTATIN 40 MG
40 TABLET ORAL NIGHTLY
Qty: 90 TABLET | Refills: 1 | Status: SHIPPED | OUTPATIENT
Start: 2025-01-10

## 2025-01-10 RX ORDER — TORSEMIDE 5 MG/1
5 TABLET ORAL DAILY
Qty: 90 TABLET | Refills: 1 | Status: SHIPPED | OUTPATIENT
Start: 2025-01-10

## 2025-01-10 NOTE — TELEPHONE ENCOUNTER
Patient is asking for this to be sent to mail order as they were sent to local pharmacy instead pended below

## 2025-01-21 PROBLEM — M96.1 POSTLAMINECTOMY SYNDROME: Status: ACTIVE | Noted: 2024-04-24

## 2025-05-14 NOTE — ED NOTES
Ambulated patient 50 feet with walker. During ambulation she had one episode of pain that immediately went away and she stated that it was normal pain not the pain that brought her here. When trying to get her back in bed she did experience the pain again and Dr. Ramiro Dockery was there to witness it. Columba Starks, LAVON Bynum RN  11/03/23 1024 Imaging Studies

## 2025-05-15 ENCOUNTER — OFFICE VISIT (OUTPATIENT)
Dept: FAMILY MEDICINE CLINIC | Age: 89
End: 2025-05-15
Payer: MEDICARE

## 2025-05-15 VITALS
WEIGHT: 183 LBS | DIASTOLIC BLOOD PRESSURE: 62 MMHG | OXYGEN SATURATION: 94 % | BODY MASS INDEX: 30.49 KG/M2 | SYSTOLIC BLOOD PRESSURE: 130 MMHG | TEMPERATURE: 97 F | HEART RATE: 77 BPM | HEIGHT: 65 IN

## 2025-05-15 DIAGNOSIS — E21.3 HYPERPARATHYROIDISM: ICD-10-CM

## 2025-05-15 DIAGNOSIS — R73.09 ELEVATED GLUCOSE: ICD-10-CM

## 2025-05-15 DIAGNOSIS — E78.2 MIXED HYPERLIPIDEMIA: Chronic | ICD-10-CM

## 2025-05-15 DIAGNOSIS — R41.89 COGNITIVE IMPAIRMENT: ICD-10-CM

## 2025-05-15 DIAGNOSIS — Z00.00 MEDICARE ANNUAL WELLNESS VISIT, SUBSEQUENT: Primary | ICD-10-CM

## 2025-05-15 LAB
25(OH)D3 SERPL-MCNC: 29.9 NG/ML
ALBUMIN SERPL-MCNC: 4.4 G/DL (ref 3.4–5)
ALBUMIN/GLOB SERPL: 1.6 {RATIO} (ref 1.1–2.2)
ALP SERPL-CCNC: 65 U/L (ref 40–129)
ALT SERPL-CCNC: 25 U/L (ref 10–40)
ANION GAP SERPL CALCULATED.3IONS-SCNC: 10 MMOL/L (ref 3–16)
AST SERPL-CCNC: 26 U/L (ref 15–37)
BASOPHILS # BLD: 0 K/UL (ref 0–0.2)
BASOPHILS NFR BLD: 0.5 %
BILIRUB SERPL-MCNC: 0.5 MG/DL (ref 0–1)
BUN SERPL-MCNC: 23 MG/DL (ref 7–20)
CALCIUM SERPL-MCNC: 10.7 MG/DL (ref 8.3–10.6)
CHLORIDE SERPL-SCNC: 107 MMOL/L (ref 99–110)
CHOLEST SERPL-MCNC: 133 MG/DL (ref 0–199)
CO2 SERPL-SCNC: 26 MMOL/L (ref 21–32)
CREAT SERPL-MCNC: 0.8 MG/DL (ref 0.6–1.2)
DEPRECATED RDW RBC AUTO: 14 % (ref 12.4–15.4)
EOSINOPHIL # BLD: 0.2 K/UL (ref 0–0.6)
EOSINOPHIL NFR BLD: 2.8 %
EST. AVERAGE GLUCOSE BLD GHB EST-MCNC: 105.4 MG/DL
FOLATE SERPL-MCNC: >40 NG/ML (ref 4.78–24.2)
GFR SERPLBLD CREATININE-BSD FMLA CKD-EPI: 70 ML/MIN/{1.73_M2}
GLUCOSE SERPL-MCNC: 118 MG/DL (ref 70–99)
HBA1C MFR BLD: 5.3 %
HCT VFR BLD AUTO: 36.7 % (ref 36–48)
HDLC SERPL-MCNC: 36 MG/DL (ref 40–60)
HGB BLD-MCNC: 12.3 G/DL (ref 12–16)
LDLC SERPL CALC-MCNC: 72 MG/DL
LYMPHOCYTES # BLD: 1.6 K/UL (ref 1–5.1)
LYMPHOCYTES NFR BLD: 19.5 %
MAGNESIUM SERPL-MCNC: 1.71 MG/DL (ref 1.8–2.4)
MCH RBC QN AUTO: 30.3 PG (ref 26–34)
MCHC RBC AUTO-ENTMCNC: 33.4 G/DL (ref 31–36)
MCV RBC AUTO: 90.7 FL (ref 80–100)
MONOCYTES # BLD: 0.5 K/UL (ref 0–1.3)
MONOCYTES NFR BLD: 5.9 %
NEUTROPHILS # BLD: 5.7 K/UL (ref 1.7–7.7)
NEUTROPHILS NFR BLD: 71.3 %
PHOSPHATE SERPL-MCNC: 3.1 MG/DL (ref 2.5–4.9)
PLATELET # BLD AUTO: 228 K/UL (ref 135–450)
PMV BLD AUTO: 9.1 FL (ref 5–10.5)
POTASSIUM SERPL-SCNC: 4.6 MMOL/L (ref 3.5–5.1)
PROT SERPL-MCNC: 7.1 G/DL (ref 6.4–8.2)
PTH-INTACT SERPL-MCNC: 97.8 PG/ML (ref 14–72)
RBC # BLD AUTO: 4.05 M/UL (ref 4–5.2)
SODIUM SERPL-SCNC: 143 MMOL/L (ref 136–145)
TRIGL SERPL-MCNC: 123 MG/DL (ref 0–150)
TSH SERPL DL<=0.005 MIU/L-ACNC: 1.45 UIU/ML (ref 0.27–4.2)
VIT B12 SERPL-MCNC: 2361 PG/ML (ref 211–911)
VLDLC SERPL CALC-MCNC: 25 MG/DL
WBC # BLD AUTO: 8 K/UL (ref 4–11)

## 2025-05-15 PROCEDURE — 1123F ACP DISCUSS/DSCN MKR DOCD: CPT | Performed by: FAMILY MEDICINE

## 2025-05-15 PROCEDURE — 1160F RVW MEDS BY RX/DR IN RCRD: CPT | Performed by: FAMILY MEDICINE

## 2025-05-15 PROCEDURE — G0439 PPPS, SUBSEQ VISIT: HCPCS | Performed by: FAMILY MEDICINE

## 2025-05-15 PROCEDURE — 1159F MED LIST DOCD IN RCRD: CPT | Performed by: FAMILY MEDICINE

## 2025-05-15 RX ORDER — DONEPEZIL HYDROCHLORIDE 5 MG/1
5 TABLET, FILM COATED ORAL NIGHTLY
Qty: 30 TABLET | Refills: 5 | Status: SHIPPED | OUTPATIENT
Start: 2025-05-15 | End: 2025-05-15

## 2025-05-15 RX ORDER — UBIDECARENONE 100 MG
CAPSULE ORAL DAILY
COMMUNITY

## 2025-05-15 RX ORDER — DONEPEZIL HYDROCHLORIDE 5 MG/1
5 TABLET, FILM COATED ORAL NIGHTLY
Qty: 90 TABLET | Refills: 1 | Status: SHIPPED | OUTPATIENT
Start: 2025-05-15

## 2025-05-15 SDOH — ECONOMIC STABILITY: FOOD INSECURITY: WITHIN THE PAST 12 MONTHS, THE FOOD YOU BOUGHT JUST DIDN'T LAST AND YOU DIDN'T HAVE MONEY TO GET MORE.: NEVER TRUE

## 2025-05-15 SDOH — ECONOMIC STABILITY: FOOD INSECURITY: WITHIN THE PAST 12 MONTHS, YOU WORRIED THAT YOUR FOOD WOULD RUN OUT BEFORE YOU GOT MONEY TO BUY MORE.: NEVER TRUE

## 2025-05-15 ASSESSMENT — PATIENT HEALTH QUESTIONNAIRE - PHQ9
9. THOUGHTS THAT YOU WOULD BE BETTER OFF DEAD, OR OF HURTING YOURSELF: NOT AT ALL
2. FEELING DOWN, DEPRESSED OR HOPELESS: NOT AT ALL
SUM OF ALL RESPONSES TO PHQ QUESTIONS 1-9: 0
3. TROUBLE FALLING OR STAYING ASLEEP: NOT AT ALL
4. FEELING TIRED OR HAVING LITTLE ENERGY: NOT AT ALL
6. FEELING BAD ABOUT YOURSELF - OR THAT YOU ARE A FAILURE OR HAVE LET YOURSELF OR YOUR FAMILY DOWN: NOT AT ALL
1. LITTLE INTEREST OR PLEASURE IN DOING THINGS: NOT AT ALL
8. MOVING OR SPEAKING SO SLOWLY THAT OTHER PEOPLE COULD HAVE NOTICED. OR THE OPPOSITE, BEING SO FIGETY OR RESTLESS THAT YOU HAVE BEEN MOVING AROUND A LOT MORE THAN USUAL: NOT AT ALL
SUM OF ALL RESPONSES TO PHQ QUESTIONS 1-9: 0
10. IF YOU CHECKED OFF ANY PROBLEMS, HOW DIFFICULT HAVE THESE PROBLEMS MADE IT FOR YOU TO DO YOUR WORK, TAKE CARE OF THINGS AT HOME, OR GET ALONG WITH OTHER PEOPLE: NOT DIFFICULT AT ALL
SUM OF ALL RESPONSES TO PHQ QUESTIONS 1-9: 0
5. POOR APPETITE OR OVEREATING: NOT AT ALL
7. TROUBLE CONCENTRATING ON THINGS, SUCH AS READING THE NEWSPAPER OR WATCHING TELEVISION: NOT AT ALL
SUM OF ALL RESPONSES TO PHQ QUESTIONS 1-9: 0

## 2025-05-15 ASSESSMENT — LIFESTYLE VARIABLES
HOW MANY STANDARD DRINKS CONTAINING ALCOHOL DO YOU HAVE ON A TYPICAL DAY: 1 OR 2
HOW OFTEN DO YOU HAVE A DRINK CONTAINING ALCOHOL: MONTHLY OR LESS

## 2025-05-15 NOTE — PATIENT INSTRUCTIONS
lean proteins. Heart-healthy proteins include seafood, lean meats and poultry, eggs, beans, peas, nuts, seeds, and soy products.     Limit drinks and foods with added sugar. These include candy, desserts, and soda pop.   Heart-healthy lifestyle    If your doctor recommends it, get more exercise. For many people, walking is a good choice. Or you may want to swim, bike, or do other activities. Bit by bit, increase the time you're active every day. Try for at least 30 minutes on most days of the week.     Try to quit or cut back on using tobacco and other nicotine products. This includes smoking and vaping. If you need help quitting, talk to your doctor about stop-smoking programs and medicines. These can increase your chances of quitting for good. Quitting is one of the most important things you can do to protect your heart. It is never too late to quit. Try to avoid secondhand smoke too.     Stay at a weight that's healthy for you. Talk to your doctor if you need help losing weight.     Try to get 7 to 9 hours of sleep each night.     Limit alcohol to 2 drinks a day for men and 1 drink a day for women. Too much alcohol can cause health problems.     Manage other health problems such as diabetes, high blood pressure, and high cholesterol. If you think you may have a problem with alcohol or drug use, talk to your doctor.   Medicines    Take your medicines exactly as prescribed. Call your doctor if you think you are having a problem with your medicine.     If your doctor recommends aspirin, take the amount directed each day. Make sure you take aspirin and not another kind of pain reliever, such as acetaminophen (Tylenol).   When should you call for help?   Call 911 if you have symptoms of a heart attack. These may include:    Chest pain or pressure, or a strange feeling in the chest.     Sweating.     Shortness of breath.     Pain, pressure, or a strange feeling in the back, neck, jaw, or upper belly or in one or both

## 2025-05-15 NOTE — PROGRESS NOTES
Medicare Annual Wellness Visit    Rosie Poole is here for Medicare AWV (Check up /)    Assessment & Plan    Medicare annual wellness visit, subsequent  Health maintenance  - Cholesterol check due (last check was a year ago)  - Blood work to be ordered to assess cholesterol levels, kidney function, and liver function  - Gained weight due to increased consumption of baked goods and high-calorie foods  - Advised to moderate intake and consider portion control strategies  Mixed hyperlipidemia  -     CBC with Auto Differential; Future  -     Comprehensive Metabolic Panel; Future  -     Lipid Panel; Future  -     TSH reflex to FT4; Future  Elevated glucose  -     CBC with Auto Differential; Future  -     Comprehensive Metabolic Panel; Future  -     Lipid Panel; Future  -     Hemoglobin A1C; Future  Hyperparathyroidism  -     CBC with Auto Differential; Future  -     Comprehensive Metabolic Panel; Future  -     TSH reflex to FT4; Future  -     PTH, Intact; Future  -     Magnesium; Future  -     Vitamin D 25 Hydroxy; Future  -     Phosphorus; Future  Cognitive impairment  -     Vitamin B12 & Folate; Future  - Mild memory loss confirmed through cognitive testing  - Prescription for Aricept (donepezil) to be taken once daily at night  - Discussed potential side effects including gastrointestinal upset and diarrhea; advised to notify clinic if adverse effects occur         Return in about 6 months (around 11/15/2025) for memory.     Subjective   The following acute and/or chronic problems were also addressed today:  History of Present Illness  The patient presents for AWV and evaluation of memory loss and mucus production.    She reports that her back pain is contingent on the frequency of bending over and performing tasks. She experiences difficulty in performing simple tasks such as putting on her socks, which necessitates her to lie down. Despite these challenges, she manages to maintain her daily activities.    She has

## 2025-05-19 ENCOUNTER — CLINICAL SUPPORT (OUTPATIENT)
Dept: FAMILY MEDICINE CLINIC | Age: 89
End: 2025-05-19
Payer: MEDICARE

## 2025-05-19 DIAGNOSIS — R41.82 ALTERED MENTAL STATUS, UNSPECIFIED ALTERED MENTAL STATUS TYPE: Primary | ICD-10-CM

## 2025-05-19 DIAGNOSIS — R82.998 LEUKOCYTES IN URINE: ICD-10-CM

## 2025-05-19 LAB
BILIRUBIN, POC: ABNORMAL
BLOOD URINE, POC: ABNORMAL
CLARITY, POC: ABNORMAL
COLOR, POC: YELLOW
GLUCOSE URINE, POC: ABNORMAL MG/DL
KETONES, POC: ABNORMAL MG/DL
LEUKOCYTE EST, POC: ABNORMAL
NITRITE, POC: ABNORMAL
PH, POC: 5.5
PROTEIN, POC: ABNORMAL MG/DL
SPECIFIC GRAVITY, POC: 1.02
UROBILINOGEN, POC: 0.2 MG/DL

## 2025-05-19 PROCEDURE — 81002 URINALYSIS NONAUTO W/O SCOPE: CPT | Performed by: FAMILY MEDICINE

## 2025-05-21 LAB
BACTERIA UR CULT: ABNORMAL
ORGANISM: ABNORMAL

## 2025-05-22 ENCOUNTER — RESULTS FOLLOW-UP (OUTPATIENT)
Dept: FAMILY MEDICINE CLINIC | Age: 89
End: 2025-05-22

## 2025-05-22 DIAGNOSIS — N30.90 BLADDER INFECTION: Primary | ICD-10-CM

## 2025-05-22 RX ORDER — NITROFURANTOIN 25; 75 MG/1; MG/1
100 CAPSULE ORAL 2 TIMES DAILY
Qty: 10 CAPSULE | Refills: 0 | Status: SHIPPED | OUTPATIENT
Start: 2025-05-22 | End: 2025-05-23 | Stop reason: SDUPTHER

## 2025-05-23 DIAGNOSIS — N30.90 BLADDER INFECTION: ICD-10-CM

## 2025-05-23 RX ORDER — NITROFURANTOIN 25; 75 MG/1; MG/1
100 CAPSULE ORAL 2 TIMES DAILY
Qty: 10 CAPSULE | Refills: 0 | Status: SHIPPED | OUTPATIENT
Start: 2025-05-23 | End: 2025-05-28

## 2025-05-23 NOTE — TELEPHONE ENCOUNTER
Patient called back and she is needing the antibiotic sent to the [facility] on [facility]? She did not want it to go to [facility] 8 mile.

## 2025-05-27 ENCOUNTER — TELEPHONE (OUTPATIENT)
Dept: FAMILY MEDICINE CLINIC | Age: 89
End: 2025-05-27

## 2025-05-27 NOTE — TELEPHONE ENCOUNTER
Patient's daughter, Shiloh, called the office stating that Rosie is experiencing nausea and vomiting since she started the Macrobid last week.  Would like to know if another antibiotic could be sent in or if she should just stop the Marcobid?   Thanks.     Pharmacy [facility]

## 2025-05-28 DIAGNOSIS — N30.90 BLADDER INFECTION: Primary | ICD-10-CM

## 2025-05-28 RX ORDER — CEFPODOXIME PROXETIL 100 MG/1
100 TABLET, FILM COATED ORAL 2 TIMES DAILY
Qty: 6 TABLET | Refills: 0 | Status: SHIPPED | OUTPATIENT
Start: 2025-05-28 | End: 2025-05-31

## 2025-06-01 DIAGNOSIS — E78.5 HYPERLIPIDEMIA, UNSPECIFIED HYPERLIPIDEMIA TYPE: ICD-10-CM

## 2025-06-01 DIAGNOSIS — I10 ESSENTIAL HYPERTENSION: ICD-10-CM

## 2025-06-01 DIAGNOSIS — I10 PRIMARY HYPERTENSION: ICD-10-CM

## 2025-06-02 RX ORDER — SIMVASTATIN 40 MG
40 TABLET ORAL NIGHTLY
Qty: 90 TABLET | Refills: 1 | Status: SHIPPED | OUTPATIENT
Start: 2025-06-02

## 2025-06-02 RX ORDER — LISINOPRIL 40 MG/1
40 TABLET ORAL DAILY
Qty: 90 TABLET | Refills: 0 | Status: SHIPPED | OUTPATIENT
Start: 2025-06-02

## 2025-06-02 RX ORDER — ALBUTEROL SULFATE 90 UG/1
INHALANT RESPIRATORY (INHALATION)
Qty: 1 EACH | Refills: 3 | Status: SHIPPED | OUTPATIENT
Start: 2025-06-02

## 2025-06-02 RX ORDER — METOPROLOL TARTRATE 25 MG/1
25 TABLET, FILM COATED ORAL 2 TIMES DAILY
Qty: 180 TABLET | Refills: 0 | Status: SHIPPED | OUTPATIENT
Start: 2025-06-02

## 2025-06-02 NOTE — TELEPHONE ENCOUNTER
Refill Request     CONFIRM preferred pharmacy with the patient.    If Mail Order Rx - Pend for 90 day refill.      Last Seen: Last Seen Department: 5/19/2025  Last Seen by PCP: 5/15/2025    Last Written: albuterol 1/10/25 51 g with 1 refill     Lisinopril 1/10/25 90 with 1 refill     Metoprolol 1/10/25 180 with 1 refill     Simvastatin 1/10/25 90 with 1 refill       If no future appointment scheduled:  Review the last OV with PCP and review information for follow-up visit,  Route STAFF MESSAGE with patient name to the  Pool for scheduling with the following information:            -  Timing of next visit           -  Visit type ie Physical, OV, etc           -  Diagnoses/Reason ie. COPD, HTN - Do not use MEDICATION, Follow-up or CHECK UP - Give reason for visit      Next Appointment:   Future Appointments   Date Time Provider Department Center   6/25/2025  8:40 AM Garrett Grullon MD [facility]       Message sent to  to schedule appt with patient?  YES was to Return in about 6 months (around 11/15/2025) for memory.       Requested Prescriptions     Pending Prescriptions Disp Refills    albuterol sulfate HFA (PROVENTIL;VENTOLIN;PROAIR) 108 (90 Base) MCG/ACT inhaler [Pharmacy Med Name: ALBUTEROL HFA 90MCG/ACT (PA)] 51 g 3     Sig: INHALE 1 TO 2 INHALATIONS BY  MOUTH INTO THE LUNGS EVERY 4  HOURS AS NEEDED FOR WHEEZING    simvastatin (ZOCOR) 40 MG tablet [Pharmacy Med Name: Simvastatin 40 MG Oral Tablet] 90 tablet 3     Sig: TAKE 1 TABLET BY MOUTH EVERY  NIGHT    lisinopril (PRINIVIL;ZESTRIL) 40 MG tablet [Pharmacy Med Name: Lisinopril 40 MG Oral Tablet] 90 tablet 3     Sig: TAKE 1 TABLET BY MOUTH DAILY    metoprolol tartrate (LOPRESSOR) 25 MG tablet [Pharmacy Med Name: Metoprolol Tartrate 25 MG Oral Tablet] 180 tablet 3     Sig: TAKE 1 TABLET BY MOUTH TWICE  DAILY

## 2025-07-21 NOTE — TELEPHONE ENCOUNTER
Patient is stating the inhaler Dr prescribed insurance does not cover it so she was prescribed a different inhaler. She is having issue with new RX. Please advise.      # confirmed TRANSFER - IN REPORT:    Verbal report received from SPENCER Will on Jen Borges  being received from ED[facility] for ordered procedure      Information from the following report(s) ED Encounter Summary, Adult Overview, Pre Procedure Checklist, Procedure Verification, and Quality Measures was reviewed with the receiving nurse.    Opportunity for questions and clarification was provided.

## 2025-07-30 ENCOUNTER — OFFICE VISIT (OUTPATIENT)
Dept: FAMILY MEDICINE CLINIC | Age: 89
End: 2025-07-30
Payer: MEDICARE

## 2025-07-30 VITALS
TEMPERATURE: 98.2 F | DIASTOLIC BLOOD PRESSURE: 74 MMHG | OXYGEN SATURATION: 97 % | WEIGHT: 184.4 LBS | HEIGHT: 65 IN | BODY MASS INDEX: 30.72 KG/M2 | SYSTOLIC BLOOD PRESSURE: 114 MMHG | HEART RATE: 87 BPM

## 2025-07-30 DIAGNOSIS — N30.01 ACUTE CYSTITIS WITH HEMATURIA: ICD-10-CM

## 2025-07-30 DIAGNOSIS — R32 URINARY INCONTINENCE, UNSPECIFIED TYPE: Primary | ICD-10-CM

## 2025-07-30 LAB
BILIRUBIN, POC: NORMAL
BLOOD URINE, POC: NORMAL
CLARITY, POC: NORMAL
COLOR, POC: YELLOW
GLUCOSE URINE, POC: NORMAL MG/DL
KETONES, POC: NORMAL MG/DL
LEUKOCYTE EST, POC: NORMAL
NITRITE, POC: POSITIVE
PH, POC: 6
PROTEIN, POC: NORMAL MG/DL
SPECIFIC GRAVITY, POC: 1.02
UROBILINOGEN, POC: 0.2 MG/DL

## 2025-07-30 PROCEDURE — 0509F URINE INCON PLAN DOCD: CPT | Performed by: NURSE PRACTITIONER

## 2025-07-30 PROCEDURE — G8417 CALC BMI ABV UP PARAM F/U: HCPCS | Performed by: NURSE PRACTITIONER

## 2025-07-30 PROCEDURE — 81002 URINALYSIS NONAUTO W/O SCOPE: CPT | Performed by: NURSE PRACTITIONER

## 2025-07-30 PROCEDURE — 1036F TOBACCO NON-USER: CPT | Performed by: NURSE PRACTITIONER

## 2025-07-30 PROCEDURE — 99213 OFFICE O/P EST LOW 20 MIN: CPT | Performed by: NURSE PRACTITIONER

## 2025-07-30 PROCEDURE — G8427 DOCREV CUR MEDS BY ELIG CLIN: HCPCS | Performed by: NURSE PRACTITIONER

## 2025-07-30 PROCEDURE — 1123F ACP DISCUSS/DSCN MKR DOCD: CPT | Performed by: NURSE PRACTITIONER

## 2025-07-30 PROCEDURE — 1090F PRES/ABSN URINE INCON ASSESS: CPT | Performed by: NURSE PRACTITIONER

## 2025-07-30 PROCEDURE — 1159F MED LIST DOCD IN RCRD: CPT | Performed by: NURSE PRACTITIONER

## 2025-07-30 RX ORDER — CEFUROXIME AXETIL 500 MG/1
500 TABLET ORAL 2 TIMES DAILY
Qty: 14 TABLET | Refills: 0 | Status: CANCELLED | OUTPATIENT
Start: 2025-07-30 | End: 2025-08-06

## 2025-07-30 RX ORDER — CEFDINIR 300 MG/1
300 CAPSULE ORAL 2 TIMES DAILY
Qty: 14 CAPSULE | Refills: 0 | Status: SHIPPED | OUTPATIENT
Start: 2025-07-30 | End: 2025-08-06

## 2025-07-30 ASSESSMENT — ENCOUNTER SYMPTOMS
CHEST TIGHTNESS: 0
RHINORRHEA: 0
GASTROINTESTINAL NEGATIVE: 1
VOMITING: 0
ABDOMINAL DISTENTION: 0
COLOR CHANGE: 0
RESPIRATORY NEGATIVE: 1
COUGH: 0
WHEEZING: 0
ABDOMINAL PAIN: 0
BLOOD IN STOOL: 0
NAUSEA: 0
SHORTNESS OF BREATH: 0
TROUBLE SWALLOWING: 0
CONSTIPATION: 0
SORE THROAT: 0
DIARRHEA: 0

## 2025-08-01 LAB
BACTERIA UR CULT: ABNORMAL
ORGANISM: ABNORMAL

## 2025-08-04 RX ORDER — ALBUTEROL SULFATE 90 UG/1
INHALANT RESPIRATORY (INHALATION)
Qty: 1 EACH | Refills: 3 | Status: SHIPPED | OUTPATIENT
Start: 2025-08-04

## 2025-08-08 ENCOUNTER — RESULTS FOLLOW-UP (OUTPATIENT)
Dept: FAMILY MEDICINE CLINIC | Age: 89
End: 2025-08-08

## 2025-08-19 DIAGNOSIS — R60.0 BILATERAL LOWER EXTREMITY EDEMA: ICD-10-CM

## 2025-08-20 RX ORDER — TORSEMIDE 5 MG/1
5 TABLET ORAL DAILY
Qty: 90 TABLET | Refills: 1 | Status: SHIPPED | OUTPATIENT
Start: 2025-08-20

## 2025-08-25 DIAGNOSIS — I10 ESSENTIAL HYPERTENSION: ICD-10-CM

## 2025-08-25 DIAGNOSIS — I10 PRIMARY HYPERTENSION: ICD-10-CM

## 2025-08-26 RX ORDER — METOPROLOL TARTRATE 25 MG/1
25 TABLET, FILM COATED ORAL 2 TIMES DAILY
Qty: 180 TABLET | Refills: 1 | Status: SHIPPED | OUTPATIENT
Start: 2025-08-26

## 2025-08-26 RX ORDER — LISINOPRIL 40 MG/1
40 TABLET ORAL DAILY
Qty: 90 TABLET | Refills: 1 | Status: SHIPPED | OUTPATIENT
Start: 2025-08-26

## (undated) DEVICE — CANNULA NSL 13FT TUBE AD ETCO2 DIV SAMP M

## (undated) DEVICE — SNARE VASC L240CM LOOP W10MM SHTH DIA2.4MM RND STIFF CLD

## (undated) DEVICE — PREP SOL PVP IODINE 4%  4 OZ/BTL

## (undated) DEVICE — BOWL MED L 32OZ PLAS W/ MOLD GRAD EZ OPN PEEL PCH

## (undated) DEVICE — ENDO CARRY-ON PROCEDURE KIT INCLUDES SUCTION TUBING, LUBRICANT, GAUZE, BIOHAZARD STICKER, TRANSPORT PAD AND INTERCEPT BEDSIDE KIT.: Brand: ENDO CARRY-ON PROCEDURE KIT

## (undated) DEVICE — Z DUP USE 2522782 SOLUTION IRRIG 1000ML STRL H2O PLAS CONTAINER UROMATIC

## (undated) DEVICE — GOWN SIRUS NONREIN LG W/TWL: Brand: MEDLINE INDUSTRIES, INC.

## (undated) DEVICE — CYSTO/BLADDER IRRIGATION SET, REGULATING CLAMP

## (undated) DEVICE — GAUZE,SPONGE,4"X4",8PLY,STRL,LF,10/TRAY: Brand: MEDLINE

## (undated) DEVICE — GLOVE ORANGE PI 7 1/2   MSG9075

## (undated) DEVICE — Device: Brand: DISPOSABLE ELECTROSURGICAL SNARE

## (undated) DEVICE — MEDI-VAC NON-CONDUCTIVE SUCTION TUBING: Brand: CARDINAL HEALTH

## (undated) DEVICE — BAG URIN STRL FOR URO CTCH SYS

## (undated) DEVICE — DBD-PACK,CYSTOSCOPY,PK VI,AURORA: Brand: MEDLINE

## (undated) DEVICE — TRAP POLYP ETRAP

## (undated) DEVICE — ELECTRODE PT RET AD L9FT HI MOIST COND ADH HYDRGEL CORDED